# Patient Record
Sex: FEMALE | Race: WHITE | Employment: OTHER | URBAN - METROPOLITAN AREA
[De-identification: names, ages, dates, MRNs, and addresses within clinical notes are randomized per-mention and may not be internally consistent; named-entity substitution may affect disease eponyms.]

---

## 2020-06-24 ENCOUNTER — APPOINTMENT (OUTPATIENT)
Dept: ULTRASOUND IMAGING | Age: 85
DRG: 287 | End: 2020-06-24
Payer: MEDICARE

## 2020-06-24 ENCOUNTER — APPOINTMENT (OUTPATIENT)
Dept: CT IMAGING | Age: 85
DRG: 287 | End: 2020-06-24
Payer: MEDICARE

## 2020-06-24 ENCOUNTER — APPOINTMENT (OUTPATIENT)
Dept: GENERAL RADIOLOGY | Age: 85
DRG: 287 | End: 2020-06-24
Payer: MEDICARE

## 2020-06-24 ENCOUNTER — HOSPITAL ENCOUNTER (INPATIENT)
Age: 85
LOS: 3 days | Discharge: HOME | DRG: 287 | End: 2020-06-27
Attending: EMERGENCY MEDICINE | Admitting: INTERNAL MEDICINE
Payer: MEDICARE

## 2020-06-24 PROBLEM — I48.91 A-FIB (HCC): Status: ACTIVE | Noted: 2020-06-24

## 2020-06-24 LAB
ALBUMIN SERPL-MCNC: 3.6 G/DL (ref 3.5–4.6)
ALP BLD-CCNC: 50 U/L (ref 40–130)
ALT SERPL-CCNC: 41 U/L (ref 0–33)
ANION GAP SERPL CALCULATED.3IONS-SCNC: 13 MEQ/L (ref 9–15)
AST SERPL-CCNC: 23 U/L (ref 0–35)
BASOPHILS ABSOLUTE: 0.1 K/UL (ref 0–0.2)
BASOPHILS RELATIVE PERCENT: 1.2 %
BILIRUB SERPL-MCNC: 0.3 MG/DL (ref 0.2–0.7)
BUN BLDV-MCNC: 21 MG/DL (ref 8–23)
CALCIUM SERPL-MCNC: 8.8 MG/DL (ref 8.5–9.9)
CHLORIDE BLD-SCNC: 105 MEQ/L (ref 95–107)
CO2: 21 MEQ/L (ref 20–31)
CREAT SERPL-MCNC: 1.02 MG/DL (ref 0.5–0.9)
D DIMER: 1.84 MG/L FEU (ref 0–0.5)
EOSINOPHILS ABSOLUTE: 0.2 K/UL (ref 0–0.7)
EOSINOPHILS RELATIVE PERCENT: 2 %
GFR AFRICAN AMERICAN: >60
GFR NON-AFRICAN AMERICAN: 51.5
GLOBULIN: 3.3 G/DL (ref 2.3–3.5)
GLUCOSE BLD-MCNC: 170 MG/DL (ref 70–99)
HCT VFR BLD CALC: 43.8 % (ref 37–47)
HEMOGLOBIN: 14.6 G/DL (ref 12–16)
INR BLD: 1.1
LACTIC ACID: 1.2 MMOL/L (ref 0.5–2.2)
LYMPHOCYTES ABSOLUTE: 2.5 K/UL (ref 1–4.8)
LYMPHOCYTES RELATIVE PERCENT: 24.2 %
MAGNESIUM: 2.1 MG/DL (ref 1.7–2.4)
MCH RBC QN AUTO: 32.3 PG (ref 27–31.3)
MCHC RBC AUTO-ENTMCNC: 33.3 % (ref 33–37)
MCV RBC AUTO: 96.8 FL (ref 82–100)
MONOCYTES ABSOLUTE: 1 K/UL (ref 0.2–0.8)
MONOCYTES RELATIVE PERCENT: 9.6 %
NEUTROPHILS ABSOLUTE: 6.4 K/UL (ref 1.4–6.5)
NEUTROPHILS RELATIVE PERCENT: 63 %
PDW BLD-RTO: 13.8 % (ref 11.5–14.5)
PLATELET # BLD: 296 K/UL (ref 130–400)
POTASSIUM SERPL-SCNC: 4.1 MEQ/L (ref 3.4–4.9)
PRO-BNP: 4654 PG/ML
PROCALCITONIN: 0.03 NG/ML (ref 0–0.15)
PROTHROMBIN TIME: 14 SEC (ref 12.3–14.9)
RBC # BLD: 4.52 M/UL (ref 4.2–5.4)
SARS-COV-2, NAAT: NOT DETECTED
SODIUM BLD-SCNC: 139 MEQ/L (ref 135–144)
TOTAL PROTEIN: 6.9 G/DL (ref 6.3–8)
TROPONIN: 0.03 NG/ML (ref 0–0.01)
TROPONIN: 0.03 NG/ML (ref 0–0.01)
TROPONIN: <0.01 NG/ML (ref 0–0.01)
TSH SERPL DL<=0.05 MIU/L-ACNC: 1.4 UIU/ML (ref 0.44–3.86)
WBC # BLD: 10.2 K/UL (ref 4.8–10.8)

## 2020-06-24 PROCEDURE — 84145 PROCALCITONIN (PCT): CPT

## 2020-06-24 PROCEDURE — 99285 EMERGENCY DEPT VISIT HI MDM: CPT

## 2020-06-24 PROCEDURE — 96376 TX/PRO/DX INJ SAME DRUG ADON: CPT

## 2020-06-24 PROCEDURE — U0002 COVID-19 LAB TEST NON-CDC: HCPCS

## 2020-06-24 PROCEDURE — 84484 ASSAY OF TROPONIN QUANT: CPT

## 2020-06-24 PROCEDURE — 80053 COMPREHEN METABOLIC PANEL: CPT

## 2020-06-24 PROCEDURE — 83880 ASSAY OF NATRIURETIC PEPTIDE: CPT

## 2020-06-24 PROCEDURE — 6370000000 HC RX 637 (ALT 250 FOR IP): Performed by: INTERNAL MEDICINE

## 2020-06-24 PROCEDURE — 96372 THER/PROPH/DIAG INJ SC/IM: CPT

## 2020-06-24 PROCEDURE — 71045 X-RAY EXAM CHEST 1 VIEW: CPT

## 2020-06-24 PROCEDURE — 85379 FIBRIN DEGRADATION QUANT: CPT

## 2020-06-24 PROCEDURE — 6360000002 HC RX W HCPCS: Performed by: INTERNAL MEDICINE

## 2020-06-24 PROCEDURE — 71275 CT ANGIOGRAPHY CHEST: CPT

## 2020-06-24 PROCEDURE — 2060000000 HC ICU INTERMEDIATE R&B

## 2020-06-24 PROCEDURE — 87040 BLOOD CULTURE FOR BACTERIA: CPT

## 2020-06-24 PROCEDURE — 84443 ASSAY THYROID STIM HORMONE: CPT

## 2020-06-24 PROCEDURE — 6360000002 HC RX W HCPCS: Performed by: EMERGENCY MEDICINE

## 2020-06-24 PROCEDURE — 2580000003 HC RX 258: Performed by: EMERGENCY MEDICINE

## 2020-06-24 PROCEDURE — 93005 ELECTROCARDIOGRAM TRACING: CPT | Performed by: EMERGENCY MEDICINE

## 2020-06-24 PROCEDURE — 6360000004 HC RX CONTRAST MEDICATION: Performed by: EMERGENCY MEDICINE

## 2020-06-24 PROCEDURE — 85610 PROTHROMBIN TIME: CPT

## 2020-06-24 PROCEDURE — 93970 EXTREMITY STUDY: CPT

## 2020-06-24 PROCEDURE — 96365 THER/PROPH/DIAG IV INF INIT: CPT

## 2020-06-24 PROCEDURE — 85025 COMPLETE CBC W/AUTO DIFF WBC: CPT

## 2020-06-24 PROCEDURE — 2580000003 HC RX 258: Performed by: NURSE PRACTITIONER

## 2020-06-24 PROCEDURE — 83605 ASSAY OF LACTIC ACID: CPT

## 2020-06-24 PROCEDURE — 6370000000 HC RX 637 (ALT 250 FOR IP): Performed by: NURSE PRACTITIONER

## 2020-06-24 PROCEDURE — 2500000003 HC RX 250 WO HCPCS: Performed by: EMERGENCY MEDICINE

## 2020-06-24 PROCEDURE — 36415 COLL VENOUS BLD VENIPUNCTURE: CPT

## 2020-06-24 PROCEDURE — 83735 ASSAY OF MAGNESIUM: CPT

## 2020-06-24 PROCEDURE — 99223 1ST HOSP IP/OBS HIGH 75: CPT | Performed by: INTERNAL MEDICINE

## 2020-06-24 RX ORDER — ACETAMINOPHEN 650 MG/1
650 SUPPOSITORY RECTAL EVERY 6 HOURS PRN
Status: DISCONTINUED | OUTPATIENT
Start: 2020-06-24 | End: 2020-06-27 | Stop reason: HOSPADM

## 2020-06-24 RX ORDER — ACETAMINOPHEN 325 MG/1
650 TABLET ORAL EVERY 6 HOURS PRN
Status: DISCONTINUED | OUTPATIENT
Start: 2020-06-24 | End: 2020-06-27 | Stop reason: HOSPADM

## 2020-06-24 RX ORDER — ASPIRIN 81 MG/1
81 TABLET, CHEWABLE ORAL DAILY
Status: DISCONTINUED | OUTPATIENT
Start: 2020-06-24 | End: 2020-06-27 | Stop reason: HOSPADM

## 2020-06-24 RX ORDER — GUAIFENESIN 600 MG/1
600 TABLET, EXTENDED RELEASE ORAL 2 TIMES DAILY
Status: DISCONTINUED | OUTPATIENT
Start: 2020-06-24 | End: 2020-06-27 | Stop reason: HOSPADM

## 2020-06-24 RX ORDER — FUROSEMIDE 10 MG/ML
20 INJECTION INTRAMUSCULAR; INTRAVENOUS 2 TIMES DAILY
Status: DISCONTINUED | OUTPATIENT
Start: 2020-06-24 | End: 2020-06-27

## 2020-06-24 RX ORDER — SODIUM CHLORIDE 0.9 % (FLUSH) 0.9 %
10 SYRINGE (ML) INJECTION EVERY 12 HOURS SCHEDULED
Status: DISCONTINUED | OUTPATIENT
Start: 2020-06-24 | End: 2020-06-27 | Stop reason: HOSPADM

## 2020-06-24 RX ORDER — POLYETHYLENE GLYCOL 3350 17 G/17G
17 POWDER, FOR SOLUTION ORAL DAILY PRN
Status: DISCONTINUED | OUTPATIENT
Start: 2020-06-24 | End: 2020-06-27 | Stop reason: HOSPADM

## 2020-06-24 RX ORDER — DILTIAZEM HYDROCHLORIDE 5 MG/ML
20 INJECTION INTRAVENOUS ONCE
Status: COMPLETED | OUTPATIENT
Start: 2020-06-24 | End: 2020-06-24

## 2020-06-24 RX ORDER — ONDANSETRON 2 MG/ML
4 INJECTION INTRAMUSCULAR; INTRAVENOUS EVERY 6 HOURS PRN
Status: DISCONTINUED | OUTPATIENT
Start: 2020-06-24 | End: 2020-06-27 | Stop reason: HOSPADM

## 2020-06-24 RX ORDER — PROMETHAZINE HYDROCHLORIDE 12.5 MG/1
12.5 TABLET ORAL EVERY 6 HOURS PRN
Status: DISCONTINUED | OUTPATIENT
Start: 2020-06-24 | End: 2020-06-27 | Stop reason: HOSPADM

## 2020-06-24 RX ORDER — IPRATROPIUM BROMIDE AND ALBUTEROL SULFATE 2.5; .5 MG/3ML; MG/3ML
1 SOLUTION RESPIRATORY (INHALATION) EVERY 4 HOURS PRN
Status: DISCONTINUED | OUTPATIENT
Start: 2020-06-24 | End: 2020-06-27 | Stop reason: HOSPADM

## 2020-06-24 RX ORDER — SODIUM CHLORIDE 0.9 % (FLUSH) 0.9 %
10 SYRINGE (ML) INJECTION PRN
Status: DISCONTINUED | OUTPATIENT
Start: 2020-06-24 | End: 2020-06-27 | Stop reason: HOSPADM

## 2020-06-24 RX ORDER — ATORVASTATIN CALCIUM 20 MG/1
20 TABLET, FILM COATED ORAL NIGHTLY
Status: DISCONTINUED | OUTPATIENT
Start: 2020-06-24 | End: 2020-06-27 | Stop reason: HOSPADM

## 2020-06-24 RX ADMIN — IOPAMIDOL 100 ML: 612 INJECTION, SOLUTION INTRAVENOUS at 11:07

## 2020-06-24 RX ADMIN — Medication 10 ML: at 22:29

## 2020-06-24 RX ADMIN — FUROSEMIDE 20 MG: 10 INJECTION, SOLUTION INTRAMUSCULAR; INTRAVENOUS at 19:01

## 2020-06-24 RX ADMIN — GUAIFENESIN 600 MG: 600 TABLET, EXTENDED RELEASE ORAL at 22:29

## 2020-06-24 RX ADMIN — ASPIRIN 81 MG: 81 TABLET, CHEWABLE ORAL at 19:01

## 2020-06-24 RX ADMIN — ENOXAPARIN SODIUM 80 MG: 80 INJECTION SUBCUTANEOUS at 11:55

## 2020-06-24 RX ADMIN — DILTIAZEM HYDROCHLORIDE 7.5 MG/HR: 5 INJECTION INTRAVENOUS at 11:24

## 2020-06-24 RX ADMIN — DILTIAZEM HYDROCHLORIDE 20 MG: 5 INJECTION INTRAVENOUS at 10:23

## 2020-06-24 RX ADMIN — AZITHROMYCIN MONOHYDRATE 500 MG: 500 INJECTION, POWDER, LYOPHILIZED, FOR SOLUTION INTRAVENOUS at 12:33

## 2020-06-24 RX ADMIN — ATORVASTATIN CALCIUM 20 MG: 20 TABLET, FILM COATED ORAL at 22:29

## 2020-06-24 RX ADMIN — CEFTRIAXONE SODIUM 1 G: 1 INJECTION, POWDER, FOR SOLUTION INTRAMUSCULAR; INTRAVENOUS at 12:18

## 2020-06-24 RX ADMIN — METOPROLOL TARTRATE 25 MG: 25 TABLET, FILM COATED ORAL at 17:14

## 2020-06-24 ASSESSMENT — ENCOUNTER SYMPTOMS
COUGH: 1
SHORTNESS OF BREATH: 1
VOMITING: 0
EYE PAIN: 0
SORE THROAT: 0
ABDOMINAL PAIN: 0
ALLERGIC/IMMUNOLOGIC NEGATIVE: 1
WHEEZING: 0
CHEST TIGHTNESS: 0
GASTROINTESTINAL NEGATIVE: 1
EYES NEGATIVE: 1
NAUSEA: 0

## 2020-06-24 NOTE — CARE COORDINATION
[facility] EMERGENCY [facility] [place] Case Management Initial Discharge Assessment    Met with Patient to discuss discharge plan. PCP: Dr. Maria Dolores Medina                          Date of Last Visit: fall 2019    If no PCP, list provided? N/A    Discharge Planning    Living Arrangements: independently at home    Who do you live with? spouse    Who helps you with your care:  self    If lives at home:     Do you have any barriers navigating in your home? no    Patient can perform ADL? Yes    Current Services (outpatient and in home) :  None    Dialysis: No    Is transportation available to get to your appointments? Yes    DME Equipment:  no    Respiratory equipment: None    Respiratory provider:  no     Pharmacy:  yes - [address]    Consult with Medication Assistance Program?  No      Patient agreeable to [facility]? N/A    Patient agreeable to SNF/Rehab? N/A    Other discharge needs identified? N/A    Freedom of choice list provided with basic dialogue that supports the patient's individualized plan of care/goals and shares the quality data associated with the providers. N/A    Does Patient Have a High-Risk for Readmission Diagnosis (CHF, PN, MI, COPD)? No      The plan for Transition of Care is related to the following treatment goals:decrease shortness of breath    Initial Discharge Plan? (Note: please see concurrent daily documentation for any updates after initial note).         The Patient and/or patient representative:  was provided with choice of any post-acute providers for care and equipment and agrees with discharge plan  N/A    Electronically signed by Antoinette Patel RN on 6/24/2020 at 1:05 PM

## 2020-06-24 NOTE — H&P
Hospitalist History and Physical  Name: Vanna Loja  Age: 80 y.o. Gender: female  CodeStatus: No Order  Allergies: No Known Allergies    Chief Complaint:Shortness of Breath (x 2 weeks, getting worse. Flew home May 28th from [place]. Got SOB after)    Primary Care Provider: Abdullahi Ahuja MD  InpatientTreatment Team: Treatment Team: Attending Provider: Kristina Blanchard DO; Patient Care Tech: Jean Ding; Registered Nurse: Kam Scott RN; Consulting Physician: William Thurston DO  Admission Date: 6/24/2020      Subjective: Patient is 80-year-old female with past medical history of hypertension who presented to the emergency room with reports of shortness of breath. Patient reports living in [address] [place] [address] since January. She denies being exposed to COVID during that time but also states that she has had some fatigue, intermittent cough and increased shortness of breath since February. Her shortness of breath has worsened along with right lower extremity tightness since flying back to [place] on May 28. She denies palpitations, fever, dizziness, nausea or other flulike symptoms. Emergency room work-up revealed a elevated d-dimer. CTA negative for PE but does show left lower lobe pneumonia. Patient is hemodynamically stable and afebrile. Heart rate was 158 and irregular on arrival, currently 75 and regular. Physical Exam  Constitutional:       Appearance: Normal appearance. HENT:      Head: Normocephalic and atraumatic. Right Ear: External ear normal.      Left Ear: External ear normal.      Nose: Nose normal.      Mouth/Throat:      Mouth: Mucous membranes are moist.      Pharynx: Oropharynx is clear. Eyes:      Extraocular Movements: Extraocular movements intact. Conjunctiva/sclera: Conjunctivae normal.      Pupils: Pupils are equal, round, and reactive to light. Neck:      Musculoskeletal: Normal range of motion and neck supple.    Cardiovascular:      Rate and Rhythm: Normal rate and regular rhythm. Pulses: Normal pulses. Heart sounds: Normal heart sounds. Pulmonary:      Effort: Pulmonary effort is normal.      Breath sounds: Normal breath sounds. Abdominal:      General: Bowel sounds are normal.      Palpations: Abdomen is soft. Musculoskeletal: Normal range of motion. Skin:     General: Skin is warm and dry. Capillary Refill: Capillary refill takes less than 2 seconds. Neurological:      General: No focal deficit present. Mental Status: She is alert and oriented to person, place, and time. Psychiatric:         Mood and Affect: Mood normal.         Review of Systems   Constitutional: Positive for fatigue. HENT: Negative. Eyes: Negative. Respiratory: Positive for cough and shortness of breath. Cardiovascular: Negative. Gastrointestinal: Negative. Endocrine: Negative. Genitourinary: Negative. Musculoskeletal: Negative. Skin: Negative. Allergic/Immunologic: Negative. Neurological: Negative. Hematological: Negative. Psychiatric/Behavioral: Negative. Medications:  Reviewed    No current facility-administered medications on file prior to encounter. Current Outpatient Medications on File Prior to Encounter   Medication Sig Dispense Refill    amLODIPine (NORVASC) 5 MG tablet TAKE 1 TABLET BY MOUTH ONCE DAILY 90 tablet 3    acetaminophen (TYLENOL) 325 MG tablet Take 650 mg by mouth every 6 hours as needed.  oxybutynin (DITROPAN) 5 MG tablet Take 1 tablet by mouth 3 times daily. 270 tablet 1    diphenhydrAMINE (BENADRYL) 25 MG tablet Take 25 mg by mouth nightly as needed. For sleep       aspirin 81 MG EC tablet Take 81 mg by mouth daily.  hydrochlorothiazide (HYDRODIURIL) 25 MG tablet Take 25 mg by mouth daily.  multivitamin (ANTIOXIDANT;PROSIGHT) TABS per tablet Take 1 tablet by mouth daily.            Past Medical History:   Diagnosis Date    Elevated glucose     HTN (hypertension)     Hyperlipidemia     Osteopenia     Hips    Rheumatic heart disease     as a child    Rib fractures     Secondary to AA       Past Surgical History:   Procedure Laterality Date    APPENDECTOMY       SECTION      COLONOSCOPY      Internal hemorrhoids    COLONOSCOPY  2011    normal    HYSTERECTOMY          Family History   Problem Relation Age of Onset    Heart Disease Mother         Valvular heart disease from childhood rheumatic fever    Diabetes Maternal Aunt         Social History     Socioeconomic History    Marital status:      Spouse name: Not on file    Number of children: Not on file    Years of education: Not on file    Highest education level: Not on file   Occupational History    Not on file   Social Needs    Financial resource strain: Not on file    Food insecurity     Worry: Not on file     Inability: Not on file    Transportation needs     Medical: Not on file     Non-medical: Not on file   Tobacco Use    Smoking status: Never Smoker    Smokeless tobacco: Never Used   Substance and Sexual Activity    Alcohol use: No    Drug use: Never    Sexual activity: Not on file   Lifestyle    Physical activity     Days per week: Not on file     Minutes per session: Not on file    Stress: Not on file   Relationships    Social connections     Talks on phone: Not on file     Gets together: Not on file     Attends Jehovah's witness service: Not on file     Active member of club or organization: Not on file     Attends meetings of clubs or organizations: Not on file     Relationship status: Not on file    Intimate partner violence     Fear of current or ex partner: Not on file     Emotionally abused: Not on file     Physically abused: Not on file     Forced sexual activity: Not on file   Other Topics Concern    Not on file   Social History Narrative    Not on file        Infusion Medications:    dilTIAZem (CARDIZEM) 125 mg in dextrose 5% 125 mL infusion 10 mg/hr (20 1155)

## 2020-06-24 NOTE — CONSULTS
mEq/L    Chloride 105 95 - 107 mEq/L    CO2 21 20 - 31 mEq/L    Anion Gap 13 9 - 15 mEq/L    Glucose 170 (H) 70 - 99 mg/dL    BUN 21 8 - 23 mg/dL    CREATININE 1.02 (H) 0.50 - 0.90 mg/dL    GFR Non-African American 51.5 (L) >60    GFR  >60.0 >60    Calcium 8.8 8.5 - 9.9 mg/dL    Total Protein 6.9 6.3 - 8.0 g/dL    Alb 3.6 3.5 - 4.6 g/dL    Total Bilirubin 0.3 0.2 - 0.7 mg/dL    Alkaline Phosphatase 50 40 - 130 U/L    ALT 41 (H) 0 - 33 U/L    AST 23 0 - 35 U/L    Globulin 3.3 2.3 - 3.5 g/dL   Troponin    Collection Time: 06/24/20 10:00 AM   Result Value Ref Range    Troponin <0.010 0.000 - 0.010 ng/mL   D-Dimer, Quantitative    Collection Time: 06/24/20 10:00 AM   Result Value Ref Range    D-Dimer, Quant 1.84 (HH) 0.00 - 0.50 mg/L FEU   Protime-INR    Collection Time: 06/24/20 10:00 AM   Result Value Ref Range    Protime 14.0 12.3 - 14.9 sec    INR 1.1    Magnesium    Collection Time: 06/24/20 10:00 AM   Result Value Ref Range    Magnesium 2.1 1.7 - 2.4 mg/dL   COVID-19    Collection Time: 06/24/20 10:17 AM   Result Value Ref Range    SARS-CoV-2, NAAT Not Detected Not Detected   Lactic Acid, Plasma    Collection Time: 06/24/20 12:15 PM   Result Value Ref Range    Lactic Acid 1.2 0.5 - 2.2 mmol/L   Troponin    Collection Time: 06/24/20  3:25 PM   Result Value Ref Range    Troponin 0.030 (HH) 0.000 - 0.010 ng/mL   PROCALCITONIN    Collection Time: 06/24/20  3:25 PM   Result Value Ref Range    Procalcitonin 0.03 0.00 - 0.15 ng/mL   TSH without Reflex    Collection Time: 06/24/20  3:25 PM   Result Value Ref Range    TSH 1.400 0.440 - 3.860 uIU/mL     Troponin:   Lab Results   Component Value Date    TROPONINI 0.030 06/24/2020           ASSESSMENT:    New onset paroxysmal atrial fibrillation, currently normal sinus rhythm    Shortness of breath secondary to pneumonia.   Rule out cardiomyopathy rule out cardiac ischemia    Elevated cardiac enzymes questionable secondary to demand ischemia versus underlying

## 2020-06-24 NOTE — ED PROVIDER NOTES
(hypertension)     Hyperlipidemia     Osteopenia     Hips    Rheumatic heart disease     as a child    Rib fractures     Secondary to AA         SURGICALHISTORY       Past Surgical History:   Procedure Laterality Date    APPENDECTOMY       SECTION      COLONOSCOPY      Internal hemorrhoids    COLONOSCOPY      normal    HYSTERECTOMY           CURRENT MEDICATIONS       Previous Medications    ACETAMINOPHEN (TYLENOL) 325 MG TABLET    Take 650 mg by mouth every 6 hours as needed. AMLODIPINE (NORVASC) 5 MG TABLET    TAKE 1 TABLET BY MOUTH ONCE DAILY    ASPIRIN 81 MG EC TABLET    Take 81 mg by mouth daily. DIPHENHYDRAMINE (BENADRYL) 25 MG TABLET    Take 25 mg by mouth nightly as needed. For sleep     HYDROCHLOROTHIAZIDE (HYDRODIURIL) 25 MG TABLET    Take 25 mg by mouth daily. MULTIVITAMIN (ANTIOXIDANT;PROSIGHT) TABS PER TABLET    Take 1 tablet by mouth daily. OXYBUTYNIN (DITROPAN) 5 MG TABLET    Take 1 tablet by mouth 3 times daily. ALLERGIES     Patient has no known allergies.     FAMILY HISTORY       Family History   Problem Relation Age of Onset    Heart Disease Mother         Valvular heart disease from childhood rheumatic fever    Diabetes Maternal Aunt           SOCIAL HISTORY       Social History     Socioeconomic History    Marital status:      Spouse name: None    Number of children: None    Years of education: None    Highest education level: None   Occupational History    None   Social Needs    Financial resource strain: None    Food insecurity     Worry: None     Inability: None    Transportation needs     Medical: None     Non-medical: None   Tobacco Use    Smoking status: Never Smoker    Smokeless tobacco: Never Used   Substance and Sexual Activity    Alcohol use: No    Drug use: Never    Sexual activity: None   Lifestyle    Physical activity     Days per week: None     Minutes per session: None    Stress: None   Relationships    Social connections     Talks on phone: None     Gets together: None     Attends Yazidi service: None     Active member of club or organization: None     Attends meetings of clubs or organizations: None     Relationship status: None    Intimate partner violence     Fear of current or ex partner: None     Emotionally abused: None     Physically abused: None     Forced sexual activity: None   Other Topics Concern    None   Social History Narrative    None       SCREENINGS    [place] Coma Scale  Eye Opening: Spontaneous  Best Verbal Response: Oriented         PHYSICAL EXAM    (up to 7 for level 4, 8 or more for level 5)     ED Triage Vitals [06/24/20 0945]   BP Temp Temp Source Pulse Resp SpO2 Height Weight   (!) 159/102 97.8 °F (36.6 °C) Oral 158 26 94 % 5' 1\" (1.549 m) 180 lb (81.6 kg)       Physical Exam  Constitutional:       General: She is not in acute distress. Appearance: She is well-developed. She is not diaphoretic. HENT:      Head: Normocephalic and atraumatic. Right Ear: External ear normal.      Left Ear: External ear normal.      Mouth/Throat:      Pharynx: No oropharyngeal exudate. Eyes:      Conjunctiva/sclera: Conjunctivae normal.      Pupils: Pupils are equal, round, and reactive to light. Neck:      Musculoskeletal: Normal range of motion and neck supple. Thyroid: No thyromegaly. Vascular: No JVD. Trachea: No tracheal deviation. Cardiovascular:      Rate and Rhythm: Tachycardia present. Rhythm irregular. Heart sounds: Normal heart sounds. No murmur. Pulmonary:      Effort: Pulmonary effort is normal. No respiratory distress. Breath sounds: Normal breath sounds. No wheezing, rhonchi or rales. Abdominal:      General: Bowel sounds are normal.      Palpations: Abdomen is soft. Tenderness: There is no abdominal tenderness. There is no guarding. Musculoskeletal: Normal range of motion. Skin:     General: Skin is warm and dry.       Findings: No

## 2020-06-24 NOTE — ED TRIAGE NOTES
Patient with no history of any cardiac issues patient came back from [place] a few weeks ago and has felt short of breath

## 2020-06-24 NOTE — ACP (ADVANCE CARE PLANNING)
Advance Care Planning     Advance Care Planning Activator (Inpatient)  Conversation Note      Date of ACP Conversation: 6/24/2020    Conversation Conducted with: Patient with Decision Making Capacity    ACP Activator: Linus Chairez makes decisions on behalf of the incapacitated patient: Decision Maker is asked to consider and make decisions based on patient values, known preferences, or best interests. Health Care Decision Maker:     Current Designated Health Care Decision Maker:   (If there is a valid [place] named in the [facility] Makers\" box in the ACP activity, but it is not visible above, be sure to open that field and then select the health care decision maker relationship (ie \"primary\") in the blank space to the right of the name.) Validate  this information as still accurate & up-to-date; edit [place] field as needed.)    Note: Assess and validate information in current ACP documents, as indicated. If no Decision Maker listed above or available through scanned documents, then:    If no Authorized Decision Maker has previously been identified, then patient chooses [place]:  \"Who would you like to name as your primary health care decision-maker? \"               Name: Dawit Kline        Relationship: dtr          Phone number: [phone]  \"Can this person be reached easily? \" Yes  \"Who would you like to name as your back-up decision maker? \"   Name: Rola Bautista         Relationship: spouse          Phone number: [phone]  Lucinda Mondragon this person be reached easily? \" Yes    Note: If the relationship of these Decision-Makers to the patient does NOT follow your state's Next of Kin hierarchy, recommend that patient complete ACP document that meets state-specific requirements to allow them to act on the patient's behalf when appropriate. Care Preferences    Ventilation:   \"If you were in your present state of health and POLST/POST/MOLST/MOST prepared for Provider review and signature      Follow-up plan:    [x] Schedule follow-up conversation to continue planning  [] Referred individual to Provider for additional questions/concerns   [] Advised patient/agent/surrogate to review completed ACP document and update if needed with changes in condition, patient preferences or care setting    [] This note routed to one or more involved healthcare providers

## 2020-06-25 LAB
ANION GAP SERPL CALCULATED.3IONS-SCNC: 12 MEQ/L (ref 9–15)
BUN BLDV-MCNC: 21 MG/DL (ref 8–23)
CALCIUM SERPL-MCNC: 9.3 MG/DL (ref 8.5–9.9)
CHLORIDE BLD-SCNC: 106 MEQ/L (ref 95–107)
CO2: 23 MEQ/L (ref 20–31)
CREAT SERPL-MCNC: 1.12 MG/DL (ref 0.5–0.9)
EKG ATRIAL RATE: 113 BPM
EKG ATRIAL RATE: 141 BPM
EKG ATRIAL RATE: 78 BPM
EKG Q-T INTERVAL: 312 MS
EKG Q-T INTERVAL: 372 MS
EKG Q-T INTERVAL: 378 MS
EKG QRS DURATION: 122 MS
EKG QRS DURATION: 122 MS
EKG QRS DURATION: 126 MS
EKG QTC CALCULATION (BAZETT): 427 MS
EKG QTC CALCULATION (BAZETT): 477 MS
EKG QTC CALCULATION (BAZETT): 499 MS
EKG R AXIS: -34 DEGREES
EKG R AXIS: -38 DEGREES
EKG R AXIS: -50 DEGREES
EKG T AXIS: 125 DEGREES
EKG T AXIS: 129 DEGREES
EKG T AXIS: 134 DEGREES
EKG VENTRICULAR RATE: 154 BPM
EKG VENTRICULAR RATE: 77 BPM
EKG VENTRICULAR RATE: 99 BPM
GFR AFRICAN AMERICAN: 56
GFR NON-AFRICAN AMERICAN: 46.3
GLUCOSE BLD-MCNC: 138 MG/DL (ref 70–99)
INR BLD: 1.2
LV EF: 38 %
LVEF MODALITY: NORMAL
POTASSIUM SERPL-SCNC: 4.1 MEQ/L (ref 3.4–4.9)
PROTHROMBIN TIME: 15.3 SEC (ref 12.3–14.9)
SARS-COV-2, NAAT: NOT DETECTED
SODIUM BLD-SCNC: 141 MEQ/L (ref 135–144)

## 2020-06-25 PROCEDURE — 93005 ELECTROCARDIOGRAM TRACING: CPT | Performed by: NURSE PRACTITIONER

## 2020-06-25 PROCEDURE — 2580000003 HC RX 258: Performed by: NURSE PRACTITIONER

## 2020-06-25 PROCEDURE — 6370000000 HC RX 637 (ALT 250 FOR IP): Performed by: INTERNAL MEDICINE

## 2020-06-25 PROCEDURE — 85610 PROTHROMBIN TIME: CPT

## 2020-06-25 PROCEDURE — 2700000000 HC OXYGEN THERAPY PER DAY

## 2020-06-25 PROCEDURE — 2500000003 HC RX 250 WO HCPCS: Performed by: INTERNAL MEDICINE

## 2020-06-25 PROCEDURE — 6360000002 HC RX W HCPCS: Performed by: INTERNAL MEDICINE

## 2020-06-25 PROCEDURE — 99233 SBSQ HOSP IP/OBS HIGH 50: CPT | Performed by: INTERNAL MEDICINE

## 2020-06-25 PROCEDURE — U0002 COVID-19 LAB TEST NON-CDC: HCPCS

## 2020-06-25 PROCEDURE — 36415 COLL VENOUS BLD VENIPUNCTURE: CPT

## 2020-06-25 PROCEDURE — 6370000000 HC RX 637 (ALT 250 FOR IP): Performed by: NURSE PRACTITIONER

## 2020-06-25 PROCEDURE — 6360000002 HC RX W HCPCS: Performed by: NURSE PRACTITIONER

## 2020-06-25 PROCEDURE — 80048 BASIC METABOLIC PNL TOTAL CA: CPT

## 2020-06-25 PROCEDURE — 93306 TTE W/DOPPLER COMPLETE: CPT

## 2020-06-25 PROCEDURE — 2060000000 HC ICU INTERMEDIATE R&B

## 2020-06-25 PROCEDURE — 6370000000 HC RX 637 (ALT 250 FOR IP): Performed by: PHYSICIAN ASSISTANT

## 2020-06-25 PROCEDURE — APPNB30 APP NON BILLABLE TIME 0-30 MINS: Performed by: PHYSICIAN ASSISTANT

## 2020-06-25 PROCEDURE — 99223 1ST HOSP IP/OBS HIGH 75: CPT | Performed by: INTERNAL MEDICINE

## 2020-06-25 RX ORDER — METOPROLOL TARTRATE 5 MG/5ML
2.5 INJECTION INTRAVENOUS EVERY 6 HOURS PRN
Status: DISCONTINUED | OUTPATIENT
Start: 2020-06-25 | End: 2020-06-26

## 2020-06-25 RX ORDER — METOPROLOL TARTRATE 5 MG/5ML
5 INJECTION INTRAVENOUS ONCE
Status: COMPLETED | OUTPATIENT
Start: 2020-06-25 | End: 2020-06-25

## 2020-06-25 RX ADMIN — ASPIRIN 81 MG: 81 TABLET, CHEWABLE ORAL at 08:33

## 2020-06-25 RX ADMIN — CEFTRIAXONE SODIUM 1 G: 1 INJECTION, POWDER, FOR SOLUTION INTRAMUSCULAR; INTRAVENOUS at 11:59

## 2020-06-25 RX ADMIN — Medication 10 ML: at 08:34

## 2020-06-25 RX ADMIN — GUAIFENESIN 600 MG: 600 TABLET, EXTENDED RELEASE ORAL at 08:33

## 2020-06-25 RX ADMIN — ATORVASTATIN CALCIUM 20 MG: 20 TABLET, FILM COATED ORAL at 21:27

## 2020-06-25 RX ADMIN — Medication 10 ML: at 21:27

## 2020-06-25 RX ADMIN — ENOXAPARIN SODIUM 80 MG: 80 INJECTION SUBCUTANEOUS at 21:31

## 2020-06-25 RX ADMIN — FUROSEMIDE 20 MG: 10 INJECTION, SOLUTION INTRAMUSCULAR; INTRAVENOUS at 08:33

## 2020-06-25 RX ADMIN — GUAIFENESIN 600 MG: 600 TABLET, EXTENDED RELEASE ORAL at 21:26

## 2020-06-25 RX ADMIN — METOPROLOL TARTRATE 25 MG: 25 TABLET, FILM COATED ORAL at 08:34

## 2020-06-25 RX ADMIN — FUROSEMIDE 20 MG: 10 INJECTION, SOLUTION INTRAMUSCULAR; INTRAVENOUS at 17:59

## 2020-06-25 RX ADMIN — METOPROLOL TARTRATE 25 MG: 25 TABLET, FILM COATED ORAL at 21:27

## 2020-06-25 RX ADMIN — AZITHROMYCIN 500 MG: 500 INJECTION, POWDER, LYOPHILIZED, FOR SOLUTION INTRAVENOUS at 12:54

## 2020-06-25 RX ADMIN — ENOXAPARIN SODIUM 80 MG: 80 INJECTION SUBCUTANEOUS at 00:50

## 2020-06-25 RX ADMIN — METOPROLOL TARTRATE 5 MG: 5 INJECTION INTRAVENOUS at 00:51

## 2020-06-25 RX ADMIN — ENOXAPARIN SODIUM 80 MG: 80 INJECTION SUBCUTANEOUS at 08:34

## 2020-06-25 RX ADMIN — Medication 5 MG: at 21:26

## 2020-06-25 ASSESSMENT — PAIN SCALES - GENERAL
PAINLEVEL_OUTOF10: 0

## 2020-06-25 ASSESSMENT — ENCOUNTER SYMPTOMS
VOMITING: 0
NAUSEA: 0
COLOR CHANGE: 0
SHORTNESS OF BREATH: 1
CHEST TIGHTNESS: 0

## 2020-06-25 NOTE — FLOWSHEET NOTE
2229 Pt assessment and medications completed. Pt educated on need for negative airflow in the room. 0010 Pt came out to nursing station, without a mask on, demanding that we turn off the negative airflow in her room. Pt was informed that she needs to wear a mask any time she leaves her room and was again educated on the need for negative air flow. Pt stated that she will call her  and leave AMA. 0015 Notified by MR that pt converted to AF RVR with HR in 150s. 0016 Radha AM at pt bedside to discuss pt's concerns. Pt becoming increasingly verbally aggressive. Dr. Goran Muñoz notified of pt's change in condition. 0030 Dr. Goran Muñoz at pt bedside. She is still agitated. Received orders to retest COVID, administer 5mg IV lopressor, and 80mg SQ lovenox. 0050 Administered lopressor and lovenox per orders. Pt is more at ease at this time, but she states she still wants to leave AMA. Electronically signed by Geo Zuniga RN on 6/25/2020 at 1:10 AM    0106 Notified by MR that pt's HR is now in 90s and she converted to a-flutter. Dr. Goran Muñoz notified via perfect serve. Electronically signed by Geo Zuniga RN on 6/25/2020 at 1:27 AM    0215 Pt converts back and forth between a-fib and a-flutter with controlled rate. Second COVID test came back negative. Electronically signed by Geo Zuniga RN on 6/25/2020 at 2:24 AM    8157 Notified by  that pt is refusing am blood work. Pt stated she is going home when she wakes up. Electronically signed by Geo Zuniga RN on 6/25/2020 at 4:59 AM    0510 This RN spoke with pt regarding refusal of labs. She is agreeable to having her labs drawn. Electronically signed by Geo Zuniga RN on 6/25/2020 at 5:18 AM    1781 Spoke with pt's daughter, Bernardo. Per pt, okay to give her daughter HIPAA code and updates. Updated pt's daughter on pt condition. Her daughter expressed concerns of pt showing signs of what she believes to be dementia.  Her daughter

## 2020-06-25 NOTE — PROGRESS NOTES
Spiritual Care Services     Summary of Visit:  Very pleasant woman who recently returned from winter in [place]. Patient shared a bit of her life journey and experiences. Patient expressed that she began to experience shortness of breath in [place], but has never had any other issues till now. Her daughter brought her a beautiful bouquet of flowers that helped cheer the patient up. Patient is calm and hopeful for discharge soon to get home.  provided a Spiritual Communion card for the patient and a blessing. Spiritual Assessment/Intervention/Outcomes:    Encounter Summary  Services provided to[de-identified] Patient  Referral/Consult From[de-identified] Rounding(ACP 80+ report)  Support System: Spouse, Children, Family members, Anabaptism/lisa community  Place of Taoism: [facility]  Contact Anabaptism: No  Continue Visiting: No  Complexity of Encounter: Moderate  Length of Encounter: 30 minutes  Spiritual Assessment Completed:  Yes  Advance Care Planning: Yes  Routine  Type: Initial  Assessment: Calm, Approachable, Hopeful, Coping  Intervention: Active listening, Explored feelings, thoughts, concerns, Nurtured hope, [place], Discussed relationship with God, Discussed belief system/Jehovah's witness practices/lisa, Discussed illness/injury and it's impact  Outcome: Comfort, Expressed gratitude, Engaged in conversation, Shared reminiscences, Encouraged, Hopeful, Receptive              Advance Directives (For Healthcare)  Pre-existing DNR Comfort Care/DNR Arrest/DNI Order: No  Healthcare Directive: Yes, patient has an advance directive for healthcare treatment  Type of Healthcare Directive: Durable power of  for health care  Copy in Chart: Other (Comment)(No documents in [employer] or [employer])  Healthcare Agent Appointed: Adult Children(Per ACP note)  Healthcare Agent's Name: [facility] [employer] Agent's Phone Number: [phone]  If you are unable to speak for yourself, does your Healthcare Agent or Legal

## 2020-06-25 NOTE — PROGRESS NOTES
of pulmonary believes him, mild cardiomegaly, coronary calcifications as well is reflux of contrast into the hepatic vein suggesting a degree of heart failure. There is small to moderate bilateral pleural effusions. Also noted is a left lower lobe infiltrate/pneumonia. Her second troponin is mildly elevated at 0.03. Blood pressure significant elevated on presentation at 159/102. Review of Systems:   Review of Systems   Constitutional: Negative for activity change, fatigue and fever. HENT: Negative for congestion. Respiratory: Positive for shortness of breath (improving). Negative for chest tightness. Cardiovascular: Negative for chest pain, palpitations and leg swelling. Gastrointestinal: Negative for nausea and vomiting. Genitourinary: Negative for difficulty urinating. Musculoskeletal: Negative for arthralgias. Skin: Negative for color change and pallor. Neurological: Negative for dizziness, syncope and light-headedness. Psychiatric/Behavioral: Negative for agitation and behavioral problems. Physical Examination:    /68   Pulse 102   Temp 97.7 °F (36.5 °C) (Oral)   Resp 16   Ht 5' 1\" (1.549 m)   Wt 180 lb (81.6 kg)   SpO2 93%   BMI 34.01 kg/m²    Physical Exam  Constitutional:       General: She is not in acute distress. Appearance: Normal appearance. She is obese. HENT:      Head: Normocephalic and atraumatic. Neck:      Musculoskeletal: Normal range of motion and neck supple. Cardiovascular:      Rate and Rhythm: Tachycardia present. Rhythm irregularly irregular. Heart sounds: No murmur. Pulmonary:      Effort: Pulmonary effort is normal. No respiratory distress. Breath sounds: No wheezing, rhonchi or rales. Abdominal:      Palpations: Abdomen is soft. Tenderness: There is no abdominal tenderness. Musculoskeletal: Normal range of motion. Right lower leg: Edema (trace) present. Left lower leg: Edema (trace) present.    Skin: aspirin 81 mg p.o. daily, increase Lopressor to 25 mg p.o. twice daily from once daily, Lipitor 20 mg tablet p.o. daily at at bedtime, IV diuresis as tolerated currently with Lasix 20 mg IV twice daily, full dose anticoagulation with Lovenox 1 mg/kg subcu twice daily for now, consider addition of ACE inhibitor/angiotensin receptor blocker in future if renal function remains stable and blood pressure tolerates  2. Will transition to oral anticoagulation at discharge, likely Eliquis. [id number] score = 5.  3. Cardiac/less than 2 g sodium diet recommended  4. 1800 mL daily fluid restriction  5. Check daily weight and strict intake and output  6. Check echocardiogram  7. Monitor on telemetry for any tachycardia or bradycardia arrhythmias  8. Maintain potassium greater than 4, magnesium greater than 2  9. GI/DVT prophylaxis  10. Pulmonary recommendations--plan for PET scan as outpatient for further evaluation of left upper lobe lung nodule and if needed will consider CT-guided biopsy, consider sleep apnea study as outpatient  11. Internal medicine recommendations  12. Consider patient event monitor for further evaluation of paroxysmal A. fib and possible underlying sick sinus syndrome  13. Coronary evaluation in future per Dr. Dicie Sacks  14. Will follow      Electronically signed by NANY Celestin on 6/25/2020 at 2:09 PM      Attending Supervising [de-identified] Attestation Statement  The patient is a 80 y.o. female. I have performed a history and physical examination of the patient. I discussed the case with the physician assistant. I reviewed the patient's Past Medical History, Past Surgical History, Medications, and Allergies.      Physical Exam:  Vitals:    06/24/20 1420 06/25/20 0029 06/25/20 0718 06/25/20 1332   BP:  (!) 156/96 117/68 (!) 149/70   Pulse:  63 102 112   Resp:  18 16 20   Temp:  97.3 °F (36.3 °C) 97.7 °F (36.5 °C) 97.7 °F (36.5 °C)   TempSrc:  Oral Oral Oral   SpO2: 100% 93% 93% 95%   Weight:

## 2020-06-26 ENCOUNTER — APPOINTMENT (OUTPATIENT)
Dept: CARDIAC CATH/INVASIVE PROCEDURES | Age: 85
DRG: 287 | End: 2020-06-26
Payer: MEDICARE

## 2020-06-26 LAB
ANION GAP SERPL CALCULATED.3IONS-SCNC: 14 MEQ/L (ref 9–15)
BUN BLDV-MCNC: 24 MG/DL (ref 8–23)
CALCIUM SERPL-MCNC: 9.3 MG/DL (ref 8.5–9.9)
CHLORIDE BLD-SCNC: 102 MEQ/L (ref 95–107)
CO2: 21 MEQ/L (ref 20–31)
CREAT SERPL-MCNC: 1.2 MG/DL (ref 0.5–0.9)
GFR AFRICAN AMERICAN: 51.7
GFR NON-AFRICAN AMERICAN: 42.7
GLUCOSE BLD-MCNC: 148 MG/DL (ref 70–99)
HCT VFR BLD CALC: 44.8 % (ref 37–47)
HEMOGLOBIN: 14.9 G/DL (ref 12–16)
INR BLD: 1.2
LV EF: 40 %
LVEF MODALITY: NORMAL
MCH RBC QN AUTO: 32.3 PG (ref 27–31.3)
MCHC RBC AUTO-ENTMCNC: 33.2 % (ref 33–37)
MCV RBC AUTO: 97.6 FL (ref 82–100)
PDW BLD-RTO: 13.7 % (ref 11.5–14.5)
PLATELET # BLD: 285 K/UL (ref 130–400)
POTASSIUM SERPL-SCNC: 4.4 MEQ/L (ref 3.4–4.9)
PROTHROMBIN TIME: 15 SEC (ref 12.3–14.9)
RBC # BLD: 4.6 M/UL (ref 4.2–5.4)
SODIUM BLD-SCNC: 137 MEQ/L (ref 135–144)
WBC # BLD: 10.3 K/UL (ref 4.8–10.8)

## 2020-06-26 PROCEDURE — 99232 SBSQ HOSP IP/OBS MODERATE 35: CPT | Performed by: INTERNAL MEDICINE

## 2020-06-26 PROCEDURE — 80048 BASIC METABOLIC PNL TOTAL CA: CPT

## 2020-06-26 PROCEDURE — 6370000000 HC RX 637 (ALT 250 FOR IP): Performed by: INTERNAL MEDICINE

## 2020-06-26 PROCEDURE — 93312 ECHO TRANSESOPHAGEAL: CPT

## 2020-06-26 PROCEDURE — 6360000002 HC RX W HCPCS: Performed by: INTERNAL MEDICINE

## 2020-06-26 PROCEDURE — B24BZZ4 ULTRASONOGRAPHY OF HEART WITH AORTA, TRANSESOPHAGEAL: ICD-10-PCS | Performed by: INTERNAL MEDICINE

## 2020-06-26 PROCEDURE — 99233 SBSQ HOSP IP/OBS HIGH 50: CPT | Performed by: INTERNAL MEDICINE

## 2020-06-26 PROCEDURE — 36415 COLL VENOUS BLD VENIPUNCTURE: CPT

## 2020-06-26 PROCEDURE — 85610 PROTHROMBIN TIME: CPT

## 2020-06-26 PROCEDURE — 93460 R&L HRT ART/VENTRICLE ANGIO: CPT | Performed by: INTERNAL MEDICINE

## 2020-06-26 PROCEDURE — 6360000002 HC RX W HCPCS

## 2020-06-26 PROCEDURE — 93010 ELECTROCARDIOGRAM REPORT: CPT | Performed by: INTERNAL MEDICINE

## 2020-06-26 PROCEDURE — 2709999900 HC NON-CHARGEABLE SUPPLY

## 2020-06-26 PROCEDURE — 6360000004 HC RX CONTRAST MEDICATION: Performed by: INTERNAL MEDICINE

## 2020-06-26 PROCEDURE — 4A023N8 MEASUREMENT OF CARDIAC SAMPLING AND PRESSURE, BILATERAL, PERCUTANEOUS APPROACH: ICD-10-PCS | Performed by: INTERNAL MEDICINE

## 2020-06-26 PROCEDURE — 93325 DOPPLER ECHO COLOR FLOW MAPG: CPT

## 2020-06-26 PROCEDURE — 93321 DOPPLER ECHO F-UP/LMTD STD: CPT

## 2020-06-26 PROCEDURE — 85027 COMPLETE CBC AUTOMATED: CPT

## 2020-06-26 PROCEDURE — 2060000000 HC ICU INTERMEDIATE R&B

## 2020-06-26 PROCEDURE — B2151ZZ FLUOROSCOPY OF LEFT HEART USING LOW OSMOLAR CONTRAST: ICD-10-PCS | Performed by: INTERNAL MEDICINE

## 2020-06-26 PROCEDURE — C1894 INTRO/SHEATH, NON-LASER: HCPCS

## 2020-06-26 PROCEDURE — 2580000003 HC RX 258

## 2020-06-26 PROCEDURE — 2580000003 HC RX 258: Performed by: NURSE PRACTITIONER

## 2020-06-26 PROCEDURE — 2500000003 HC RX 250 WO HCPCS: Performed by: INTERNAL MEDICINE

## 2020-06-26 PROCEDURE — C1769 GUIDE WIRE: HCPCS

## 2020-06-26 PROCEDURE — B2111ZZ FLUOROSCOPY OF MULTIPLE CORONARY ARTERIES USING LOW OSMOLAR CONTRAST: ICD-10-PCS | Performed by: INTERNAL MEDICINE

## 2020-06-26 PROCEDURE — 6370000000 HC RX 637 (ALT 250 FOR IP): Performed by: NURSE PRACTITIONER

## 2020-06-26 RX ORDER — METOPROLOL TARTRATE 5 MG/5ML
5 INJECTION INTRAVENOUS EVERY 6 HOURS PRN
Status: DISCONTINUED | OUTPATIENT
Start: 2020-06-26 | End: 2020-06-27 | Stop reason: HOSPADM

## 2020-06-26 RX ORDER — SODIUM CHLORIDE 9 MG/ML
INJECTION, SOLUTION INTRAVENOUS CONTINUOUS
Status: ACTIVE | OUTPATIENT
Start: 2020-06-26 | End: 2020-06-26

## 2020-06-26 RX ORDER — METOPROLOL TARTRATE 50 MG/1
50 TABLET, FILM COATED ORAL 2 TIMES DAILY
Status: DISCONTINUED | OUTPATIENT
Start: 2020-06-26 | End: 2020-06-27 | Stop reason: HOSPADM

## 2020-06-26 RX ORDER — BACTERIOSTATIC SODIUM CHLORIDE 0.9 %
VIAL (ML) INJECTION
Status: DISPENSED
Start: 2020-06-26 | End: 2020-06-27

## 2020-06-26 RX ADMIN — IOPAMIDOL 30 ML: 612 INJECTION, SOLUTION INTRAVENOUS at 13:39

## 2020-06-26 RX ADMIN — ASPIRIN 81 MG: 81 TABLET, CHEWABLE ORAL at 07:43

## 2020-06-26 RX ADMIN — DILTIAZEM HYDROCHLORIDE 30 MG: 30 TABLET, FILM COATED ORAL at 07:43

## 2020-06-26 RX ADMIN — GUAIFENESIN 600 MG: 600 TABLET, EXTENDED RELEASE ORAL at 07:43

## 2020-06-26 RX ADMIN — Medication 10 ML: at 19:18

## 2020-06-26 RX ADMIN — FUROSEMIDE 20 MG: 10 INJECTION, SOLUTION INTRAMUSCULAR; INTRAVENOUS at 07:43

## 2020-06-26 RX ADMIN — DILTIAZEM HYDROCHLORIDE 30 MG: 30 TABLET, FILM COATED ORAL at 16:14

## 2020-06-26 RX ADMIN — DILTIAZEM HYDROCHLORIDE 30 MG: 30 TABLET, FILM COATED ORAL at 20:30

## 2020-06-26 RX ADMIN — FUROSEMIDE 20 MG: 10 INJECTION, SOLUTION INTRAMUSCULAR; INTRAVENOUS at 19:48

## 2020-06-26 RX ADMIN — ENOXAPARIN SODIUM 80 MG: 80 INJECTION SUBCUTANEOUS at 22:30

## 2020-06-26 RX ADMIN — Medication 10 ML: at 07:44

## 2020-06-26 RX ADMIN — METOPROLOL TARTRATE 50 MG: 50 TABLET, FILM COATED ORAL at 19:18

## 2020-06-26 RX ADMIN — Medication 5 MG: at 20:00

## 2020-06-26 RX ADMIN — METOPROLOL TARTRATE 5 MG: 5 INJECTION INTRAVENOUS at 07:03

## 2020-06-26 RX ADMIN — ATORVASTATIN CALCIUM 20 MG: 20 TABLET, FILM COATED ORAL at 20:00

## 2020-06-26 RX ADMIN — GUAIFENESIN 600 MG: 600 TABLET, EXTENDED RELEASE ORAL at 20:00

## 2020-06-26 RX ADMIN — METOPROLOL TARTRATE 50 MG: 50 TABLET, FILM COATED ORAL at 07:43

## 2020-06-26 ASSESSMENT — ENCOUNTER SYMPTOMS
CHEST TIGHTNESS: 0
NAUSEA: 0
VOMITING: 0
COLOR CHANGE: 0
SHORTNESS OF BREATH: 1

## 2020-06-26 ASSESSMENT — PAIN SCALES - GENERAL
PAINLEVEL_OUTOF10: 0

## 2020-06-26 NOTE — FLOWSHEET NOTE
Report called to Pre/Post cath lab. Patient NPO. Scheduled for both BEBE and LHC/RHC. No acute distress at this time. Complaints of SOB at times with exertion, but denies presently. Pain free. MP AF rate 77. Will note changes on return.

## 2020-06-26 NOTE — PROGRESS NOTES
Report called to [facility]. Patient VSS. Denies discomfort at this time. Right groin without change; no bleeding or hematoma.

## 2020-06-26 NOTE — PROGRESS NOTES
of pt care. Some medical issues are handled by other specialists. Additional work up and treatment should be done in out pt setting by pt PCP and other out pt providers. In addition to examining and evaluating pt, I spent additional time explaining care, normaland abnormal findings, and treatment plan. All of pt questions were answered. Counseling, diet and education were provided. Case will be discussed with nursing staff when appropriate. Family will be updated if and when appropriate.        Electronically signed by José Manuel Yuan DO on 6/26/2020 at 10:57 AM

## 2020-06-26 NOTE — PROGRESS NOTES
Musculoskeletal: Normal range of motion. Right lower leg: Edema (trace) present. Left lower leg: Edema (trace) present. Skin:     General: Skin is warm and dry. Neurological:      General: No focal deficit present. Mental Status: She is alert and oriented to person, place, and time. Cranial Nerves: No cranial nerve deficit.    Psychiatric:         Mood and Affect: Mood normal.         Behavior: Behavior normal.         LABS:  CBC:   Lab Results   Component Value Date    WBC 10.2 06/24/2020    RBC 4.52 06/24/2020    HGB 14.6 06/24/2020    HCT 43.8 06/24/2020    MCV 96.8 06/24/2020    MCH 32.3 06/24/2020    MCHC 33.3 06/24/2020    RDW 13.8 06/24/2020     06/24/2020    MPV 9.7 07/09/2014     CBC with Differential:   Lab Results   Component Value Date    WBC 10.2 06/24/2020    RBC 4.52 06/24/2020    HGB 14.6 06/24/2020    HCT 43.8 06/24/2020     06/24/2020    MCV 96.8 06/24/2020    MCH 32.3 06/24/2020    MCHC 33.3 06/24/2020    RDW 13.8 06/24/2020    LYMPHOPCT 24.2 06/24/2020    MONOPCT 9.6 06/24/2020    BASOPCT 1.2 06/24/2020    MONOSABS 1.0 06/24/2020    LYMPHSABS 2.5 06/24/2020    EOSABS 0.2 06/24/2020    BASOSABS 0.1 06/24/2020     CMP:    Lab Results   Component Value Date     06/25/2020    K 4.1 06/25/2020     06/25/2020    CO2 23 06/25/2020    BUN 21 06/25/2020    CREATININE 1.12 06/25/2020    GFRAA 56.0 06/25/2020    LABGLOM 46.3 06/25/2020    GLUCOSE 138 06/25/2020    GLUCOSE 123 05/25/2012    PROT 6.9 06/24/2020    LABALBU 3.6 06/24/2020    LABALBU 4.2 05/25/2012    CALCIUM 9.3 06/25/2020    BILITOT 0.3 06/24/2020    ALKPHOS 50 06/24/2020    AST 23 06/24/2020    ALT 41 06/24/2020     BMP:    Lab Results   Component Value Date     06/25/2020    K 4.1 06/25/2020     06/25/2020    CO2 23 06/25/2020    BUN 21 06/25/2020    LABALBU 3.6 06/24/2020    LABALBU 4.2 05/25/2012    CREATININE 1.12 06/25/2020    CALCIUM 9.3 06/25/2020    GFRAA 56.0 06/25/2020 JOSE ANTONIO lung nodule per CTA of the chest 6/24/2020  10. Abnormal EKG/LBBB           Plan       1. Had a long talk with the patient regarding their symptoms, test results and the different treatment options available which include cardiac cath, alternate imaging modality and medical therapy. Patient would like to proceed with cardiac catheterization and understands the risks and benefits of the procedure. 2.  Maximize medical therapy- aspirin 81 mg p.o. daily, increase Lopressor to 50 mg p.o. twice daily, Lipitor 20 mg tablet p.o. daily at at bedtime, IV diuresis as tolerated currently with Lasix 20 mg IV twice daily, full dose anticoagulation with Lovenox 1 mg/kg subcu twice daily for now, consider addition of ACE inhibitor/angiotensin receptor blocker or Entresto in future if renal function remains stable and blood pressure tolerates  3. Add Cardizem 30mg PO q 6 hours. 4. PRN IV lopressor. 5. Continue with diuresis as tolerated  6. Will transition to oral anticoagulation at discharge, likely Eliquis. [id number] score = 5.  7. GI DVT prophylaxis  8. Keep potassium greater than 4 magnesium greater than 2  9.  Further recommendations to follow    Electronically signed by Carolyn Calix DO on 6/25/20 at 6:25 PM EDT

## 2020-06-26 NOTE — PROGRESS NOTES
CALCIUM 8.8 9.3 9.3   PROT 6.9  --   --    LABALBU 3.6  --   --    BILITOT 0.3  --   --    ALKPHOS 50  --   --    AST 23  --   --    ALT 41*  --   --    LABGLOM 51.5* 46.3* 42.7*   GFRAA >60.0 56.0* 51.7*   GLOB 3.3  --   --        MV Settings:          No results for input(s): PHART, RLL4JUY, PO2ART, IYF8RTX, BEART, C8HDMSZZ in the last 72 hours. O2 Device: None (Room air)  O2 Flow Rate (L/min): 2 L/min    DIET CARDIAC;     MEDICATIONS during current hospitalization:    Continuous Infusions:    Scheduled Meds:   metoprolol tartrate  50 mg Oral BID    dilTIAZem  30 mg Oral 4 times per day    [Held by provider] enoxaparin  1 mg/kg Subcutaneous BID    sodium chloride flush  10 mL Intravenous 2 times per day    guaiFENesin  600 mg Oral BID    furosemide  20 mg Intravenous BID    aspirin  81 mg Oral Daily    atorvastatin  20 mg Oral Nightly       PRN Meds:metoprolol, melatonin, sodium chloride flush, acetaminophen **OR** acetaminophen, polyethylene glycol, promethazine **OR** ondansetron, ipratropium-albuterol    Radiology  Cta Chest W Wo  (pe Study)    Result Date: 6/24/2020  EXAM: CTA CHEST W WO CONTRAST History: Shortness of breath Technique: Multiple contiguous axial images of the chest were obtained from the thoracic inlet through the upper abdomen with contrast. Multiplanar reformats including axial and coronal maximum intensity projection images were obtained. Comparison: Portable chest radiograph from earlier the same date Findings: Visualized portion of the thyroid gland is within normal limits. No axillary, mediastinal, or hilar lymphadenopathy. The degree of contrast opacification of the thoracic aorta precludes evaluation for aortic dissection. No thoracic aortic aneurysm. No filling defect within the pulmonary arteries. Heart size is mildly enlarged. Coronary artery calcifications are identified. No significant pericardial effusion. There is contrast reflux into the hepatic veins.   Esophagus is within normal limits. There is a 2.1 cm cluster of nodular densities within the left upper lobe as seen on axial series 3 image 23. Small consolidation within the left lower lobe. Small groundglass opacities in the right lower lobe and left lower lobe. Small to moderate bilateral pleural effusions. No pneumothorax. Degenerative changes of the thoracic spine. Multilevel Schmorl's nodes. Visualized upper abdomen demonstrates no acute abnormality. No pulmonary embolism. 2.1 cm cluster of nodular densities within the left upper lobe is nonspecific and may be infectious/inflammatory in etiology in the appropriate clinical setting however malignancy is not excluded and continued follow-up is recommended to ensure resolution. Small consolidation left lower lobe and bilateral lower lobe groundglass opacities may also be infectious/inflammatory in etiology. Mild cardiomegaly and evidence of coronary artery disease. Reflux of contrast into the hepatic veins suggests a degree of heart failure. Small to moderate bilateral pleural effusions. All CT scans at this facility use dose modulation, iterative reconstruction, and/or weight based dosing when appropriate to reduce radiation dose to as low as reasonably achievable. Xr Chest Portable    Result Date: 6/24/2020  EXAMINATION: XR CHEST PORTABLE CLINICAL HISTORY: SHORTNESS OF BREATH COMPARISONS: None available. FINDINGS: Osseous structures intact. Cardiopericardial silhouette enlarged. Aorta calcified. Pulmonary vasculature normal. Blunting left costophrenic angle. Ill-defined area increased opacity left lung base. Right lung clear. SMALL LEFT EFFUSION WITH LEFT LOWER LUNG ATELECTASIS/PNEUMONIA. Us Dup Lower Extremities Bilateral Venous    Result Date: 6/25/2020  EXAMINATION: US DUP LOWER EXTREMITIES BILATERAL VENOUS CLINICAL HISTORY: RIGHT CALF PAIN YESTERDAY. ASYMPTOMATIC TODAY.  FINDINGS: Compression, augmentation, and color flow is demonstrated bilaterally, at

## 2020-06-27 VITALS
HEIGHT: 61 IN | BODY MASS INDEX: 34.36 KG/M2 | DIASTOLIC BLOOD PRESSURE: 86 MMHG | SYSTOLIC BLOOD PRESSURE: 137 MMHG | TEMPERATURE: 98.2 F | HEART RATE: 115 BPM | RESPIRATION RATE: 15 BRPM | WEIGHT: 182 LBS | OXYGEN SATURATION: 94 %

## 2020-06-27 LAB
ANION GAP SERPL CALCULATED.3IONS-SCNC: 12 MEQ/L (ref 9–15)
BUN BLDV-MCNC: 26 MG/DL (ref 8–23)
CALCIUM SERPL-MCNC: 9 MG/DL (ref 8.5–9.9)
CHLORIDE BLD-SCNC: 106 MEQ/L (ref 95–107)
CO2: 24 MEQ/L (ref 20–31)
CREAT SERPL-MCNC: 1.11 MG/DL (ref 0.5–0.9)
GFR AFRICAN AMERICAN: 56.5
GFR NON-AFRICAN AMERICAN: 46.7
GLUCOSE BLD-MCNC: 120 MG/DL (ref 70–99)
HCT VFR BLD CALC: 40.4 % (ref 37–47)
HEMOGLOBIN: 13.6 G/DL (ref 12–16)
MCH RBC QN AUTO: 32.6 PG (ref 27–31.3)
MCHC RBC AUTO-ENTMCNC: 33.6 % (ref 33–37)
MCV RBC AUTO: 97.2 FL (ref 82–100)
PDW BLD-RTO: 13.4 % (ref 11.5–14.5)
PLATELET # BLD: 255 K/UL (ref 130–400)
POTASSIUM SERPL-SCNC: 3.8 MEQ/L (ref 3.4–4.9)
RBC # BLD: 4.15 M/UL (ref 4.2–5.4)
SODIUM BLD-SCNC: 142 MEQ/L (ref 135–144)
WBC # BLD: 9.1 K/UL (ref 4.8–10.8)

## 2020-06-27 PROCEDURE — 85027 COMPLETE CBC AUTOMATED: CPT

## 2020-06-27 PROCEDURE — 36415 COLL VENOUS BLD VENIPUNCTURE: CPT

## 2020-06-27 PROCEDURE — 6370000000 HC RX 637 (ALT 250 FOR IP): Performed by: NURSE PRACTITIONER

## 2020-06-27 PROCEDURE — 6370000000 HC RX 637 (ALT 250 FOR IP): Performed by: INTERNAL MEDICINE

## 2020-06-27 PROCEDURE — 99232 SBSQ HOSP IP/OBS MODERATE 35: CPT | Performed by: INTERNAL MEDICINE

## 2020-06-27 PROCEDURE — 80048 BASIC METABOLIC PNL TOTAL CA: CPT

## 2020-06-27 PROCEDURE — 99233 SBSQ HOSP IP/OBS HIGH 50: CPT | Performed by: INTERNAL MEDICINE

## 2020-06-27 PROCEDURE — 6360000002 HC RX W HCPCS: Performed by: INTERNAL MEDICINE

## 2020-06-27 PROCEDURE — 2580000003 HC RX 258: Performed by: NURSE PRACTITIONER

## 2020-06-27 RX ORDER — DILTIAZEM HYDROCHLORIDE 120 MG/1
120 CAPSULE, COATED, EXTENDED RELEASE ORAL DAILY
Qty: 30 CAPSULE | Refills: 3 | Status: SHIPPED | OUTPATIENT
Start: 2020-06-27 | End: 2020-08-17 | Stop reason: SDUPTHER

## 2020-06-27 RX ORDER — METOPROLOL TARTRATE 50 MG/1
50 TABLET, FILM COATED ORAL 2 TIMES DAILY
Qty: 60 TABLET | Refills: 3 | Status: SHIPPED | OUTPATIENT
Start: 2020-06-27 | End: 2020-08-17 | Stop reason: SDUPTHER

## 2020-06-27 RX ORDER — FUROSEMIDE 20 MG/1
20 TABLET ORAL DAILY
Qty: 60 TABLET | Refills: 3 | Status: SHIPPED | OUTPATIENT
Start: 2020-06-27 | End: 2020-08-17 | Stop reason: SDUPTHER

## 2020-06-27 RX ORDER — FUROSEMIDE 20 MG/1
20 TABLET ORAL DAILY
Status: DISCONTINUED | OUTPATIENT
Start: 2020-06-27 | End: 2020-06-27 | Stop reason: HOSPADM

## 2020-06-27 RX ORDER — ATORVASTATIN CALCIUM 20 MG/1
20 TABLET, FILM COATED ORAL NIGHTLY
Qty: 30 TABLET | Refills: 3 | Status: SHIPPED | OUTPATIENT
Start: 2020-06-27 | End: 2020-08-17 | Stop reason: SDUPTHER

## 2020-06-27 RX ADMIN — ENOXAPARIN SODIUM 80 MG: 80 INJECTION SUBCUTANEOUS at 09:46

## 2020-06-27 RX ADMIN — Medication 10 ML: at 09:47

## 2020-06-27 RX ADMIN — ASPIRIN 81 MG: 81 TABLET, CHEWABLE ORAL at 09:46

## 2020-06-27 RX ADMIN — FUROSEMIDE 20 MG: 10 INJECTION, SOLUTION INTRAMUSCULAR; INTRAVENOUS at 09:46

## 2020-06-27 RX ADMIN — METOPROLOL TARTRATE 50 MG: 50 TABLET, FILM COATED ORAL at 09:46

## 2020-06-27 RX ADMIN — SACUBITRIL AND VALSARTAN 1 TABLET: 24; 26 TABLET, FILM COATED ORAL at 13:05

## 2020-06-27 RX ADMIN — DILTIAZEM HYDROCHLORIDE 30 MG: 30 TABLET, FILM COATED ORAL at 06:09

## 2020-06-27 RX ADMIN — DILTIAZEM HYDROCHLORIDE 30 MG: 30 TABLET, FILM COATED ORAL at 12:02

## 2020-06-27 RX ADMIN — GUAIFENESIN 600 MG: 600 TABLET, EXTENDED RELEASE ORAL at 09:46

## 2020-06-27 ASSESSMENT — ENCOUNTER SYMPTOMS
CHEST TIGHTNESS: 0
SHORTNESS OF BREATH: 1
VOMITING: 0
NAUSEA: 0
COLOR CHANGE: 0

## 2020-06-27 NOTE — PROGRESS NOTES
06/27/2020     06/27/2020    CO2 24 06/27/2020    BUN 26 06/27/2020    LABALBU 3.6 06/24/2020    LABALBU 4.2 05/25/2012    CREATININE 1.11 06/27/2020    CALCIUM 9.0 06/27/2020    GFRAA 56.5 06/27/2020    LABGLOM 46.7 06/27/2020    GLUCOSE 120 06/27/2020    GLUCOSE 123 05/25/2012     Magnesium:    Lab Results   Component Value Date    MG 2.1 06/24/2020     Troponin:    Lab Results   Component Value Date    TROPONINI 0.030 06/24/2020       Radiology:  Cta Chest W Wo  (pe Study)    Result Date: 6/24/2020  EXAM: CTA CHEST W WO CONTRAST History: Shortness of breath Technique: Multiple contiguous axial images of the chest were obtained from the thoracic inlet through the upper abdomen with contrast. Multiplanar reformats including axial and coronal maximum intensity projection images were obtained. Comparison: Portable chest radiograph from earlier the same date Findings: Visualized portion of the thyroid gland is within normal limits. No axillary, mediastinal, or hilar lymphadenopathy. The degree of contrast opacification of the thoracic aorta precludes evaluation for aortic dissection. No thoracic aortic aneurysm. No filling defect within the pulmonary arteries. Heart size is mildly enlarged. Coronary artery calcifications are identified. No significant pericardial effusion. There is contrast reflux into the hepatic veins. Esophagus is within normal limits. There is a 2.1 cm cluster of nodular densities within the left upper lobe as seen on axial series 3 image 23. Small consolidation within the left lower lobe. Small groundglass opacities in the right lower lobe and left lower lobe. Small to moderate bilateral pleural effusions. No pneumothorax. Degenerative changes of the thoracic spine. Multilevel Schmorl's nodes. Visualized upper abdomen demonstrates no acute abnormality. No pulmonary embolism.  2.1 cm cluster of nodular densities within the left upper lobe is nonspecific and may be infectious/inflammatory

## 2020-06-27 NOTE — PROGRESS NOTES
demonstrates no acute abnormality. No pulmonary embolism. 2.1 cm cluster of nodular densities within the left upper lobe is nonspecific and may be infectious/inflammatory in etiology in the appropriate clinical setting however malignancy is not excluded and continued follow-up is recommended to ensure resolution. Small consolidation left lower lobe and bilateral lower lobe groundglass opacities may also be infectious/inflammatory in etiology. Mild cardiomegaly and evidence of coronary artery disease. Reflux of contrast into the hepatic veins suggests a degree of heart failure. Small to moderate bilateral pleural effusions. All CT scans at this facility use dose modulation, iterative reconstruction, and/or weight based dosing when appropriate to reduce radiation dose to as low as reasonably achievable. Xr Chest Portable    Result Date: 6/24/2020  EXAMINATION: XR CHEST PORTABLE CLINICAL HISTORY: SHORTNESS OF BREATH COMPARISONS: None available. FINDINGS: Osseous structures intact. Cardiopericardial silhouette enlarged. Aorta calcified. Pulmonary vasculature normal. Blunting left costophrenic angle. Ill-defined area increased opacity left lung base. Right lung clear. SMALL LEFT EFFUSION WITH LEFT LOWER LUNG ATELECTASIS/PNEUMONIA. Us Dup Lower Extremities Bilateral Venous    Result Date: 6/25/2020  EXAMINATION: US DUP LOWER EXTREMITIES BILATERAL VENOUS CLINICAL HISTORY: RIGHT CALF PAIN YESTERDAY. ASYMPTOMATIC TODAY. FINDINGS: Compression, augmentation, and color flow is demonstrated bilaterally, at the level the common femoral veins, proximal, mid, and distal superficial femoral veins, and popliteal veins. Compression and color flow is demonstrated at the level of the infrapopliteal venous vasculature. NO SONOGRAPHIC EVIDENCE, DEEP VEIN THROMBOSIS, BILATERAL LOWER EXTREMITIES. IMPRESSION AND SUGGESTION:  1.  Dyspnea associated with valvular heart disease, congestive heart failure and pulmonary hypertension  2. Pulmonary hypertension associated with cardiac, diastolic, and valvular heart disease  3.  Left upper lobe lung nodule, will need follow-up evaluation and PET scan as an outpatient  Patient is stable from the pulmonary standpoint for discharge anytime          Electronically signed by Joana Antonio MD, FCCP on 6/27/2020 at 12:26 PM

## 2020-06-27 NOTE — PROGRESS NOTES
ethnic, cultural, sacramental, or spiritual [place] needs you would like us to be aware of while you are in the hospital?: No    Care Plan:        [id number] [address]   Electronically signed by Gary Buck on 6/27/20 at 1:34 PM EDT     To reach a  for emotional and spiritual support, place an [facility] consult request.   If a  is needed immediately, dial 0 and ask to page the on-call .

## 2020-06-29 ENCOUNTER — CARE COORDINATION (OUTPATIENT)
Dept: CASE MANAGEMENT | Age: 85
End: 2020-06-29

## 2020-06-29 LAB
BLOOD CULTURE, ROUTINE: NORMAL
CULTURE, BLOOD 2: NORMAL

## 2020-06-29 PROCEDURE — 1111F DSCHRG MED/CURRENT MED MERGE: CPT | Performed by: INTERNAL MEDICINE

## 2020-06-29 PROCEDURE — 93320 DOPPLER ECHO COMPLETE: CPT | Performed by: INTERNAL MEDICINE

## 2020-06-29 PROCEDURE — 93312 ECHO TRANSESOPHAGEAL: CPT | Performed by: INTERNAL MEDICINE

## 2020-06-29 PROCEDURE — 93325 DOPPLER ECHO COLOR FLOW MAPG: CPT | Performed by: INTERNAL MEDICINE

## 2020-06-29 NOTE — CARE COORDINATION
Time Provider Yuridia López   7/7/2020 11:45 AM DO TRENTON Otero CARDIO [facility] EMERGENCY [facility] CENTER AT [place]   7/9/2020  9:00 AM Aris Willams, [address] [facility] EMERGENCY [facility] C[facility] AT [place]   7/20/2020  2:30 PM Belgica Levin MD [address] [place] [address]

## 2020-07-06 ENCOUNTER — HOSPITAL ENCOUNTER (OUTPATIENT)
Dept: CT IMAGING | Age: 85
Discharge: HOME | End: 2020-07-08
Payer: MEDICARE

## 2020-07-06 PROCEDURE — 78815 PET IMAGE W/CT SKULL-THIGH: CPT

## 2020-07-06 PROCEDURE — 3430000000 HC RX DIAGNOSTIC RADIOPHARMACEUTICAL: Performed by: INTERNAL MEDICINE

## 2020-07-06 PROCEDURE — A9552 F18 FDG: HCPCS | Performed by: INTERNAL MEDICINE

## 2020-07-06 RX ORDER — FLUDEOXYGLUCOSE F 18 200 MCI/ML
15 INJECTION, SOLUTION INTRAVENOUS
Status: COMPLETED | OUTPATIENT
Start: 2020-07-06 | End: 2020-07-06

## 2020-07-06 RX ADMIN — FLUDEOXYGLUCOSE F 18 15 MILLICURIE: 200 INJECTION, SOLUTION INTRAVENOUS at 11:26

## 2020-07-07 ENCOUNTER — VIRTUAL VISIT (OUTPATIENT)
Dept: CARDIOLOGY CLINIC | Age: 85
End: 2020-07-07
Payer: MEDICARE

## 2020-07-07 ENCOUNTER — CARE COORDINATION (OUTPATIENT)
Dept: CASE MANAGEMENT | Age: 85
End: 2020-07-07

## 2020-07-07 PROCEDURE — 99443 PR PHYS/QHP TELEPHONE EVALUATION 21-30 MIN: CPT | Performed by: INTERNAL MEDICINE

## 2020-07-07 ASSESSMENT — ENCOUNTER SYMPTOMS
EYES NEGATIVE: 1
GASTROINTESTINAL NEGATIVE: 1
ALLERGIC/IMMUNOLOGIC NEGATIVE: 1
SHORTNESS OF BREATH: 1
NAUSEA: 0
ABDOMINAL PAIN: 0
WHEEZING: 0
VOMITING: 0

## 2020-07-07 NOTE — PROGRESS NOTES
7/7/2020    TELEHEALTH EVALUATION -- Audio/Visual (During MTYCY-12 public health emergency)    Due to COVID 19 outbreak, patient's office visit was converted to a virtual visit. Patient was contacted and agreed to proceed with a virtual visit via Telephone Visit  The risks and benefits of converting to a virtual visit were discussed in light of the current infectious disease epidemic. Patient also understood that insurance coverage and co-pays are up to their individual insurance plans. HPI:      6-24-20: : Patient is a 80 y.o. female who presents with a chief complaint of fatigue. Patient is followed on a regular basis by Dr. Wellington Freeman MD.  Patient with past medical history of hypertension and hyperlipidemia. Recently returned from [place] 2 weeks ago. She complains of not feeling well and fatiguing very easily. She also complains of shortness of breath with minimal exertion. She complains of right tibial leg pain. She denies any history of myocardial infarction, congestive heart failure or arrhythmia. She denies any history of stress test or cardiac catheterization. She denies history of diabetes or tobacco abuse. He was noted to be in new onset atrial fibrillation with rapid ventricle response and placed on IV Cardizem drip and has since spontaneously converted to normal sinus rhythm. CT of the chest shows no evidence of pulmonary believes him, mild cardiomegaly, coronary calcifications as well is reflux of contrast into the hepatic vein suggesting a degree of heart failure. There is small to moderate bilateral pleural effusions. Also noted is a left lower lobe infiltrate/pneumonia. Her second troponin is mildly elevated at 0.03. Blood pressure significant elevated on presentation at 159/102.    6-27-20: converted to NSR. S/p BEBE/LHC and RHC with normal coronaries, mod PHTN, mod MR and severe TR. denies any chest pain or shortness of breath at this time.   He is on Lovenox subcu full dose and IV Lasix.     20: Echo with EF of 35-40%, severe TR and mod to severe MR, RVSP of 60mmHg. Doing ok today. Remains in afib with RVR on tele. 20  Hany George (:  1935) has requested an audio/video evaluation for the following concern(s):    Status post hospitalization for acute decompensated heart failure and new onset atrial fibrillation. She states she is improving every day. She is feeling better. Was placed on oral anticoagulation, Lopressor, Lipitor, Lasix p.o. Is on Cardizem  mg daily she was placed on Entresto. Pt denies chest pain, dyspnea, dyspnea on exertion, change in exercise capacity, fatigue,  nausea, vomiting, diarrhea, constipation, motor weakness, insomnia, weight loss, syncope, dizziness, lightheadedness, palpitations, PND, orthopnea, or claudication. He is compliant with her medications. No bleeding issues. Review of Systems   Constitutional: Negative. Negative for chills and fever. HENT: Negative. Eyes: Negative. Respiratory: Positive for shortness of breath. Negative for wheezing. Cardiovascular: Negative for chest pain, palpitations and leg swelling. Gastrointestinal: Negative. Negative for abdominal pain, nausea and vomiting. Endocrine: Negative. Genitourinary: Negative. Musculoskeletal: Negative. Skin: Negative. Negative for rash. Allergic/Immunologic: Negative. Neurological: Negative for dizziness, weakness and headaches. Psychiatric/Behavioral: Negative. Prior to Visit Medications    Medication Sig Taking?  Authorizing Provider   atorvastatin (LIPITOR) 20 MG tablet Take 1 tablet by mouth nightly  Richar Alatorre,    rivaroxaban (XARELTO) 20 MG TABS tablet Take 1 tablet by mouth daily  David LINDQUIST Holiday, DO   metoprolol tartrate (LOPRESSOR) 50 MG tablet Take 1 tablet by mouth 2 times daily  [facility] Holiday, DO   sacubitril-valsartan (ENTRESTO) 24-26 MG per tablet Take 1 tablet by mouth 2 times daily  [facility] Holiday, DO   furosemide (LASIX) 20 MG tablet Take 1 tablet by mouth daily  David J Holiday, DO   dilTIAZem (CARDIZEM CD) 120 MG extended release capsule Take 1 capsule by mouth daily  David J Holiday, DO   acetaminophen (TYLENOL) 325 MG tablet Take 650 mg by mouth every 6 hours as needed. Historical Provider, MD   oxybutynin (DITROPAN) 5 MG tablet Take 1 tablet by mouth 3 times daily. Tania Kirkland MD   diphenhydrAMINE (BENADRYL) 25 MG tablet Take 25 mg by mouth nightly as needed. For sleep   Historical Provider, MD   aspirin 81 MG EC tablet Take 81 mg by mouth daily. Historical Provider, MD   multivitamin (ANTIOXIDANT;PROSIGHT) TABS per tablet Take 1 tablet by mouth daily.     Historical Provider, MD       Social History     Tobacco Use    Smoking status: Never Smoker    Smokeless tobacco: Never Used   Substance Use Topics    Alcohol use: No    Drug use: Never        No Known Allergies,   Past Medical History:   Diagnosis Date    Atrial fibrillation (HCC)     Depression     Elevated glucose     HTN (hypertension)     Hyperlipidemia     Osteopenia     Hips    Rheumatic heart disease     as a child    Rib fractures     Secondary to AA   ,   Past Surgical History:   Procedure Laterality Date    APPENDECTOMY       SECTION      COLONOSCOPY      Internal hemorrhoids    COLONOSCOPY      normal    EYE SURGERY      HYSTERECTOMY     ,   Family History   Problem Relation Age of Onset    Heart Disease Mother         Valvular heart disease from childhood rheumatic fever    Diabetes Maternal Aunt        PHYSICAL EXAMINATION:  [ INSTRUCTIONS:  \"[x]\" Indicates a positive item  \"[]\" Indicates a negative item  -- DELETE ALL ITEMS NOT EXAMINED]  [x] Alert  [x] Oriented to person/place/time    [x] No apparent distress  [] Toxic appearing    [] Face flushed appearing [x] Sclera clear  [] Lips are cyanotic      [x] Breathing appears normal  [] Appears tachypneic      [] Rash on visible liver enzymes, BUN, CR, and electrolytes. Cont with DOAC    Recheck LVF in 3 months    Follow up with CHF clinic. Check EKG next OV    Check labs with CHF clinic visit      The importance of daily weights, dietary sodium restriction, and contact with cardiology if weight is increased more than 3 lbs in any 48 hour period was stressed. The patient has been advised to contact us if they experience progressive SOB, orthopnea, PND or progressive edema. Details of medical condition explained and patient was warned about adverse consequences of uncontrolled medical conditions and possible side effects of prescribed medications. No follow-ups on file. An  electronic signature was used to authenticate this note. --Edison Bullard, DO on 7/7/2020 at 11:48 AM  9}    Pursuant to the emergency declaration under the [facility], [facility] waiver authority and the [facility] and [employer] Act, this Virtual  Visit was conducted, with patient's consent, to reduce the patient's risk of exposure to COVID-19 and provide continuity of care for an established patient. Services were provided through a video synchronous discussion virtually to substitute for in-person clinic visit. Total Virtual Visit time spent:21      Please note this report has been partially produced using speech recognition software and may cause contain errors related to that system including grammar, punctuation and spelling as well as words and phrases that may seem inappropriate. If there are questions or concerns please feel free to contact me to clarify.

## 2020-07-07 NOTE — CARE COORDINATION
[facility] Transitions Follow Up Call    2020    Patient: Adarsh Zimmerman  Patient : 1935   MRN: <[id number]>  Reason for Admission:   Discharge Date: 20 RARS: Readmission Risk Score: 13    Follow Up:  2020 Final  for admission 20-20. Patient no longer eligible for BPCI bundle due to excluded DRG.        Vicky Perez RN

## 2020-07-09 ENCOUNTER — OFFICE VISIT (OUTPATIENT)
Dept: CARDIOLOGY CLINIC | Age: 85
End: 2020-07-09
Payer: MEDICARE

## 2020-07-09 VITALS
SYSTOLIC BLOOD PRESSURE: 118 MMHG | OXYGEN SATURATION: 98 % | WEIGHT: 175 LBS | DIASTOLIC BLOOD PRESSURE: 82 MMHG | HEART RATE: 64 BPM | HEIGHT: 61 IN | BODY MASS INDEX: 33.04 KG/M2 | RESPIRATION RATE: 18 BRPM

## 2020-07-09 DIAGNOSIS — I50.21 ACUTE SYSTOLIC CHF (CONGESTIVE HEART FAILURE), NYHA CLASS 2 (HCC): ICD-10-CM

## 2020-07-09 LAB
ANION GAP SERPL CALCULATED.3IONS-SCNC: 10 MEQ/L (ref 9–15)
BUN BLDV-MCNC: 24 MG/DL (ref 8–23)
CALCIUM SERPL-MCNC: 9.8 MG/DL (ref 8.5–9.9)
CHLORIDE BLD-SCNC: 103 MEQ/L (ref 95–107)
CO2: 30 MEQ/L (ref 20–31)
CREAT SERPL-MCNC: 1.16 MG/DL (ref 0.5–0.9)
GFR AFRICAN AMERICAN: 53.7
GFR NON-AFRICAN AMERICAN: 44.4
GLUCOSE BLD-MCNC: 91 MG/DL (ref 70–99)
POTASSIUM SERPL-SCNC: 4.1 MEQ/L (ref 3.4–4.9)
PRO-BNP: 2004 PG/ML
SODIUM BLD-SCNC: 143 MEQ/L (ref 135–144)

## 2020-07-09 PROCEDURE — 93000 ELECTROCARDIOGRAM COMPLETE: CPT | Performed by: PHYSICIAN ASSISTANT

## 2020-07-09 PROCEDURE — 1111F DSCHRG MED/CURRENT MED MERGE: CPT | Performed by: PHYSICIAN ASSISTANT

## 2020-07-09 PROCEDURE — 1090F PRES/ABSN URINE INCON ASSESS: CPT | Performed by: PHYSICIAN ASSISTANT

## 2020-07-09 PROCEDURE — G8417 CALC BMI ABV UP PARAM F/U: HCPCS | Performed by: PHYSICIAN ASSISTANT

## 2020-07-09 PROCEDURE — 99215 OFFICE O/P EST HI 40 MIN: CPT | Performed by: PHYSICIAN ASSISTANT

## 2020-07-09 PROCEDURE — 1036F TOBACCO NON-USER: CPT | Performed by: PHYSICIAN ASSISTANT

## 2020-07-09 PROCEDURE — G8427 DOCREV CUR MEDS BY ELIG CLIN: HCPCS | Performed by: PHYSICIAN ASSISTANT

## 2020-07-09 PROCEDURE — G8399 PT W/DXA RESULTS DOCUMENT: HCPCS | Performed by: PHYSICIAN ASSISTANT

## 2020-07-09 PROCEDURE — 4040F PNEUMOC VAC/ADMIN/RCVD: CPT | Performed by: PHYSICIAN ASSISTANT

## 2020-07-09 PROCEDURE — 1123F ACP DISCUSS/DSCN MKR DOCD: CPT | Performed by: PHYSICIAN ASSISTANT

## 2020-07-09 ASSESSMENT — ENCOUNTER SYMPTOMS
ABDOMINAL PAIN: 0
SHORTNESS OF BREATH: 1
COLOR CHANGE: 0
VOMITING: 0
BLOOD IN STOOL: 0
NAUSEA: 0
COUGH: 0
CHEST TIGHTNESS: 0

## 2020-07-09 NOTE — PROGRESS NOTES
Patient: Lay Adkins  YOB: 1935  MRN: [id number]    Chief Complaint:  Chief Complaint   Patient presents with    Congestive Heart Failure     systolic    Shortness of Breath     OCCASIONAL WITH EXERTION    Fatigue         Subjective/HPI      7/9/20 CHF clinic visit #1: This is a very pleasant 59-year-old  female who presents for initial CHF clinic evaluation following recent hospital admission for new onset A. fib RVR and new onset congestive heart failure. Echocardiogram completed during hospital admission revealed reduced LV systolic function with ejection fraction of 35 to 40% and 3-4+ MR with RVSP of 60 mmHg. She was initiated on IV diuresis with Lasix as well anticoagulation with full dose Lovenox and optimized on rate control and CHF medications. Given new onset congestive heart failure, mild troponin elevation, new onset A. fib and cardiomyopathy patient underwent cardiac catheterization on 6/26/2020 to rule out underlying cardiac ischemia. Cardiac catheterization revealed normal coronary arteries with EF of 45%. Also, patient underwent BEBE on 6/26/2020 which revealed 2+ MR, RVSP 48 mmHg and EF of 40%. Patient had initially converted back to sinus rhythm after initiation of IV Cardizem in the emergency room however she converted back to A. fib RVR during admission. Rate control medications were titrated and on 6/27/2020 she had converted back to sinus rhythm. Asymptomatically improved during the course of her admission. She was transitioned to oral anticoagulation with Xarelto at discharge. She was discharged home in stable condition on 6/27/2020. Since discharge, patient states that she has been doing very well overall and has had continued improvement in her shortness of breath.   She will still experience shortness of breath with moderate exertion such as when walking up a flight of steps but otherwise is able to carry out her normal activities without any trouble breathing. She admits to being sedentary recently not feeling like doing many of the normal activities that she used to. Denies chest pain, palpitations, diaphoresis, paroxysmal nocturnal dyspnea, orthopnea, lower extremity edema, syncope, cough, fever or chills. Denies any difficulty urinating or any painful urination. She denies any bleeding issues on Xarelto including hematochezia or melena. He is adherent to a low-sodium diet as well as fluid restriction. She has been weighing herself on a daily basis and weight at home this morning was 175 pounds compared to 182 pounds on day of discharge from the hospital.    EKG in office today shows sinus rhythm with ventricular rate of 63 bpm, ST depression in leads I and aVL and poor R wave progression in V2 through V6,  ms. Recent labs reviewed and documented below. BMP 6/27/2020: Sodium 142, potassium 3.8, chloride 106, total CO2 24, BUN 12, creatinine 1.11, GFR 46.7, glucose 120  CBC 6/26/2020: WBC 10.3, hemoglobin 14.9, hematocrit 44.8, platelets 999  proBNP 6/24/2020: 4654  TSH 6/24/2020: 1.40      Vital signs stable in office today with blood pressure 118/82, heart rate 64, pulse ox 98% on room air. Weight is 175 pounds      Echocardiogram 6/24/20:  Conclusions   Summary   Left ventricular ejection fraction is visually estimated at 35-40%. global   hypokinesis. difficult to estimate due to afib. Left ventricular size is normal .   Pseudonormal filling pattern noted. Moderately severe (3-4+) mitral regurgitation is present. No evidence of mitral valve stenosis. No evidence of aortic valve regurgitation . No evidence of aortic valve stenosis. There is severe tricuspid regurgitation with estimated RVSP of 60 mm Hg. Right ventricular systolic pressure of 60 mm Hg consistent with moderate   pulmonary hypertension. The left atrium is Moderately dilated.    Signature ----------------------------------------------------------------   Electronically signed by Param Velazquez DO(Interpreting   physician) on 2020 05:56 PM    [facility] 20:  Conclusions  Procedure Summary  normal coronaries  normal LVF  PCWP 20mmHg  mod PHTN  normal C. I and C.O  no shunts. Recommendations  Aggressive risk factor management. Maximize medical therapy. Angiographic Findings   Cardiac Arteries and Lesion Findings  LMCA: Normal.  LAD: small caliber, tapers distally. LCx: Normal.  RCA: Normal.  Dominance: Right  LV function assessed as:Abnormal.  Ejection Fraction    - Method: LV gram. EF%: 45. BEBE 20:  Conclusions   Summary   Left ventricular ejection fraction is visually estimated at 40%. Left ventricular size is mildly increased . Moderate (2+) mitral regurgitation is present. No evidence of aortic valve regurgitation . There is mild tricuspid regurgitation with estimated RVSP of 48 mm Hg. No evidence of left to right shunt with color flow doppler or right to   left shunt with agitated saline contrast study. Moderately dilated LA. Recommendation   [facility] to follow.    Signature   ----------------------------------------------------------------   Electronically signed by Param Velazquez DO(Interpreting   physician) on 2020 09:26 AM      PMHx:  Newly diagnosed A-fib  Normal coronaries per [facility] 20  Moderate 2+ MR  Systolic CHF     PSHx:   x 3  Tonsillectomy  Hysterectomy    Allergies:  None    Social Hx:  No smoking  Social drinker       No Known Allergies    Current Outpatient Medications   Medication Sig Dispense Refill    atorvastatin (LIPITOR) 20 MG tablet Take 1 tablet by mouth nightly 30 tablet 3    rivaroxaban (XARELTO) 20 MG TABS tablet Take 1 tablet by mouth daily 30 tablet 3    metoprolol tartrate (LOPRESSOR) 50 MG tablet Take 1 tablet by mouth 2 times daily 60 tablet 3    sacubitril-valsartan (ENTRESTO) 24-26 MG per tablet Take 1 tablet by mouth 2 times daily 60 tablet 3    furosemide (LASIX) 20 MG tablet Take 1 tablet by mouth daily 60 tablet 3    dilTIAZem (CARDIZEM CD) 120 MG extended release capsule Take 1 capsule by mouth daily 30 capsule 3    acetaminophen (TYLENOL) 325 MG tablet Take 650 mg by mouth every 6 hours as needed.  oxybutynin (DITROPAN) 5 MG tablet Take 1 tablet by mouth 3 times daily. (Patient taking differently: Take 5 mg by mouth 2 times daily ) 270 tablet 1    diphenhydrAMINE (BENADRYL) 25 MG tablet Take 25 mg by mouth nightly as needed. For sleep       aspirin 81 MG EC tablet Take 81 mg by mouth daily.  multivitamin (ANTIOXIDANT;PROSIGHT) TABS per tablet Take 1 tablet by mouth daily. No current facility-administered medications for this visit.         Past Medical History:   Diagnosis Date    Atrial fibrillation (HCC)     Depression     Elevated glucose     HTN (hypertension)     Hyperlipidemia     Osteopenia     Hips    Rheumatic heart disease     as a child    Rib fractures     Secondary to AA       Past Surgical History:   Procedure Laterality Date    APPENDECTOMY       SECTION      COLONOSCOPY      Internal hemorrhoids    COLONOSCOPY      normal    EYE SURGERY      HYSTERECTOMY         Social History     Socioeconomic History    Marital status:      Spouse name: None    Number of children: None    Years of education: None    Highest education level: None   Occupational History    None   Social Needs    Financial resource strain: None    Food insecurity     Worry: None     Inability: None    Transportation needs     Medical: None     Non-medical: None   Tobacco Use    Smoking status: Never Smoker    Smokeless tobacco: Never Used   Substance and Sexual Activity    Alcohol use: No    Drug use: Never    Sexual activity: Not Currently     Partners: Male   Lifestyle    Physical activity     Days per week: None     Minutes per session: None    Stress: None Relationships    Social connections     Talks on phone: None     Gets together: None     Attends Mandaeism service: None     Active member of club or organization: None     Attends meetings of clubs or organizations: None     Relationship status: None    Intimate partner violence     Fear of current or ex partner: None     Emotionally abused: None     Physically abused: None     Forced sexual activity: None   Other Topics Concern    None   Social History Narrative    None       Family History   Problem Relation Age of Onset    Heart Disease Mother         Valvular heart disease from childhood rheumatic fever    Diabetes Maternal Aunt          Review of Systems:   Review of Systems   Constitutional: Positive for activity change (less active). Negative for fatigue and fever. HENT: Negative for congestion. Respiratory: Positive for shortness of breath (with moderate exertion, improved). Negative for cough and chest tightness. Cardiovascular: Negative for chest pain, palpitations and leg swelling. Gastrointestinal: Negative for abdominal pain, blood in stool, nausea and vomiting. Genitourinary: Negative for difficulty urinating, dysuria and hematuria. Musculoskeletal: Negative for arthralgias and myalgias. Skin: Negative for color change, pallor and rash. Neurological: Negative for dizziness, syncope and light-headedness. Psychiatric/Behavioral: Negative for agitation and behavioral problems. Physical Examination:    /82 (Site: Right Upper Arm, Position: Sitting, Cuff Size: Large Adult)   Pulse 64   Resp 18   Ht 5' 1\" (1.549 m)   Wt 175 lb (79.4 kg)   SpO2 98%   BMI 33.07 kg/m²    Physical Exam  Constitutional:       General: She is not in acute distress. Appearance: Normal appearance. HENT:      Head: Normocephalic and atraumatic. Neck:      Musculoskeletal: Normal range of motion and neck supple.    Cardiovascular:      Rate and Rhythm: Normal rate and regular Results   Component Value Date     06/27/2020    K 3.8 06/27/2020     06/27/2020    CO2 24 06/27/2020    BUN 26 06/27/2020    LABALBU 3.6 06/24/2020    LABALBU 4.2 05/25/2012    CREATININE 1.11 06/27/2020    CALCIUM 9.0 06/27/2020    GFRAA 56.5 06/27/2020    LABGLOM 46.7 06/27/2020    GLUCOSE 120 06/27/2020    GLUCOSE 123 05/25/2012     Magnesium:    Lab Results   Component Value Date    MG 2.1 06/24/2020     TSH:  Lab Results   Component Value Date    TSH 1.400 06/24/2020     . result  No results for input(s): PROBNP in the last 72 hours. No results for input(s): INR in the last 72 hours. Patient Active Problem List   Diagnosis    HTN (hypertension)    Elevated glucose    Hyperlipidemia    Osteopenia    A-fib ([facility])       Assessment/Plan:     Diagnosis Orders   1. Acute systolic CHF (congestive heart failure), NYHA class 2 ([facility])  Basic Metabolic Panel    Brain Natriuretic Peptide    compensated now   2. Paroxysmal atrial fibrillation ([facility])  EKG 12 lead    currently maintaining SR   3. Normal coronary arteries      per [facility] 6/26/20   4. Nonischemic cardiomyopathy ([facility]      EF 35-40% per echo 6/24/20   5. Moderate mitral regurgitation      per BEBE 6/24/20. 2D echo 6/24/20 with 3-4+ MR   6. Pulmonary HTN ([facility])      RVSP 48mmHg per BEBE 6/26/20   7. Renal insufficiency      creatinine 1.11 with GFR 46.7 per BMP 6/27/20       -Maximize medical therapy--aspirin 81 mg p.o. daily, Lopressor 50 mg p.o. twice daily, Cardizem  mg p.o. daily, Entresto 24/26 mg p.o. twice daily, Lasix 20 mg p.o. daily, Lipitor 20 mg tablet p.o. daily at at bedtime, Xarelto 20 mg p.o. daily  -Heart Failure appears compensated at this time.   No need for further medication adjustments  -Check BMP today to monitor renal function and electrolytes  -Check BNP today  -Cardiac/<2 gram sodium diet recommended  -1800-2000mL fluid restriction   -Instructed patient to weigh self daily every morning upon waking and keep log book of daily weights. Notify office if gaining more than 3 pounds in 48 hours. -Advised patient to notify office immediately if experiencing any progressive SOB, orthopnea, PND, LE edema or weight gain  -Educated patient on importance of fluid and salt restriction as well as lifestyle modification  -EKG in office today showing patient to be maintaining sinus rhythm  -Will plan to repeat limited echocardiogram in 3 months (around September 2020) to reevaluate LV function  -May need to consider Holter/event monitor in future for further evaluation of paroxysmal atrial fibrillation  -Discussed with patient regarding possible referral to cardiac rehab however she declines at this time  -Maintain routine outpatient follow-up with general cardiologist, Dr. Alyssa Roca routine outpatient follow-up with PCP  -F/U with CHF clinic in 1 month or sooner if needed        Counseling: The importance of daily weights, dietary sodium restriction, and contact with cardiology if weight is increased more than 3 lbs in any 48 hour period was stressed. The patient has been advised to contact us if theyexperience progressive SOB, orthopnea, PND or progressive edema.

## 2020-07-20 ENCOUNTER — OFFICE VISIT (OUTPATIENT)
Dept: PULMONOLOGY | Age: 85
End: 2020-07-20
Payer: MEDICARE

## 2020-07-20 VITALS
WEIGHT: 174 LBS | SYSTOLIC BLOOD PRESSURE: 138 MMHG | OXYGEN SATURATION: 97 % | HEART RATE: 97 BPM | DIASTOLIC BLOOD PRESSURE: 94 MMHG | TEMPERATURE: 96.4 F | HEIGHT: 62 IN | BODY MASS INDEX: 32.02 KG/M2

## 2020-07-20 PROCEDURE — 1111F DSCHRG MED/CURRENT MED MERGE: CPT | Performed by: INTERNAL MEDICINE

## 2020-07-20 PROCEDURE — 1036F TOBACCO NON-USER: CPT | Performed by: INTERNAL MEDICINE

## 2020-07-20 PROCEDURE — G8427 DOCREV CUR MEDS BY ELIG CLIN: HCPCS | Performed by: INTERNAL MEDICINE

## 2020-07-20 PROCEDURE — G8399 PT W/DXA RESULTS DOCUMENT: HCPCS | Performed by: INTERNAL MEDICINE

## 2020-07-20 PROCEDURE — 1090F PRES/ABSN URINE INCON ASSESS: CPT | Performed by: INTERNAL MEDICINE

## 2020-07-20 PROCEDURE — 99214 OFFICE O/P EST MOD 30 MIN: CPT | Performed by: INTERNAL MEDICINE

## 2020-07-20 PROCEDURE — 1123F ACP DISCUSS/DSCN MKR DOCD: CPT | Performed by: INTERNAL MEDICINE

## 2020-07-20 PROCEDURE — G8417 CALC BMI ABV UP PARAM F/U: HCPCS | Performed by: INTERNAL MEDICINE

## 2020-07-20 PROCEDURE — 4040F PNEUMOC VAC/ADMIN/RCVD: CPT | Performed by: INTERNAL MEDICINE

## 2020-07-20 NOTE — PROGRESS NOTES
Subjective:     Frank Padron is a 80 y.o. female who complains today of:     Chief Complaint   Patient presents with    Follow-Up from Hospital     dyspnea       HPI  Patient presents for lung nodule follow up    Patient was seen by me for shortness of breath was thought to be secondary to pulmonary hypertension with diastolic dysfunction, and volume overload. She feels much improved, no shortness of breath, no chest pain, no heartburn, weight is stable, no lower extremity edema, no fever no chills. No nausea no vomiting. She is complaining of right foot pain mainly the dorsal surface of her foot localized, with tenderness occurred suddenly when she woke up in the morning. She has wheezing. Allergies:  Patient has no known allergies.   Past Medical History:   Diagnosis Date    Atrial fibrillation ([address]     Depression     Elevated glucose     HTN (hypertension)     Hyperlipidemia     Osteopenia     Hips    Rheumatic heart disease     as a child    Rib fractures     Secondary to AA     Past Surgical History:   Procedure Laterality Date    APPENDECTOMY       SECTION      COLONOSCOPY      Internal hemorrhoids    COLONOSCOPY      normal    EYE SURGERY      HYSTERECTOMY       Family History   Problem Relation Age of Onset    Heart Disease Mother         Valvular heart disease from childhood rheumatic fever    Diabetes Maternal Aunt      Social History     Socioeconomic History    Marital status:      Spouse name: Not on file    Number of children: Not on file    Years of education: Not on file    Highest education level: Not on file   Occupational History    Not on file   Social Needs    Financial resource strain: Not on file    Food insecurity     Worry: Not on file     Inability: Not on file    Transportation needs     Medical: Not on file     Non-medical: Not on file   Tobacco Use    Smoking status: Never Smoker    Smokeless tobacco: Never Used   Substance and Sexual Activity    Alcohol use: No    Drug use: Never    Sexual activity: Not Currently     Partners: Male   Lifestyle    Physical activity     Days per week: Not on file     Minutes per session: Not on file    Stress: Not on file   Relationships    Social connections     Talks on phone: Not on file     Gets together: Not on file     Attends Mormonism service: Not on file     Active member of club or organization: Not on file     Attends meetings of clubs or organizations: Not on file     Relationship status: Not on file    Intimate partner violence     Fear of current or ex partner: Not on file     Emotionally abused: Not on file     Physically abused: Not on file     Forced sexual activity: Not on file   Other Topics Concern    Not on file   Social History Narrative    Not on file         Review of Systems      ROS: 10 organs review of system is done including general, psychological, ENT, hematological, endocrine, respiratory, cardiovascular, gastrointestinal,musculoskeletal, neurological,  allergy and Immunology is done and is otherwise negative. Current Outpatient Medications   Medication Sig Dispense Refill    atorvastatin (LIPITOR) 20 MG tablet Take 1 tablet by mouth nightly 30 tablet 3    rivaroxaban (XARELTO) 20 MG TABS tablet Take 1 tablet by mouth daily 30 tablet 3    metoprolol tartrate (LOPRESSOR) 50 MG tablet Take 1 tablet by mouth 2 times daily 60 tablet 3    sacubitril-valsartan (ENTRESTO) 24-26 MG per tablet Take 1 tablet by mouth 2 times daily 60 tablet 3    furosemide (LASIX) 20 MG tablet Take 1 tablet by mouth daily 60 tablet 3    dilTIAZem (CARDIZEM CD) 120 MG extended release capsule Take 1 capsule by mouth daily 30 capsule 3    acetaminophen (TYLENOL) 325 MG tablet Take 650 mg by mouth every 6 hours as needed.  oxybutynin (DITROPAN) 5 MG tablet Take 1 tablet by mouth 3 times daily.  (Patient taking differently: Take 5 mg by mouth 2 times daily ) 270 tablet 1    diphenhydrAMINE (BENADRYL) 25 MG tablet Take 25 mg by mouth nightly as needed. For sleep       aspirin 81 MG EC tablet Take 81 mg by mouth daily.  multivitamin (ANTIOXIDANT;PROSIGHT) TABS per tablet Take 1 tablet by mouth daily. No current facility-administered medications for this visit. Objective:     Vitals:    07/20/20 1431   BP: (!) 138/94   Site: Right Upper Arm   Position: Sitting   Cuff Size: Medium Adult   Pulse: 97   Temp: 96.4 °F (35.8 °C)   SpO2: 97%   Weight: 174 lb (78.9 kg)   Height: 5' 2\" (1.575 m)         Physical Exam  Constitutional:       General: She is not in acute distress. Appearance: She is well-developed. She is not diaphoretic. HENT:      Head: Normocephalic and atraumatic. Eyes:      Conjunctiva/sclera: Conjunctivae normal.      Pupils: Pupils are equal, round, and reactive to light. Neck:      Musculoskeletal: Normal range of motion and neck supple. Cardiovascular:      Rate and Rhythm: Normal rate and regular rhythm. Heart sounds: No murmur. No friction rub. No gallop. Pulmonary:      Effort: Pulmonary effort is normal. No respiratory distress. Breath sounds: Wheezing present. No rales. Chest:      Chest wall: No tenderness. Abdominal:      General: There is no distension. Palpations: Abdomen is soft. Tenderness: There is no abdominal tenderness. There is no rebound. Musculoskeletal:         General: No tenderness. Right lower leg: No edema. Left lower leg: No edema. Lymphadenopathy:      Cervical: No cervical adenopathy. Skin:     General: Skin is warm and dry. Findings: No erythema. Neurological:      Mental Status: She is alert and oriented to person, place, and time. Psychiatric:         Judgment: Judgment normal.         Imaging studies reviewed by me PET scan reviewed with the patient, left upper lobe cluster of nodules with minimal to no activity.   Lab results reviewed in chart  PFT none  ECHO: June 2020, shows EF 40%, +2 mitral regurgitation, RVSP 48    Assessment and Plan       Diagnosis Orders   1. Lung nodule  CT CHEST WO CONTRAST   2. Carloz Horn MD, Otolaryngology, [facility]    Full PFT Study With Bronchodilator   3. Right foot pain  Lore Winters DPM, Podiatry, [facility]   4. Pulmonary hypertension (HCC)       · Lung nodule is PET negative, discussed with the patient option for biopsy versus monitoring, I did recommend monitoring in light of no smoking history and low risk profile, patient is agreeable to monitoring plan, will repeat CT chest in 3 months, if remains stable will continue monitoring over 2 years. Of time. If increased in size will proceed with CT-guided biopsy. · Wheezing versus stridor, will refer to ENT and obtain PFT otherwise continue same asymptomatic  · Right foot pain, DVT is unlikely patient anticoagulated, will refer to podiatry  · Pulmonary hypertension likely group 2 owing to left heart disease mainly mitral regurgitation.       Orders Placed This Encounter   Procedures    CT CHEST WO CONTRAST     Standing Status:   Future     Standing Expiration Date:   7/20/2021     Order Specific Question:   Reason for exam:     Answer:   folow up    Lore Winters DPM, Podiatry, [facility]     Referral Priority:   Routine     Referral Type:   Eval and Treat     Referral Reason:   Specialty Services Required     Referred to Provider:   Bud Patel DPM     Requested Specialty:   Podiatry     Number of Visits Requested:   Griselda Adas - Roman Check, MD, Otolaryngology, [facility]     Referral Priority:   Routine     Referral Type:   Eval and Treat     Referral Reason:   Specialty Services Required     Referred to Provider:   Bakari Santana MD     Requested Specialty:   Otolaryngology     Number of Visits Requested:   1    Full PFT Study With Bronchodilator     Standing Status:   Future     Standing Expiration Date:   8/19/2020     No orders of the defined types were placed in this encounter. Discussed with patient the importance of exercise and weight control and  overall health and well-being. Reviewed with the patient: current clinical status, medications, activities and diet. Side effects, adverse effects of the medication prescribed today, as well as treatment plan and result expectations have been discussed with the patient who expresses understanding and desires to proceed. Return in about 3 months (around 10/20/2020).       Polina Villareal MD

## 2020-07-22 ENCOUNTER — HOSPITAL ENCOUNTER (OUTPATIENT)
Dept: GENERAL RADIOLOGY | Age: 85
Discharge: HOME | End: 2020-07-24
Payer: MEDICARE

## 2020-07-22 ENCOUNTER — HOSPITAL ENCOUNTER (OUTPATIENT)
Dept: ULTRASOUND IMAGING | Age: 85
Discharge: HOME | End: 2020-07-24
Payer: MEDICARE

## 2020-07-22 PROCEDURE — 73630 X-RAY EXAM OF FOOT: CPT

## 2020-07-22 PROCEDURE — 93971 EXTREMITY STUDY: CPT

## 2020-08-03 ENCOUNTER — HOSPITAL ENCOUNTER (OUTPATIENT)
Dept: PULMONOLOGY | Age: 85
Discharge: HOME | End: 2020-08-03
Payer: MEDICARE

## 2020-08-03 ENCOUNTER — HOSPITAL ENCOUNTER (OUTPATIENT)
Dept: CT IMAGING | Age: 85
Discharge: HOME | End: 2020-08-05
Payer: MEDICARE

## 2020-08-03 VITALS — HEIGHT: 61 IN | WEIGHT: 172 LBS | BODY MASS INDEX: 32.47 KG/M2

## 2020-08-03 PROCEDURE — 71250 CT THORAX DX C-: CPT

## 2020-08-03 PROCEDURE — 94729 DIFFUSING CAPACITY: CPT | Performed by: INTERNAL MEDICINE

## 2020-08-03 PROCEDURE — 94010 BREATHING CAPACITY TEST: CPT

## 2020-08-03 PROCEDURE — 94726 PLETHYSMOGRAPHY LUNG VOLUMES: CPT

## 2020-08-03 PROCEDURE — 94726 PLETHYSMOGRAPHY LUNG VOLUMES: CPT | Performed by: INTERNAL MEDICINE

## 2020-08-03 PROCEDURE — 94729 DIFFUSING CAPACITY: CPT

## 2020-08-03 PROCEDURE — 94060 EVALUATION OF WHEEZING: CPT | Performed by: INTERNAL MEDICINE

## 2020-08-07 ENCOUNTER — TELEPHONE (OUTPATIENT)
Dept: SURGERY | Age: 85
End: 2020-08-07

## 2020-08-07 NOTE — TELEPHONE ENCOUNTER
Follow-up CT is done, nodule persists, discussed with the patient again option for biopsy versus monitoring however the nodule has not showed any decrease in size she is agreeable to meet with Dr. Ana Laura Hoover and explore CT-guided biopsy option. I will see patient after that. Scott Restrepo could you please call patient and schedule her for follow-up in 4 weeks, she currently has appointment in October I would like to see her earlier mainly after her visit with Dr. Ana Laura Hoover to discuss.

## 2020-08-09 NOTE — PROCEDURES
[address]                      [address], [address] [facility]                               PULMONARY FUNCTION    PATIENT NAME: Marina Lowe                     :        1935  MED REC NO:   [id number]                            ROOM:  ACCOUNT NO:   [de-identified]                           ADMIT DATE: 2020  PROVIDER:     Camelia Carvalho MD    DATE OF PROCEDURE:  2020    REASON FOR STUDY:  Shortness of breath. INTERPRETATION:  FEC is 1.80, 83% of predicted. FEV1 is 1.52, 96% of  predicted. FEV1/FEC is 85%. FEF 25-75% is 1.50, 144% of predicted. Lung volume study shows residual volume is 2.15, 90% of predicted. TLC  is 3.90, 82% of predicted. Diffusion capacity is 12.16, 67% of  predicted. Airway resistance is 1.30, 69% of predicted. SUMMARY:  Normal spirometry. Static lung volume within normal range. Diffusion capacity is moderately impaired. Airway resistance is low. Flow volume loop is not diagnostic of obstructive or restricted  pulmonary disease. Clinical correlation is requested.         Merlinda Current, MD    D: 2020 15:05:38       T: 2020 16:47:49     [id number]  Job#: [id number]     Doc#: [id number]    CC:

## 2020-08-17 ENCOUNTER — OFFICE VISIT (OUTPATIENT)
Dept: CARDIOLOGY CLINIC | Age: 85
End: 2020-08-17
Payer: MEDICARE

## 2020-08-17 VITALS
HEART RATE: 74 BPM | OXYGEN SATURATION: 98 % | BODY MASS INDEX: 33.61 KG/M2 | WEIGHT: 178 LBS | RESPIRATION RATE: 18 BRPM | HEIGHT: 61 IN | DIASTOLIC BLOOD PRESSURE: 77 MMHG | SYSTOLIC BLOOD PRESSURE: 149 MMHG

## 2020-08-17 DIAGNOSIS — I50.22 CHRONIC SYSTOLIC CHF (CONGESTIVE HEART FAILURE), NYHA CLASS 1 (HCC): ICD-10-CM

## 2020-08-17 LAB
ANION GAP SERPL CALCULATED.3IONS-SCNC: 11 MEQ/L (ref 9–15)
BUN BLDV-MCNC: 26 MG/DL (ref 8–23)
CALCIUM SERPL-MCNC: 9.3 MG/DL (ref 8.5–9.9)
CHLORIDE BLD-SCNC: 107 MEQ/L (ref 95–107)
CO2: 23 MEQ/L (ref 20–31)
CREAT SERPL-MCNC: 0.98 MG/DL (ref 0.5–0.9)
GFR AFRICAN AMERICAN: >60
GFR NON-AFRICAN AMERICAN: 53.9
GLUCOSE BLD-MCNC: 145 MG/DL (ref 70–99)
POTASSIUM SERPL-SCNC: 4.1 MEQ/L (ref 3.4–4.9)
PRO-BNP: 902 PG/ML
SODIUM BLD-SCNC: 141 MEQ/L (ref 135–144)

## 2020-08-17 PROCEDURE — G8427 DOCREV CUR MEDS BY ELIG CLIN: HCPCS | Performed by: PHYSICIAN ASSISTANT

## 2020-08-17 PROCEDURE — 99214 OFFICE O/P EST MOD 30 MIN: CPT | Performed by: PHYSICIAN ASSISTANT

## 2020-08-17 PROCEDURE — 1090F PRES/ABSN URINE INCON ASSESS: CPT | Performed by: PHYSICIAN ASSISTANT

## 2020-08-17 PROCEDURE — 4040F PNEUMOC VAC/ADMIN/RCVD: CPT | Performed by: PHYSICIAN ASSISTANT

## 2020-08-17 PROCEDURE — 1123F ACP DISCUSS/DSCN MKR DOCD: CPT | Performed by: PHYSICIAN ASSISTANT

## 2020-08-17 PROCEDURE — 1036F TOBACCO NON-USER: CPT | Performed by: PHYSICIAN ASSISTANT

## 2020-08-17 PROCEDURE — G8417 CALC BMI ABV UP PARAM F/U: HCPCS | Performed by: PHYSICIAN ASSISTANT

## 2020-08-17 PROCEDURE — G8399 PT W/DXA RESULTS DOCUMENT: HCPCS | Performed by: PHYSICIAN ASSISTANT

## 2020-08-17 ASSESSMENT — ENCOUNTER SYMPTOMS
SHORTNESS OF BREATH: 1
COUGH: 0
BLOOD IN STOOL: 0
ABDOMINAL PAIN: 0
COLOR CHANGE: 0
NAUSEA: 0
VOMITING: 0
CHEST TIGHTNESS: 0

## 2020-08-17 NOTE — PROGRESS NOTES
Patient: Placido Ragland  YOB: 1935  MRN: [id number]    Chief Complaint:  Chief Complaint   Patient presents with    Congestive Heart Failure     systolic         Subjective/HPI        8/17/20: Here for follow-up of systolic congestive heart failure and nonischemic cardiomyopathy. Was last seen in office on 7/9/2020 and since that time is been doing very well overall. She denies any new or worsening CHF symptoms. She is compliant with all of her medications. She does report taking Lopressor only 50 mg once a day instead of twice daily and has been advised to go home and clarify exact medication in frequency is listed on medication bottle and call us back. Daughter accompanies her in office today. She denies any complaint of chest pain, shortness of breath or significant dyspnea on exertion, nausea, vomiting, dizziness, lightheadedness, diaphoresis, palpitations, paroxysmal nocturnal dyspnea, orthopnea, worsening lower extremity edema, syncope, fever or chills. She is on oral anticoagulation with Xarelto and denies any bleeding issues. She is adherent to low-sodium diet and fluid restriction but states that she does eat a significant amount of cheese which she has been cautioned to limit. Most recent labs reviewed and documented below. BMP 7/9/2020: Sodium 143, potassium 4.1, chloride 103, total CO2 30, BUN elevated 24, creatinine elevated 1.16, GFR low at 44.4, glucose 91  proBNP 7/9/2020: 2004 compared to as high as 4654 on 6/24/2020    Vital signs stable in office today. Blood pressure 149/77, heart rate 74, pulse ox 98% on room air. Weight is 178 pounds compared to 175 pounds at last visit on 7/9/2020. Past with patient and daughter regarding repeat echocardiogram to reevaluate LV function and mitral regurgitation. This will be scheduled no sooner than September 26, 2020 which is approx 3 months from her prior echo.       7/9/20 CHF clinic visit #1: This is a very pleasant 80-year-old  female who presents for initial CHF clinic evaluation following recent hospital admission for new onset A. fib RVR and new onset congestive heart failure. Echocardiogram completed during hospital admission revealed reduced LV systolic function with ejection fraction of 35 to 40% and 3-4+ MR with RVSP of 60 mmHg. She was initiated on IV diuresis with Lasix as well anticoagulation with full dose Lovenox and optimized on rate control and CHF medications. Given new onset congestive heart failure, mild troponin elevation, new onset A. fib and cardiomyopathy patient underwent cardiac catheterization on 6/26/2020 to rule out underlying cardiac ischemia. Cardiac catheterization revealed normal coronary arteries with EF of 45%. Also, patient underwent BEBE on 6/26/2020 which revealed 2+ MR, RVSP 48 mmHg and EF of 40%. Patient had initially converted back to sinus rhythm after initiation of IV Cardizem in the emergency room however she converted back to A. fib RVR during admission. Rate control medications were titrated and on 6/27/2020 she had converted back to sinus rhythm. Asymptomatically improved during the course of her admission. She was transitioned to oral anticoagulation with Xarelto at discharge. She was discharged home in stable condition on 6/27/2020. Since discharge, patient states that she has been doing very well overall and has had continued improvement in her shortness of breath. She will still experience shortness of breath with moderate exertion such as when walking up a flight of steps but otherwise is able to carry out her normal activities without any trouble breathing. She admits to being sedentary recently not feeling like doing many of the normal activities that she used to. Denies chest pain, palpitations, diaphoresis, paroxysmal nocturnal dyspnea, orthopnea, lower extremity edema, syncope, cough, fever or chills.   Denies any difficulty urinating or any painful urination. She denies any bleeding issues on Xarelto including hematochezia or melena. He is adherent to a low-sodium diet as well as fluid restriction. She has been weighing herself on a daily basis and weight at home this morning was 175 pounds compared to 182 pounds on day of discharge from the hospital.    EKG in office today shows sinus rhythm with ventricular rate of 63 bpm, ST depression in leads I and aVL and poor R wave progression in V2 through V6,  ms. Recent labs reviewed and documented below. BMP 6/27/2020: Sodium 142, potassium 3.8, chloride 106, total CO2 24, BUN 12, creatinine 1.11, GFR 46.7, glucose 120  CBC 6/26/2020: WBC 10.3, hemoglobin 14.9, hematocrit 44.8, platelets 656  proBNP 6/24/2020: 4654  TSH 6/24/2020: 1.40      Vital signs stable in office today with blood pressure 118/82, heart rate 64, pulse ox 98% on room air. Weight is 175 pounds      Echocardiogram 6/24/20:  Conclusions   Summary   Left ventricular ejection fraction is visually estimated at 35-40%. global   hypokinesis. difficult to estimate due to afib. Left ventricular size is normal .   Pseudonormal filling pattern noted. Moderately severe (3-4+) mitral regurgitation is present. No evidence of mitral valve stenosis. No evidence of aortic valve regurgitation . No evidence of aortic valve stenosis. There is severe tricuspid regurgitation with estimated RVSP of 60 mm Hg. Right ventricular systolic pressure of 60 mm Hg consistent with moderate   pulmonary hypertension. The left atrium is Moderately dilated. Signature   ----------------------------------------------------------------   Electronically signed by Tony Izquierdo DO(Interpreting   physician) on 06/25/2020 05:56 PM    [facility] 6/26/20:  Conclusions  Procedure Summary  normal coronaries  normal LVF  PCWP 20mmHg  mod PHTN  normal C. I and C.O  no shunts. Recommendations  Aggressive risk factor management. Maximize medical therapy. Angiographic Findings   Cardiac Arteries and Lesion Findings  LMCA: Normal.  LAD: small caliber, tapers distally. LCx: Normal.  RCA: Normal.  Dominance: Right  LV function assessed as:Abnormal.  Ejection Fraction    - Method: LV gram. EF%: 45. BEBE 20:  Conclusions   Summary   Left ventricular ejection fraction is visually estimated at 40%. Left ventricular size is mildly increased . Moderate (2+) mitral regurgitation is present. No evidence of aortic valve regurgitation . There is mild tricuspid regurgitation with estimated RVSP of 48 mm Hg. No evidence of left to right shunt with color flow doppler or right to   left shunt with agitated saline contrast study. Moderately dilated LA. Recommendation   [facility] to follow. Signature   ----------------------------------------------------------------   Electronically signed by Tariq Gray DO(Interpreting   physician) on 2020 09:26 AM      PMHx:  Newly diagnosed A-fib  Normal coronaries per [facility] 20  Moderate 2+ MR  Systolic CHF     PSHx:   x 3  Tonsillectomy  Hysterectomy    Allergies:  None    Social Hx:  No smoking  Social drinker       No Known Allergies    Current Outpatient Medications   Medication Sig Dispense Refill    atorvastatin (LIPITOR) 20 MG tablet Take 1 tablet by mouth nightly 30 tablet 3    rivaroxaban (XARELTO) 20 MG TABS tablet Take 1 tablet by mouth daily 30 tablet 3    metoprolol tartrate (LOPRESSOR) 50 MG tablet Take 1 tablet by mouth 2 times daily (Patient taking differently: Take 50 mg by mouth daily ) 60 tablet 3    sacubitril-valsartan (ENTRESTO) 24-26 MG per tablet Take 1 tablet by mouth 2 times daily 60 tablet 3    furosemide (LASIX) 20 MG tablet Take 1 tablet by mouth daily 60 tablet 3    dilTIAZem (CARDIZEM CD) 120 MG extended release capsule Take 1 capsule by mouth daily 30 capsule 3    acetaminophen (TYLENOL) 325 MG tablet Take 650 mg by mouth every 6 hours as needed.         oxybutynin (DITROPAN) 5 MG tablet Take 1 tablet by mouth 3 times daily. (Patient taking differently: Take 5 mg by mouth 2 times daily ) 270 tablet 1    diphenhydrAMINE (BENADRYL) 25 MG tablet Take 25 mg by mouth nightly as needed. For sleep       aspirin 81 MG EC tablet Take 81 mg by mouth daily.  multivitamin (ANTIOXIDANT;PROSIGHT) TABS per tablet Take 1 tablet by mouth daily. No current facility-administered medications for this visit.         Past Medical History:   Diagnosis Date    Atrial fibrillation (HCC)     Depression     Elevated glucose     HTN (hypertension)     Hyperlipidemia     Osteopenia     Hips    Rheumatic heart disease     as a child    Rib fractures     Secondary to AA       Past Surgical History:   Procedure Laterality Date    APPENDECTOMY       SECTION      COLONOSCOPY      Internal hemorrhoids    COLONOSCOPY      normal    EYE SURGERY      HYSTERECTOMY         Social History     Socioeconomic History    Marital status:      Spouse name: None    Number of children: None    Years of education: None    Highest education level: None   Occupational History    None   Social Needs    Financial resource strain: None    Food insecurity     Worry: None     Inability: None    Transportation needs     Medical: None     Non-medical: None   Tobacco Use    Smoking status: Never Smoker    Smokeless tobacco: Never Used   Substance and Sexual Activity    Alcohol use: No    Drug use: Never    Sexual activity: Not Currently     Partners: Male   Lifestyle    Physical activity     Days per week: None     Minutes per session: None    Stress: None   Relationships    Social connections     Talks on phone: None     Gets together: None     Attends Holiness service: None     Active member of club or organization: None     Attends meetings of clubs or organizations: None     Relationship status: None    Intimate partner violence     Fear of current or ex partner: None     Emotionally abused: None     Physically abused: None     Forced sexual activity: None   Other Topics Concern    None   Social History Narrative    None       Family History   Problem Relation Age of Onset    Heart Disease Mother         Valvular heart disease from childhood rheumatic fever    Diabetes Maternal Aunt          Review of Systems:   Review of Systems   Constitutional: Negative for activity change, fatigue and fever. HENT: Negative for congestion. Respiratory: Positive for shortness of breath (occasional with moderate exertion). Negative for cough and chest tightness. Cardiovascular: Negative for chest pain, palpitations and leg swelling. Gastrointestinal: Negative for abdominal pain, blood in stool, nausea and vomiting. Genitourinary: Negative for difficulty urinating, dysuria and hematuria. Musculoskeletal: Negative for arthralgias and myalgias. Skin: Negative for color change, pallor and rash. Neurological: Negative for dizziness, syncope and light-headedness. Psychiatric/Behavioral: Negative for agitation and behavioral problems. Physical Examination:    BP (!) 149/77 (Site: Left Upper Arm, Position: Sitting, Cuff Size: Large Adult)   Pulse 74   Resp 18   Ht 5' 1\" (1.549 m)   Wt 178 lb (80.7 kg)   SpO2 98%   BMI 33.63 kg/m²    Physical Exam  Constitutional:       General: She is not in acute distress. Appearance: Normal appearance. HENT:      Head: Normocephalic and atraumatic. Neck:      Musculoskeletal: Normal range of motion and neck supple. Vascular: No carotid bruit. Cardiovascular:      Rate and Rhythm: Normal rate and regular rhythm. Heart sounds: No murmur. Pulmonary:      Effort: Pulmonary effort is normal. No respiratory distress. Breath sounds: Normal breath sounds. No wheezing, rhonchi or rales. Abdominal:      Palpations: Abdomen is soft. Tenderness: There is no abdominal tenderness. Musculoskeletal: Normal range of motion. Right lower leg: No edema. Left lower leg: No edema. Skin:     General: Skin is warm and dry. Neurological:      General: No focal deficit present. Mental Status: She is alert and oriented to person, place, and time. Cranial Nerves: No cranial nerve deficit.    Psychiatric:         Mood and Affect: Mood normal.         Behavior: Behavior normal.         LABS:  CBC:   Lab Results   Component Value Date    WBC 9.1 06/27/2020    RBC 4.15 06/27/2020    HGB 13.6 06/27/2020    HCT 40.4 06/27/2020    MCV 97.2 06/27/2020    MCH 32.6 06/27/2020    MCHC 33.6 06/27/2020    RDW 13.4 06/27/2020     06/27/2020    MPV 9.7 07/09/2014     Lipids:  Lab Results   Component Value Date    CHOL 207 (H) 07/09/2014    CHOL 192 05/25/2012     Lab Results   Component Value Date    TRIG 101 07/09/2014    TRIG 117 05/25/2012     Lab Results   Component Value Date    HDL 69 (H) 07/09/2014    HDL 51 05/25/2012     Lab Results   Component Value Date    LDLCALC 118 07/09/2014    LDLCALC 122 05/25/2012     No results found for: LABVLDL, VLDL  Lab Results   Component Value Date    CHOLHDLRATIO 3.8 05/25/2012     CMP:    Lab Results   Component Value Date     07/09/2020    K 4.1 07/09/2020     07/09/2020    CO2 30 07/09/2020    BUN 24 07/09/2020    CREATININE 1.16 07/09/2020    GFRAA 53.7 07/09/2020    LABGLOM 44.4 07/09/2020    GLUCOSE 91 07/09/2020    GLUCOSE 123 05/25/2012    PROT 6.9 06/24/2020    LABALBU 3.6 06/24/2020    LABALBU 4.2 05/25/2012    CALCIUM 9.8 07/09/2020    BILITOT 0.3 06/24/2020    ALKPHOS 50 06/24/2020    AST 23 06/24/2020    ALT 41 06/24/2020     BMP:    Lab Results   Component Value Date     07/09/2020    K 4.1 07/09/2020     07/09/2020    CO2 30 07/09/2020    BUN 24 07/09/2020    LABALBU 3.6 06/24/2020    LABALBU 4.2 05/25/2012    CREATININE 1.16 07/09/2020    CALCIUM 9.8 07/09/2020    GFRAA 53.7 07/09/2020    LABGLOM 44.4 07/09/2020    GLUCOSE 91 07/09/2020    GLUCOSE 123 05/25/2012     Magnesium:    Lab Results   Component Value Date    MG 2.1 06/24/2020     TSH:  Lab Results   Component Value Date    TSH 1.400 06/24/2020     . result  No results for input(s): PROBNP in the last 72 hours. No results for input(s): INR in the last 72 hours. Patient Active Problem List   Diagnosis    HTN (hypertension)    Elevated glucose    Hyperlipidemia    Osteopenia    A-fib ([facility]    Lung nodule       Assessment/Plan:     Diagnosis Orders   1. Chronic systolic CHF (congestive heart failure), NYHA class 1 ([facility])  Basic Metabolic Panel    Brain Natriuretic Peptide    compensated   2. Paroxysmal atrial fibrillation ([facility]      on [address] with xarelto   3. Normal coronary arteries      per [facility] 6/26/20   4. Nonischemic cardiomyopathy ([facility]  Echo 2D w doppler w color complete   5. Moderate mitral regurgitation      3-4+ MR per echo 6/24/20 and BEBE 6/26/20 with 2+ MR   6. Pulmonary HTN ([facility])      RVSP 48mmHg per BEBE 6/26/20   7. Renal insufficiency      stable when last check 7/9/20       -Maximize medical therapy--aspirin 81 mg p.o. daily, Lopressor 50 mg p.o. twice daily, Cardizem  mg p.o. daily, Entresto 24/26 mg p.o. twice daily, Lasix 20 mg p.o. daily, Lipitor 20 mg tablet p.o. daily at bedtime, Xarelto 20 mg p.o. daily  -Heart Failure appears compensated at this time. No need for further medication adjustments  -Will consider decreasing Lasix to every other day if worsening renal function noted on repeat labs given patient's complaint of frequent urination when taking Lasix  -Check BMP today to compare to BMP from 7/9/2020  -Repeat BNP today  -Cardiac/<2 gram sodium diet recommended  -1800-2000mL fluid restriction   -Instructed patient to weigh self daily every morning upon waking and keep log book of daily weights. Notify office if gaining more than 3 pounds in 48 hours.   -Advised patient to notify office immediately if experiencing any progressive SOB, orthopnea, PND, LE edema or weight gain  -Educated patient on importance of fluid and salt restriction as well as lifestyle modification  -EKG in office 7/9/20 showing patient to be maintaining sinus rhythm  -Will order repeat limited echocardiogram to reevaluate LV function and mitral regurgitation to be completed no sooner than 9/26/2020 which is approximately 3 months from initial echocardiogram.  -May need to consider Holter/event monitor in future for further evaluation of paroxysmal atrial fibrillation  -Discussed again with patient regarding possible referral to cardiac rehab however she continues to decline as she prefers to avoid contact with too many individuals in light of COVID-19 pandemic. She has been educated on the importance of routine exercise at home such as walking outside for 30 minutes/day if possible  -Maintain routine outpatient follow-up with general cardiologist, Dr. Meena Bethea appointment October 2020  -Maintain routine outpatient follow-up with PCP  -F/U with CHF clinic in 3 months or sooner if needed        Counseling: The importance of daily weights, dietary sodium restriction, and contact with cardiology if weight is increased more than 3 lbs in any 48 hour period was stressed. The patient has been advised to contact us if theyexperience progressive SOB, orthopnea, PND or progressive edema.

## 2020-08-19 ENCOUNTER — TELEPHONE (OUTPATIENT)
Dept: INTERVENTIONAL RADIOLOGY/VASCULAR | Age: 85
End: 2020-08-19

## 2020-08-19 RX ORDER — DILTIAZEM HYDROCHLORIDE 120 MG/1
120 CAPSULE, COATED, EXTENDED RELEASE ORAL DAILY
Qty: 90 CAPSULE | Refills: 3 | Status: SHIPPED | OUTPATIENT
Start: 2020-08-19 | End: 2020-10-26

## 2020-08-19 RX ORDER — FUROSEMIDE 20 MG/1
20 TABLET ORAL DAILY
Qty: 180 TABLET | Refills: 3 | Status: SHIPPED | OUTPATIENT
Start: 2020-08-19

## 2020-08-19 RX ORDER — ATORVASTATIN CALCIUM 20 MG/1
20 TABLET, FILM COATED ORAL NIGHTLY
Qty: 90 TABLET | Refills: 3 | Status: SHIPPED | OUTPATIENT
Start: 2020-08-19 | End: 2021-09-22

## 2020-08-19 RX ORDER — METOPROLOL TARTRATE 50 MG/1
50 TABLET, FILM COATED ORAL 2 TIMES DAILY
Qty: 180 TABLET | Refills: 3 | Status: SHIPPED | OUTPATIENT
Start: 2020-08-19 | End: 2020-10-28 | Stop reason: SDUPTHER

## 2020-08-19 NOTE — TELEPHONE ENCOUNTER
PATIENT LEFT APPOINTMENT BEFORE BEING SEEN YESTERDAY - I TRIED TO MAKE HER ANOTHER APPOINTMENT BUT SHE DID NOT FEEL IT WAS NECESSARY BECAUSE SHE THOUGHT APPOINTMENT WAS TO DO WITH HER FOOT.     I CALLED HER TODAY TO GET HER CONSULT APPOINTMENT R/S --- SHE STATES IT IS NOT NECESSARY TO HAVE CONSULT FOR LUNG BIOPSY

## 2020-09-15 ENCOUNTER — OFFICE VISIT (OUTPATIENT)
Dept: PULMONOLOGY | Age: 85
End: 2020-09-15
Payer: MEDICARE

## 2020-09-15 VITALS
DIASTOLIC BLOOD PRESSURE: 78 MMHG | HEART RATE: 73 BPM | WEIGHT: 178.2 LBS | OXYGEN SATURATION: 98 % | BODY MASS INDEX: 33.64 KG/M2 | HEIGHT: 61 IN | TEMPERATURE: 97.2 F | RESPIRATION RATE: 15 BRPM | SYSTOLIC BLOOD PRESSURE: 114 MMHG

## 2020-09-15 PROCEDURE — G8417 CALC BMI ABV UP PARAM F/U: HCPCS | Performed by: INTERNAL MEDICINE

## 2020-09-15 PROCEDURE — 4040F PNEUMOC VAC/ADMIN/RCVD: CPT | Performed by: INTERNAL MEDICINE

## 2020-09-15 PROCEDURE — 99214 OFFICE O/P EST MOD 30 MIN: CPT | Performed by: INTERNAL MEDICINE

## 2020-09-15 PROCEDURE — 1090F PRES/ABSN URINE INCON ASSESS: CPT | Performed by: INTERNAL MEDICINE

## 2020-09-15 PROCEDURE — 1123F ACP DISCUSS/DSCN MKR DOCD: CPT | Performed by: INTERNAL MEDICINE

## 2020-09-15 PROCEDURE — 1036F TOBACCO NON-USER: CPT | Performed by: INTERNAL MEDICINE

## 2020-09-15 PROCEDURE — G8399 PT W/DXA RESULTS DOCUMENT: HCPCS | Performed by: INTERNAL MEDICINE

## 2020-09-15 PROCEDURE — G8427 DOCREV CUR MEDS BY ELIG CLIN: HCPCS | Performed by: INTERNAL MEDICINE

## 2020-09-21 ENCOUNTER — CLINICAL DOCUMENTATION (OUTPATIENT)
Dept: CARDIOLOGY CLINIC | Age: 85
End: 2020-09-21

## 2020-09-21 NOTE — PROGRESS NOTES
Information regarding your request   This medication or product is on your plan's list of covered drugs. Prior authorization is not required at this time. If your pharmacy has questions regarding the processing of your prescription, please have them call the [employer] pharmacy help desk at [phone]. **Please note: Formulary lowering, tiering exception, cost reduction and/or pre-benefit determination review (including prospective Medicare hospice reviews) requests cannot be requested using this method of submission. Please contact us at [phone] instead.

## 2020-10-01 ENCOUNTER — INITIAL CONSULT (OUTPATIENT)
Dept: INTERVENTIONAL RADIOLOGY/VASCULAR | Age: 85
End: 2020-10-01
Payer: MEDICARE

## 2020-10-01 VITALS
WEIGHT: 178 LBS | BODY MASS INDEX: 33.63 KG/M2 | HEART RATE: 73 BPM | DIASTOLIC BLOOD PRESSURE: 78 MMHG | SYSTOLIC BLOOD PRESSURE: 120 MMHG

## 2020-10-01 PROCEDURE — G8484 FLU IMMUNIZE NO ADMIN: HCPCS | Performed by: NURSE PRACTITIONER

## 2020-10-01 PROCEDURE — G8417 CALC BMI ABV UP PARAM F/U: HCPCS | Performed by: NURSE PRACTITIONER

## 2020-10-01 PROCEDURE — G8428 CUR MEDS NOT DOCUMENT: HCPCS | Performed by: NURSE PRACTITIONER

## 2020-10-01 PROCEDURE — 1090F PRES/ABSN URINE INCON ASSESS: CPT | Performed by: NURSE PRACTITIONER

## 2020-10-01 PROCEDURE — 99203 OFFICE O/P NEW LOW 30 MIN: CPT | Performed by: NURSE PRACTITIONER

## 2020-10-01 ASSESSMENT — ENCOUNTER SYMPTOMS
DIARRHEA: 0
VOMITING: 0
SHORTNESS OF BREATH: 0
COUGH: 0
EYE PAIN: 0
SINUS PRESSURE: 0
EYE ITCHING: 0
SINUS PAIN: 0
EYE DISCHARGE: 0
EYE REDNESS: 0
NAUSEA: 0
WHEEZING: 0
EYES NEGATIVE: 1
BACK PAIN: 0
TROUBLE SWALLOWING: 0
ABDOMINAL PAIN: 0
CONSTIPATION: 0
SORE THROAT: 0
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
## 2020-10-02 ENCOUNTER — HOSPITAL ENCOUNTER (OUTPATIENT)
Dept: NON INVASIVE DIAGNOSTICS | Age: 85
Discharge: HOME | End: 2020-10-02
Payer: MEDICARE

## 2020-10-02 LAB
LV EF: 50 %
LVEF MODALITY: NORMAL

## 2020-10-02 PROCEDURE — C8929 TTE W OR WO FOL WCON,DOPPLER: HCPCS

## 2020-10-02 PROCEDURE — 93320 DOPPLER ECHO COMPLETE: CPT

## 2020-10-02 PROCEDURE — 93325 DOPPLER ECHO COLOR FLOW MAPG: CPT

## 2020-10-02 PROCEDURE — 93308 TTE F-UP OR LMTD: CPT

## 2020-10-13 ENCOUNTER — OFFICE VISIT (OUTPATIENT)
Dept: CARDIOLOGY CLINIC | Age: 85
End: 2020-10-13
Payer: MEDICARE

## 2020-10-13 VITALS
HEART RATE: 62 BPM | TEMPERATURE: 97.2 F | WEIGHT: 183 LBS | SYSTOLIC BLOOD PRESSURE: 130 MMHG | DIASTOLIC BLOOD PRESSURE: 80 MMHG | BODY MASS INDEX: 34.58 KG/M2

## 2020-10-13 PROBLEM — I42.8 CARDIOMYOPATHY, NONISCHEMIC (HCC): Status: ACTIVE | Noted: 2020-10-13

## 2020-10-13 PROBLEM — I27.20 PULMONARY HYPERTENSION (HCC): Status: ACTIVE | Noted: 2020-10-13

## 2020-10-13 PROCEDURE — G8484 FLU IMMUNIZE NO ADMIN: HCPCS | Performed by: INTERNAL MEDICINE

## 2020-10-13 PROCEDURE — 1090F PRES/ABSN URINE INCON ASSESS: CPT | Performed by: INTERNAL MEDICINE

## 2020-10-13 PROCEDURE — G8427 DOCREV CUR MEDS BY ELIG CLIN: HCPCS | Performed by: INTERNAL MEDICINE

## 2020-10-13 PROCEDURE — G8417 CALC BMI ABV UP PARAM F/U: HCPCS | Performed by: INTERNAL MEDICINE

## 2020-10-13 PROCEDURE — 4040F PNEUMOC VAC/ADMIN/RCVD: CPT | Performed by: INTERNAL MEDICINE

## 2020-10-13 PROCEDURE — 93000 ELECTROCARDIOGRAM COMPLETE: CPT | Performed by: INTERNAL MEDICINE

## 2020-10-13 PROCEDURE — 1123F ACP DISCUSS/DSCN MKR DOCD: CPT | Performed by: INTERNAL MEDICINE

## 2020-10-13 PROCEDURE — 99214 OFFICE O/P EST MOD 30 MIN: CPT | Performed by: INTERNAL MEDICINE

## 2020-10-13 PROCEDURE — G8399 PT W/DXA RESULTS DOCUMENT: HCPCS | Performed by: INTERNAL MEDICINE

## 2020-10-13 PROCEDURE — 1036F TOBACCO NON-USER: CPT | Performed by: INTERNAL MEDICINE

## 2020-10-13 ASSESSMENT — ENCOUNTER SYMPTOMS
EYES NEGATIVE: 1
GASTROINTESTINAL NEGATIVE: 1
ALLERGIC/IMMUNOLOGIC NEGATIVE: 1
WHEEZING: 0
VOMITING: 0
SHORTNESS OF BREATH: 1
ABDOMINAL PAIN: 0
NAUSEA: 0
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
## 2020-10-14 ENCOUNTER — PREP FOR PROCEDURE (OUTPATIENT)
Dept: INTERVENTIONAL RADIOLOGY/VASCULAR | Age: 85
End: 2020-10-14

## 2020-10-14 RX ORDER — FENTANYL CITRATE 50 UG/ML
50 INJECTION, SOLUTION INTRAMUSCULAR; INTRAVENOUS
Status: CANCELLED | OUTPATIENT
Start: 2020-10-14

## 2020-10-14 RX ORDER — IBUPROFEN 200 MG
TABLET ORAL ONCE
Status: CANCELLED | OUTPATIENT
Start: 2020-10-14 | End: 2020-10-14

## 2020-10-14 RX ORDER — 0.9 % SODIUM CHLORIDE 0.9 %
250 INTRAVENOUS SOLUTION INTRAVENOUS
Status: CANCELLED | OUTPATIENT
Start: 2020-10-14

## 2020-10-14 RX ORDER — MIDAZOLAM HYDROCHLORIDE 1 MG/ML
0.5 INJECTION INTRAMUSCULAR; INTRAVENOUS
Status: CANCELLED | OUTPATIENT
Start: 2020-10-14

## 2020-10-14 RX ORDER — LIDOCAINE HYDROCHLORIDE 20 MG/ML
10 INJECTION, SOLUTION INFILTRATION; PERINEURAL
Status: CANCELLED | OUTPATIENT
Start: 2020-10-14

## 2020-10-19 ENCOUNTER — NURSE ONLY (OUTPATIENT)
Dept: PRIMARY CARE CLINIC | Age: 85
End: 2020-10-19

## 2020-10-19 ENCOUNTER — HOSPITAL ENCOUNTER (OUTPATIENT)
Age: 85
Setting detail: SPECIMEN
Discharge: HOME | End: 2020-10-19
Payer: MEDICARE

## 2020-10-19 PROCEDURE — U0003 INFECTIOUS AGENT DETECTION BY NUCLEIC ACID (DNA OR RNA); SEVERE ACUTE RESPIRATORY SYNDROME CORONAVIRUS 2 (SARS-COV-2) (CORONAVIRUS DISEASE [COVID-19]), AMPLIFIED PROBE TECHNIQUE, MAKING USE OF HIGH THROUGHPUT TECHNOLOGIES AS DESCRIBED BY CMS-2020-01-R: HCPCS

## 2020-10-20 ENCOUNTER — TELEPHONE (OUTPATIENT)
Dept: INTERVENTIONAL RADIOLOGY/VASCULAR | Age: 85
End: 2020-10-20

## 2020-10-20 LAB
SARS-COV-2: NOT DETECTED
SOURCE: NORMAL

## 2020-10-20 NOTE — TELEPHONE ENCOUNTER
PATIENT WAS GIVEN THIS INFORMATION VIA PHONE CONVERSATION - VOICED UNDERSTANDING  >  YOUR PROCEDURE IS SCHEDULED ON: 10/26/20 @ 9:00 AM  >  You will need to arrive at 8:00 AM from home and check in at the Welcome Desk located through the Outpatient Entrance before your scheduled time of the procedure.   >  Do not eat or drink after midnight. Sips of water is acceptable ONLY to take medications but do not take blood thinners or other medications you were advised not to take.  >  Make arrangements for transportation, as you should not drive immediately after.  > You need to call [phone], option 2, to pre-register for the procedure the day before it is scheduled.   > Patient to hold Xarelto for 2 days prior to procedure (starting 10/24/20)

## 2020-10-23 ENCOUNTER — PREP FOR PROCEDURE (OUTPATIENT)
Dept: INTERVENTIONAL RADIOLOGY/VASCULAR | Age: 85
End: 2020-10-23

## 2020-10-23 RX ORDER — MIDAZOLAM HYDROCHLORIDE 1 MG/ML
0.5 INJECTION INTRAMUSCULAR; INTRAVENOUS
Status: CANCELLED | OUTPATIENT
Start: 2020-10-23

## 2020-10-23 RX ORDER — 0.9 % SODIUM CHLORIDE 0.9 %
250 INTRAVENOUS SOLUTION INTRAVENOUS
Status: CANCELLED | OUTPATIENT
Start: 2020-10-23

## 2020-10-23 RX ORDER — IBUPROFEN 200 MG
TABLET ORAL ONCE
Status: CANCELLED | OUTPATIENT
Start: 2020-10-23 | End: 2020-10-23

## 2020-10-23 RX ORDER — FENTANYL CITRATE 50 UG/ML
50 INJECTION, SOLUTION INTRAMUSCULAR; INTRAVENOUS
Status: CANCELLED | OUTPATIENT
Start: 2020-10-23

## 2020-10-23 RX ORDER — LIDOCAINE HYDROCHLORIDE 20 MG/ML
10 INJECTION, SOLUTION INFILTRATION; PERINEURAL
Status: CANCELLED | OUTPATIENT
Start: 2020-10-23

## 2020-10-25 ENCOUNTER — PRE-PROCEDURE TELEPHONE (OUTPATIENT)
Dept: CT IMAGING | Age: 85
End: 2020-10-25

## 2020-10-26 ENCOUNTER — TELEPHONE (OUTPATIENT)
Dept: INTERVENTIONAL RADIOLOGY/VASCULAR | Age: 85
End: 2020-10-26

## 2020-10-28 RX ORDER — METOPROLOL TARTRATE 50 MG/1
50 TABLET, FILM COATED ORAL 2 TIMES DAILY
Qty: 180 TABLET | Refills: 3 | Status: SHIPPED | OUTPATIENT
Start: 2020-10-28 | End: 2021-08-02 | Stop reason: SDUPTHER

## 2020-10-28 NOTE — TELEPHONE ENCOUNTER
requesting medication refill.  Please approve or deny this request.    Rx requested:  Requested Prescriptions     Pending Prescriptions Disp Refills    metoprolol tartrate (LOPRESSOR) 50 MG tablet 180 tablet 3     Sig: Take 1 tablet by mouth 2 times daily         Last Office Visit:   10/13/2020      Next Visit Date:  Future Appointments   Date Time Provider Yuridia López   11/2/2020  9:00 AM RENZO CT ROOM 1 [facility]   11/16/2020  9:30 AM NANY Garcia AP[facility]   11/17/2020  1:45 PM [facility], MD  [address]   2/16/2021  9:15 AM David Sanchez, DO [address] [place]

## 2020-10-30 RX ORDER — DILTIAZEM HYDROCHLORIDE 120 MG/1
120 CAPSULE, COATED, EXTENDED RELEASE ORAL DAILY
Qty: 30 CAPSULE | Refills: 5 | Status: SHIPPED | OUTPATIENT
Start: 2020-10-30 | End: 2020-11-11 | Stop reason: SDUPTHER

## 2020-10-30 RX ORDER — METOPROLOL TARTRATE 50 MG/1
TABLET, FILM COATED ORAL
Qty: 60 TABLET | Refills: 5 | Status: SHIPPED | OUTPATIENT
Start: 2020-10-30 | End: 2020-11-11 | Stop reason: SDUPTHER

## 2020-10-30 RX ORDER — RIVAROXABAN 20 MG/1
TABLET, FILM COATED ORAL
Qty: 30 TABLET | Refills: 5 | Status: SHIPPED | OUTPATIENT
Start: 2020-10-30 | End: 2020-11-11 | Stop reason: SDUPTHER

## 2020-11-02 ENCOUNTER — HOSPITAL ENCOUNTER (OUTPATIENT)
Dept: GENERAL RADIOLOGY | Age: 85
Discharge: HOME | End: 2020-11-04
Payer: MEDICARE

## 2020-11-02 ENCOUNTER — HOSPITAL ENCOUNTER (OUTPATIENT)
Dept: CT IMAGING | Age: 85
Discharge: HOME | End: 2020-11-04
Payer: MEDICARE

## 2020-11-02 VITALS
OXYGEN SATURATION: 92 % | RESPIRATION RATE: 16 BRPM | SYSTOLIC BLOOD PRESSURE: 186 MMHG | DIASTOLIC BLOOD PRESSURE: 76 MMHG | HEART RATE: 54 BPM

## 2020-11-02 PROCEDURE — 32405 CT NEEDLE BIOPSY LUNG PERCUTANEOUS: CPT | Performed by: RADIOLOGY

## 2020-11-02 PROCEDURE — 88305 TISSUE EXAM BY PATHOLOGIST: CPT

## 2020-11-02 PROCEDURE — 71046 X-RAY EXAM CHEST 2 VIEWS: CPT

## 2020-11-02 PROCEDURE — 32405 CT NEEDLE BIOPSY LUNG PERCUTANEOUS: CPT

## 2020-11-02 PROCEDURE — 2580000003 HC RX 258: Performed by: NURSE PRACTITIONER

## 2020-11-02 PROCEDURE — 77012 CT SCAN FOR NEEDLE BIOPSY: CPT

## 2020-11-02 PROCEDURE — 77012 CT SCAN FOR NEEDLE BIOPSY: CPT | Performed by: RADIOLOGY

## 2020-11-02 PROCEDURE — 6370000000 HC RX 637 (ALT 250 FOR IP): Performed by: RADIOLOGY

## 2020-11-02 PROCEDURE — 2500000003 HC RX 250 WO HCPCS: Performed by: RADIOLOGY

## 2020-11-02 PROCEDURE — 6360000002 HC RX W HCPCS: Performed by: RADIOLOGY

## 2020-11-02 PROCEDURE — 71046 X-RAY EXAM CHEST 2 VIEWS: CPT | Performed by: RADIOLOGY

## 2020-11-02 RX ORDER — FENTANYL CITRATE 50 UG/ML
INJECTION, SOLUTION INTRAMUSCULAR; INTRAVENOUS
Status: COMPLETED | OUTPATIENT
Start: 2020-11-02 | End: 2020-11-02

## 2020-11-02 RX ORDER — 0.9 % SODIUM CHLORIDE 0.9 %
250 INTRAVENOUS SOLUTION INTRAVENOUS
Status: DISCONTINUED | OUTPATIENT
Start: 2020-11-02 | End: 2020-11-05 | Stop reason: HOSPADM

## 2020-11-02 RX ORDER — MIDAZOLAM HYDROCHLORIDE 1 MG/ML
0.5 INJECTION INTRAMUSCULAR; INTRAVENOUS
Status: DISCONTINUED | OUTPATIENT
Start: 2020-11-02 | End: 2020-11-05 | Stop reason: HOSPADM

## 2020-11-02 RX ORDER — LIDOCAINE HYDROCHLORIDE 20 MG/ML
10 INJECTION, SOLUTION INFILTRATION; PERINEURAL
Status: DISCONTINUED | OUTPATIENT
Start: 2020-11-02 | End: 2020-11-05 | Stop reason: HOSPADM

## 2020-11-02 RX ORDER — BACITRACIN, NEOMYCIN, POLYMYXIN B 400; 3.5; 5 [USP'U]/G; MG/G; [USP'U]/G
OINTMENT TOPICAL ONCE
Status: DISCONTINUED | OUTPATIENT
Start: 2020-11-02 | End: 2020-11-05 | Stop reason: HOSPADM

## 2020-11-02 RX ORDER — MIDAZOLAM HYDROCHLORIDE 1 MG/ML
INJECTION INTRAMUSCULAR; INTRAVENOUS
Status: COMPLETED | OUTPATIENT
Start: 2020-11-02 | End: 2020-11-02

## 2020-11-02 RX ORDER — BACITRACIN, NEOMYCIN, POLYMYXIN B 400; 3.5; 5 [USP'U]/G; MG/G; [USP'U]/G
OINTMENT TOPICAL
Status: COMPLETED | OUTPATIENT
Start: 2020-11-02 | End: 2020-11-02

## 2020-11-02 RX ORDER — LIDOCAINE HYDROCHLORIDE 20 MG/ML
INJECTION, SOLUTION INFILTRATION; PERINEURAL
Status: COMPLETED | OUTPATIENT
Start: 2020-11-02 | End: 2020-11-02

## 2020-11-02 RX ORDER — FENTANYL CITRATE 50 UG/ML
50 INJECTION, SOLUTION INTRAMUSCULAR; INTRAVENOUS
Status: DISCONTINUED | OUTPATIENT
Start: 2020-11-02 | End: 2020-11-05 | Stop reason: HOSPADM

## 2020-11-02 RX ADMIN — MIDAZOLAM HYDROCHLORIDE 0.5 MG: 2 INJECTION, SOLUTION INTRAMUSCULAR; INTRAVENOUS at 09:47

## 2020-11-02 RX ADMIN — FENTANYL CITRATE 50 MCG: 50 INJECTION, SOLUTION INTRAMUSCULAR; INTRAVENOUS at 10:02

## 2020-11-02 RX ADMIN — FENTANYL CITRATE 50 MCG: 50 INJECTION, SOLUTION INTRAMUSCULAR; INTRAVENOUS at 09:47

## 2020-11-02 RX ADMIN — BACITRACIN ZINC, NEOMYCIN SULFATE, POLYMYXIN B SULFATE 1 EACH: 3.5; 5000; 4 OINTMENT TOPICAL at 10:05

## 2020-11-02 RX ADMIN — SODIUM CHLORIDE 250 ML: 9 INJECTION, SOLUTION INTRAVENOUS at 09:33

## 2020-11-02 RX ADMIN — LIDOCAINE HYDROCHLORIDE 10 ML: 20 INJECTION, SOLUTION INFILTRATION; PERINEURAL at 09:52

## 2020-11-02 ASSESSMENT — ENCOUNTER SYMPTOMS
EYE PAIN: 0
SORE THROAT: 0
SHORTNESS OF BREATH: 0
WHEEZING: 0
TROUBLE SWALLOWING: 0
VOMITING: 0
EYE ITCHING: 0
CONSTIPATION: 0
DIARRHEA: 0
SINUS PAIN: 0
NAUSEA: 0
EYE REDNESS: 0
COUGH: 0
ABDOMINAL PAIN: 0
EYE DISCHARGE: 0
EYES NEGATIVE: 1
SINUS PRESSURE: 0
BACK PAIN: 0
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
## 2020-11-02 NOTE — PROGRESS NOTES
0755-Patient arrived and walked back to CT holding room. Patient is alert and oriented, able to follow commands. History, allergies, and home medications reviewed with patient. Patient able to change independently into gown. 0830-#20G IV started in patient's left hand. 10cc blood withdrawn for blood patch. 0835-Dr. Renzo Floyd and Raman Box notified that patient is be ready to be seen in CT holding room. 0900-Patient's daughter, Halie Carver called to see if she could come in to be with her. Halie Carver will be coming. 0915-Margoth Redmondap in to see patient. 0920-Consent for Cat Scan Guided Left Lung Biopsy obtained. Patient taken to CT for lung biopsy.

## 2020-11-02 NOTE — PRE SEDATION
Sedation Pre-Procedure Note    Patient Name: Irvin Plascencia   YOB: 1935  Room/Bed: Room/bed info not found  Medical Record Number: [id number]  Date: 2020   Time: 11:01 AM       Indication:  Left lung mass biopsy    Consent: I have discussed with the patient and/or the patient representative the indication, alternatives, and the possible risks and/or complications of the planned procedure and the anesthesia methods. The patient and/or patient representative appear to understand and agree to proceed. Vital Signs:   Vitals:    20 1006   BP: (!) 179/79   Pulse: 65   Resp: 13   SpO2: 96%       Past Medical History:   has a past medical history of Atrial fibrillation ([address] Cardiomyopathy, nonischemic ([address] Depression, Elevated glucose, HTN (hypertension), Hyperlipidemia, Osteopenia, Pulmonary hypertension ([address] Rheumatic heart disease, and Rib fractures. Past Surgical History:   has a past surgical history that includes Appendectomy; Hysterectomy;  section; Colonoscopy (); Colonoscopy (); and eye surgery. Medications:   Scheduled Meds:    neomycin-bacitracin-polymyxin   Topical Once    neomycin-bacitracin-polymyxin   Topical Once     Continuous Infusions:   PRN Meds: sodium chloride, sodium chloride, fentanNYL, fentanNYL, lidocaine, lidocaine, midazolam, midazolam  Home Meds:   Prior to Admission medications    Medication Sig Start Date End Date Taking?  Authorizing Provider   XARELTO 20 MG TABS tablet TAKE 1 TABLET BY MOUTH DAILY 10/30/20  Yes David Wall, DO   dilTIAZem (CARDIZEM CD) 120 MG extended release capsule TAKE 1 CAPSULE BY MOUTH DAILY 10/30/20  Yes David Wall, DO   metoprolol tartrate (LOPRESSOR) 50 MG tablet TAKE 1 TABLET BY MOUTH TWICE DAILY 10/30/20  Yes David Wall, DO   metoprolol tartrate (LOPRESSOR) 50 MG tablet Take 1 tablet by mouth 2 times daily 10/28/20  Yes David Wall, DO   atorvastatin (LIPITOR) 20 MG tablet Take 1 tablet by mouth nightly 8/19/20  Yes David Wall, DO   furosemide (LASIX) 20 MG tablet Take 1 tablet by mouth daily 8/19/20  Yes David Wall, DO   sacubitril-valsartan (ENTRESTO) 24-26 MG per tablet Take 1 tablet by mouth 2 times daily 8/19/20  Yes David Wall, DO   acetaminophen (TYLENOL) 325 MG tablet Take 650 mg by mouth every 6 hours as needed. Yes Historical Provider, MD   oxybutynin (DITROPAN) 5 MG tablet Take 1 tablet by mouth 3 times daily. Patient taking differently: Take 5 mg by mouth 2 times daily  2/10/12  Yes Beth Dolan MD   diphenhydrAMINE (BENADRYL) 25 MG tablet Take 25 mg by mouth nightly as needed. For sleep    Yes Historical Provider, MD   aspirin 81 MG EC tablet Take 81 mg by mouth daily. Yes Historical Provider, MD   multivitamin (ANTIOXIDANT;PROSIGHT) TABS per tablet Take 1 tablet by mouth daily. Yes Historical Provider, MD     Coumadin Use Last 7 Days:  no  Antiplatelet drug therapy use last 7 days: no  Other anticoagulant use last 7 days: no  Additional Medication Information:  none      Pre-Sedation Documentation and Exam:   I have personally completed a history, physical exam & review of systems for this patient (see notes).     Mallampati Airway Assessment:  Mallampati Class II - (soft palate, fauces & uvula are visible)    Prior History of Anesthesia Complications:   none    ASA Classification:  Class 3 - A patient with severe systemic disease that limits activity but is not incapacitating    Sedation/ Anesthesia Plan:   intravenous sedation    Medications Planned:   midazolam (Versed) intravenously and fentanyl intravenously    Patient is an appropriate candidate for plan of sedation: yes    Electronically signed by Rodrigo Rivers MD on 11/2/2020 at 11:01 AM

## 2020-11-02 NOTE — FLOWSHEET NOTE
1015-Patient arrived back from CT back to CT holding room. Patient is alert and oriented, able to follow commands. Patient's daughter, Bernardo, at cartside. Dressing to left chest area is clean, dry, intact. Patient has no complaints of pain. 1115-Patient taken to xray for ordered chest xray.

## 2020-11-11 RX ORDER — METOPROLOL TARTRATE 50 MG/1
TABLET, FILM COATED ORAL
Qty: 180 TABLET | Refills: 2 | Status: SHIPPED | OUTPATIENT
Start: 2020-11-11 | End: 2020-11-16 | Stop reason: SDUPTHER

## 2020-11-11 RX ORDER — DILTIAZEM HYDROCHLORIDE 120 MG/1
120 CAPSULE, COATED, EXTENDED RELEASE ORAL DAILY
Qty: 90 CAPSULE | Refills: 2 | Status: SHIPPED | OUTPATIENT
Start: 2020-11-11 | End: 2021-06-29 | Stop reason: SDUPTHER

## 2020-11-11 NOTE — TELEPHONE ENCOUNTER
requesting medication refill.  Please approve or deny this request.    Rx requested:  Requested Prescriptions     Pending Prescriptions Disp Refills    rivaroxaban (XARELTO) 20 MG TABS tablet 90 tablet 2     Sig: TAKE 1 TABLET BY MOUTH DAILY    dilTIAZem (CARDIZEM CD) 120 MG extended release capsule 90 capsule 2     Sig: Take 1 capsule by mouth daily    metoprolol tartrate (LOPRESSOR) 50 MG tablet 180 tablet 2     Sig: TAKE 1 TABLET BY MOUTH TWICE DAILY         Last Office Visit:   10/13/2020      Next Visit Date:  Future Appointments   Date Time Provider Yuridia López   11/16/2020  9:30 AM Varun Zuñiga, [address] [facility] EMERGENCY [facility] [place]   11/17/2020  1:45 PM Lorna Glover MD [facility]   2/16/2021  9:15 AM David Vazquez, DO [address]

## 2020-11-16 ENCOUNTER — OFFICE VISIT (OUTPATIENT)
Dept: CARDIOLOGY CLINIC | Age: 85
End: 2020-11-16
Payer: MEDICARE

## 2020-11-16 VITALS
RESPIRATION RATE: 18 BRPM | OXYGEN SATURATION: 97 % | HEIGHT: 61 IN | BODY MASS INDEX: 33.61 KG/M2 | SYSTOLIC BLOOD PRESSURE: 112 MMHG | HEART RATE: 82 BPM | WEIGHT: 178 LBS | DIASTOLIC BLOOD PRESSURE: 76 MMHG

## 2020-11-16 DIAGNOSIS — I50.32 CHRONIC DIASTOLIC CHF (CONGESTIVE HEART FAILURE), NYHA CLASS 1 (HCC): ICD-10-CM

## 2020-11-16 DIAGNOSIS — N28.9 RENAL INSUFFICIENCY: ICD-10-CM

## 2020-11-16 LAB
ANION GAP SERPL CALCULATED.3IONS-SCNC: 15 MEQ/L (ref 9–15)
BUN BLDV-MCNC: 25 MG/DL (ref 8–23)
CALCIUM SERPL-MCNC: 10 MG/DL (ref 8.5–9.9)
CHLORIDE BLD-SCNC: 103 MEQ/L (ref 95–107)
CO2: 23 MEQ/L (ref 20–31)
CREAT SERPL-MCNC: 1.33 MG/DL (ref 0.5–0.9)
GFR AFRICAN AMERICAN: 45.9
GFR NON-AFRICAN AMERICAN: 37.9
GLUCOSE BLD-MCNC: 171 MG/DL (ref 70–99)
POTASSIUM SERPL-SCNC: 4.4 MEQ/L (ref 3.4–4.9)
SODIUM BLD-SCNC: 141 MEQ/L (ref 135–144)

## 2020-11-16 PROCEDURE — G8399 PT W/DXA RESULTS DOCUMENT: HCPCS | Performed by: PHYSICIAN ASSISTANT

## 2020-11-16 PROCEDURE — 1036F TOBACCO NON-USER: CPT | Performed by: PHYSICIAN ASSISTANT

## 2020-11-16 PROCEDURE — 1123F ACP DISCUSS/DSCN MKR DOCD: CPT | Performed by: PHYSICIAN ASSISTANT

## 2020-11-16 PROCEDURE — 99214 OFFICE O/P EST MOD 30 MIN: CPT | Performed by: PHYSICIAN ASSISTANT

## 2020-11-16 PROCEDURE — G8417 CALC BMI ABV UP PARAM F/U: HCPCS | Performed by: PHYSICIAN ASSISTANT

## 2020-11-16 PROCEDURE — 4040F PNEUMOC VAC/ADMIN/RCVD: CPT | Performed by: PHYSICIAN ASSISTANT

## 2020-11-16 PROCEDURE — G8484 FLU IMMUNIZE NO ADMIN: HCPCS | Performed by: PHYSICIAN ASSISTANT

## 2020-11-16 PROCEDURE — 1090F PRES/ABSN URINE INCON ASSESS: CPT | Performed by: PHYSICIAN ASSISTANT

## 2020-11-16 PROCEDURE — G8427 DOCREV CUR MEDS BY ELIG CLIN: HCPCS | Performed by: PHYSICIAN ASSISTANT

## 2020-11-16 ASSESSMENT — ENCOUNTER SYMPTOMS
ABDOMINAL PAIN: 0
NAUSEA: 0
BLOOD IN STOOL: 0
SHORTNESS OF BREATH: 0
CHEST TIGHTNESS: 0
COLOR CHANGE: 0
VOMITING: 0
COUGH: 0

## 2020-11-16 NOTE — PROGRESS NOTES
Patient: Julia Allen  YOB: 1935  MRN: [id number]    Chief Complaint:  Chief Complaint   Patient presents with    Congestive Heart Failure     SYSTOLIC         Subjective/HPI        11/16/20: Here for follow-up of systolic congestive heart failure. Was last seen in the office in August 2020. Since that time, she states she has been doing very well overall. Her daughter is with her during office visit today. She has no new or worsening heart failure symptoms. Patient was reportedly out of her medications for about a week around a week ago and was not feeling that well but since resuming all of her meds she is back to her baseline. She has a history of a nonischemic cardiomyopathy with EF of 35 to 40% per echo in June 2020. She recently had repeat echocardiogram completed on 10/2/2020 which revealed improvement in LV systolic function with EF now 50%, mild 1+ mitral valve regurgitation, moderately elevated RVSP at 59 mmHg. She is compliant with low-sodium diet and fluid restriction. Per patient's daughter, patient does not weigh herself every morning but no weight gain reported. She is on oral anticoagulation with Xarelto for A. fib and denies any bleeding issues. She denies chest pain, palpitations, diaphoresis, shortness of breath or dyspnea on exertion, nausea, vomiting, dizziness, lightheadedness, paroxysmal nocturnal dyspnea, orthopnea, lower extremity edema, syncope, fever or chills. Patient follows with pulmonology and completed repeat CT of the chest on 8/3/2020 for evaluation of left upper lobe lung nodules. This CT revealed persistent cluster of noncalcified nodules within the anterior superior aspect of the left upper lobe. She had follow-up office visit with pulmonology on 9/15/2020 and was referred to interventional radiology for CT-guided lung biopsy.   She underwent CT-guided lung biopsy on 11/2/2020 and has follow-up appointment tomorrow 11/17/2020 with Dr. Mayra Ortiz to discuss pathology results. EKG 10/13/20:  SR 62, PACs, ST depression leads I, aVL, QTc 464ms    Echocardiogram 10/2/20:   Conclusions   Summary   Normal left ventricular systolic function, no regional wall motion   abnormalities, estimated ejection fraction of 50%. improved as compared to   previous echo on 6/2020   Normal left ventricular size and function. Normal left ventricular wall thickness. Mild (1+) mitral regurgitation is present. No evidence of mitral valve stenosis. No evidence of aortic valve regurgitation . No evidence of aortic valve stenosis. The left atrium is Mild to moderate dilated. There is mild tricuspid regurgitation with estimated RVSP of 59 mm Hg. Right ventricular systolic pressure of 59 mm Hg consistent with moderate   pulmonary hypertension. Signature   ----------------------------------------------------------------   Electronically signed by Halie Packer DO(Interpreting   physician) on 10/02/2020 05:39 PM    Most recent labs reviewed and documented below. BMP 8/17/2020: Sodium 141, potassium 4.1, chloride 107, total CO2 23, BUN elevated 26, creatinine elevated 0.98, GFR low at 53.9, glucose 145  proBNP 8/17/2020: 902 compared to 2004 on 7/9/2020    Vital signs stable in office today. Blood pressure 112/76, heart rate 82, pulse ox 97% on room air. Weight is 178 pounds which is the exact same weight she was on 8/17/2020.        8/17/20: Here for follow-up of systolic congestive heart failure and nonischemic cardiomyopathy. Was last seen in office on 7/9/2020 and since that time is been doing very well overall. She denies any new or worsening CHF symptoms. She is compliant with all of her medications. She does report taking Lopressor only 50 mg once a day instead of twice daily and has been advised to go home and clarify exact medication in frequency is listed on medication bottle and call us back. Daughter accompanies her in office today.   She denies any complaint of chest pain, shortness of breath or significant dyspnea on exertion, nausea, vomiting, dizziness, lightheadedness, diaphoresis, palpitations, paroxysmal nocturnal dyspnea, orthopnea, worsening lower extremity edema, syncope, fever or chills. She is on oral anticoagulation with Xarelto and denies any bleeding issues. She is adherent to low-sodium diet and fluid restriction but states that she does eat a significant amount of cheese which she has been cautioned to limit. Most recent labs reviewed and documented below. BMP 7/9/2020: Sodium 143, potassium 4.1, chloride 103, total CO2 30, BUN elevated 24, creatinine elevated 1.16, GFR low at 44.4, glucose 91  proBNP 7/9/2020: 2004 compared to as high as 4654 on 6/24/2020    Vital signs stable in office today. Blood pressure 149/77, heart rate 74, pulse ox 98% on room air. Weight is 178 pounds compared to 175 pounds at last visit on 7/9/2020. Past with patient and daughter regarding repeat echocardiogram to reevaluate LV function and mitral regurgitation. This will be scheduled no sooner than September 26, 2020 which is approx 3 months from her prior echo. 7/9/20 CHF clinic visit #1: This is a very pleasant 71-year-old  female who presents for initial CHF clinic evaluation following recent hospital admission for new onset A. fib RVR and new onset congestive heart failure. Echocardiogram completed during hospital admission revealed reduced LV systolic function with ejection fraction of 35 to 40% and 3-4+ MR with RVSP of 60 mmHg. She was initiated on IV diuresis with Lasix as well anticoagulation with full dose Lovenox and optimized on rate control and CHF medications. Given new onset congestive heart failure, mild troponin elevation, new onset A. fib and cardiomyopathy patient underwent cardiac catheterization on 6/26/2020 to rule out underlying cardiac ischemia.   Cardiac catheterization revealed normal coronary arteries with EF of 45%.  Also, patient underwent BEBE on 6/26/2020 which revealed 2+ MR, RVSP 48 mmHg and EF of 40%. Patient had initially converted back to sinus rhythm after initiation of IV Cardizem in the emergency room however she converted back to A. fib RVR during admission. Rate control medications were titrated and on 6/27/2020 she had converted back to sinus rhythm. Asymptomatically improved during the course of her admission. She was transitioned to oral anticoagulation with Xarelto at discharge. She was discharged home in stable condition on 6/27/2020. Since discharge, patient states that she has been doing very well overall and has had continued improvement in her shortness of breath. She will still experience shortness of breath with moderate exertion such as when walking up a flight of steps but otherwise is able to carry out her normal activities without any trouble breathing. She admits to being sedentary recently not feeling like doing many of the normal activities that she used to. Denies chest pain, palpitations, diaphoresis, paroxysmal nocturnal dyspnea, orthopnea, lower extremity edema, syncope, cough, fever or chills. Denies any difficulty urinating or any painful urination. She denies any bleeding issues on Xarelto including hematochezia or melena. He is adherent to a low-sodium diet as well as fluid restriction. She has been weighing herself on a daily basis and weight at home this morning was 175 pounds compared to 182 pounds on day of discharge from the hospital.    EKG in office today shows sinus rhythm with ventricular rate of 63 bpm, ST depression in leads I and aVL and poor R wave progression in V2 through V6,  ms. Recent labs reviewed and documented below.   BMP 6/27/2020: Sodium 142, potassium 3.8, chloride 106, total CO2 24, BUN 12, creatinine 1.11, GFR 46.7, glucose 120  CBC 6/26/2020: WBC 10.3, hemoglobin 14.9, hematocrit 44.8, platelets 649  proBNP 6/24/2020: 4654  TSH 6/24/2020: 1.40      Vital signs stable in office today with blood pressure 118/82, heart rate 64, pulse ox 98% on room air. Weight is 175 pounds      Echocardiogram 6/24/20:  Conclusions   Summary   Left ventricular ejection fraction is visually estimated at 35-40%. global   hypokinesis. difficult to estimate due to afib. Left ventricular size is normal .   Pseudonormal filling pattern noted. Moderately severe (3-4+) mitral regurgitation is present. No evidence of mitral valve stenosis. No evidence of aortic valve regurgitation . No evidence of aortic valve stenosis. There is severe tricuspid regurgitation with estimated RVSP of 60 mm Hg. Right ventricular systolic pressure of 60 mm Hg consistent with moderate   pulmonary hypertension. The left atrium is Moderately dilated. Signature   ----------------------------------------------------------------   Electronically signed by Waleska Milligan DO(Interpreting   physician) on 06/25/2020 05:56 PM    [facility] 6/26/20:  Conclusions  Procedure Summary  normal coronaries  normal LVF  PCWP 20mmHg  mod PHTN  normal C. I and C.O  no shunts. Recommendations  Aggressive risk factor management. Maximize medical therapy. Angiographic Findings   Cardiac Arteries and Lesion Findings  LMCA: Normal.  LAD: small caliber, tapers distally. LCx: Normal.  RCA: Normal.  Dominance: Right  LV function assessed as:Abnormal.  Ejection Fraction    - Method: LV gram. EF%: 45. BEBE 6/26/20:  Conclusions   Summary   Left ventricular ejection fraction is visually estimated at 40%. Left ventricular size is mildly increased . Moderate (2+) mitral regurgitation is present. No evidence of aortic valve regurgitation . There is mild tricuspid regurgitation with estimated RVSP of 48 mm Hg. No evidence of left to right shunt with color flow doppler or right to   left shunt with agitated saline contrast study. Moderately dilated LA. Recommendation   [facility] to follow. Signature   ----------------------------------------------------------------   Electronically signed by Amador Choi DO(Interpreting   physician) on 2020 09:26 AM      PMHx:  Newly diagnosed A-fib  Normal coronaries per [facility] 20  Moderate 2+ MR  Systolic CHF     PSHx:   x 3  Tonsillectomy  Hysterectomy    Allergies:  None    Social Hx:  No smoking  Social drinker       No Known Allergies    Current Outpatient Medications   Medication Sig Dispense Refill    rivaroxaban (XARELTO) 20 MG TABS tablet TAKE 1 TABLET BY MOUTH DAILY 90 tablet 2    dilTIAZem (CARDIZEM CD) 120 MG extended release capsule Take 1 capsule by mouth daily 90 capsule 2    sacubitril-valsartan (ENTRESTO) 24-26 MG per tablet Take 1 tablet by mouth 2 times daily 60 tablet 0    metoprolol tartrate (LOPRESSOR) 50 MG tablet Take 1 tablet by mouth 2 times daily 180 tablet 3    atorvastatin (LIPITOR) 20 MG tablet Take 1 tablet by mouth nightly 90 tablet 3    furosemide (LASIX) 20 MG tablet Take 1 tablet by mouth daily 180 tablet 3    acetaminophen (TYLENOL) 325 MG tablet Take 650 mg by mouth every 6 hours as needed.  oxybutynin (DITROPAN) 5 MG tablet Take 1 tablet by mouth 3 times daily. (Patient taking differently: Take 5 mg by mouth 2 times daily ) 270 tablet 1    diphenhydrAMINE (BENADRYL) 25 MG tablet Take 25 mg by mouth nightly as needed. For sleep       aspirin 81 MG EC tablet Take 81 mg by mouth daily.  multivitamin (ANTIOXIDANT;PROSIGHT) TABS per tablet Take 1 tablet by mouth daily. No current facility-administered medications for this visit.         Past Medical History:   Diagnosis Date    [facility]     [facility] 10/13/2020    Depression     Elevated glucose     HTN (hypertension)     Hyperlipidemia     Osteopenia     Hips    Pulmonary hypertension ([facility] 10/13/2020    Rheumatic heart disease     as a child    Rib fractures     Secondary to AA Past Surgical History:   Procedure Laterality Date    APPENDECTOMY       SECTION      COLONOSCOPY  2003    Internal hemorrhoids    COLONOSCOPY  2011    normal    CT NEEDLE BIOPSY LUNG PERCUTANEOUS  2020    CT NEEDLE BIOPSY LUNG PERCUTANEOUS 2020 [facility] CT SCAN    EYE SURGERY      HYSTERECTOMY         Social History     Socioeconomic History    Marital status:      Spouse name: None    Number of children: None    Years of education: None    Highest education level: None   Occupational History    None   Social Needs    Financial resource strain: None    Food insecurity     Worry: None     Inability: None    Transportation needs     Medical: None     Non-medical: None   Tobacco Use    Smoking status: Never Smoker    Smokeless tobacco: Never Used   Substance and Sexual Activity    Alcohol use: No    Drug use: Never    Sexual activity: Not Currently     Partners: Male   Lifestyle    Physical activity     Days per week: None     Minutes per session: None    Stress: None   Relationships    Social connections     Talks on phone: None     Gets together: None     Attends [employer] service: None     Active member of club or organization: None     Attends meetings of clubs or organizations: None     Relationship status: None    Intimate partner violence     Fear of current or ex partner: None     Emotionally abused: None     Physically abused: None     Forced sexual activity: None   Other Topics Concern    None   Social History Narrative    None       Family History   Problem Relation Age of Onset    Heart Disease Mother         Valvular heart disease from childhood rheumatic fever    Diabetes Maternal Aunt          Review of Systems:   Review of Systems   Constitutional: Negative for activity change, fatigue and fever. HENT: Negative for congestion. Respiratory: Negative for cough, chest tightness and shortness of breath.     Cardiovascular: Negative for chest pain, MPV 9.7 07/09/2014     Lipids:  Lab Results   Component Value Date    CHOL 207 (H) 07/09/2014    CHOL 192 05/25/2012     Lab Results   Component Value Date    TRIG 101 07/09/2014    TRIG 117 05/25/2012     Lab Results   Component Value Date    HDL 69 (H) 07/09/2014    HDL 51 05/25/2012     Lab Results   Component Value Date    LDLCALC 118 07/09/2014    LDLCALC 122 05/25/2012     No results found for: LABVLDL, VLDL  Lab Results   Component Value Date    CHOLHDLRATIO 3.8 05/25/2012     CMP:    Lab Results   Component Value Date     08/17/2020    K 4.1 08/17/2020     08/17/2020    CO2 23 08/17/2020    BUN 26 08/17/2020    CREATININE 0.98 08/17/2020    GFRAA >60.0 08/17/2020    LABGLOM 53.9 08/17/2020    GLUCOSE 145 08/17/2020    GLUCOSE 123 05/25/2012    PROT 6.9 06/24/2020    LABALBU 3.6 06/24/2020    LABALBU 4.2 05/25/2012    CALCIUM 9.3 08/17/2020    BILITOT 0.3 06/24/2020    ALKPHOS 50 06/24/2020    AST 23 06/24/2020    ALT 41 06/24/2020     BMP:    Lab Results   Component Value Date     08/17/2020    K 4.1 08/17/2020     08/17/2020    CO2 23 08/17/2020    BUN 26 08/17/2020    LABALBU 3.6 06/24/2020    LABALBU 4.2 05/25/2012    CREATININE 0.98 08/17/2020    CALCIUM 9.3 08/17/2020    GFRAA >60.0 08/17/2020    LABGLOM 53.9 08/17/2020    GLUCOSE 145 08/17/2020    GLUCOSE 123 05/25/2012     Magnesium:    Lab Results   Component Value Date    MG 2.1 06/24/2020     TSH:  Lab Results   Component Value Date    TSH 1.400 06/24/2020     . result  No results for input(s): PROBNP in the last 72 hours. No results for input(s): INR in the last 72 hours. Patient Active Problem List   Diagnosis    HTN (hypertension)    Elevated glucose    Hyperlipidemia    Osteopenia    A-fib (HCC)    Lung nodule    Cardiomy[facility], [facility]ischemic [facility]    Pulmonary hypertension ([facility]       Assessment/Plan:     Diagnosis Orders   1.  Chronic diastolic CHF (congestive heart failure), NYHA class 1 ([facility] compensated currently   2. History of cardiomyopathy      Hx NICMP per echo 6/2020. Restored LV function with EF of 50% per echo 10/2/2020   3. PAF (paroxysmal atrial fibrillation) ([address]      on [facility] with xarelto   4. Moderate mitral regurgitation      improved with only mild (1+) MR per echo 10/2/20   5. Moderate pulmonary arterial systolic hypertension (HCC)      RVSP 59mmHg per echo 10/2/20   6. Renal insufficiency  Basic Metabolic Panel    repeat BMP today to monitor renal function       -Maximize medical therapy--aspirin 81 mg p.o. daily, Lopressor 50 mg p.o. twice daily, Cardizem  mg p.o. daily, Entresto 24/26 mg p.o. twice daily, Lasix 20 mg p.o. daily, Lipitor 20 mg tablet p.o. daily at bedtime, OAC with Xarelto 20 mg p.o. daily (CrCl 62.9 as of 11/16/20 based on BMP from 8/17/20)  -Heart Failure appears compensated at this time. No need for further medication adjustments  **May consider switching Entresto to ARB in future as LV systolic function has normalized and Entresto is too costly for patient and she relies on samples through the office**  -Check BMP today to monitor stability and renal function given mild renal insufficiency when last checked in 8/20/2020  -Cardiac/<2 gram sodium diet recommended  -1800-2000mL daily fluid restriction   -Instructed patient to weigh self daily every morning upon waking and keep log book of daily weights. Notify office if gaining more than 3 pounds in 48 hours.   -Advised patient to notify office immediately if experiencing any progressive SOB, orthopnea, PND, LE edema or weight gain  -Educated patient on importance of fluid and salt restriction as well as lifestyle modification  -EKG in office 10/3/20 showing patient to be maintaining sinus rhythm  -Repeat echocardiogram completed 10/2/2020 revealed improvement in LV systolic function with EF now 50%, mild mitral valve regurgitation and moderate pulmonary hypertension with RVSP of 59 mmHg  -May need to consider

## 2020-11-17 ENCOUNTER — OFFICE VISIT (OUTPATIENT)
Dept: PULMONOLOGY | Age: 85
End: 2020-11-17
Payer: MEDICARE

## 2020-11-17 VITALS
WEIGHT: 178.4 LBS | SYSTOLIC BLOOD PRESSURE: 112 MMHG | DIASTOLIC BLOOD PRESSURE: 60 MMHG | RESPIRATION RATE: 16 BRPM | HEIGHT: 61 IN | HEART RATE: 48 BPM | TEMPERATURE: 97.3 F | OXYGEN SATURATION: 98 % | BODY MASS INDEX: 33.68 KG/M2

## 2020-11-17 PROCEDURE — 1090F PRES/ABSN URINE INCON ASSESS: CPT | Performed by: INTERNAL MEDICINE

## 2020-11-17 PROCEDURE — 1036F TOBACCO NON-USER: CPT | Performed by: INTERNAL MEDICINE

## 2020-11-17 PROCEDURE — G8417 CALC BMI ABV UP PARAM F/U: HCPCS | Performed by: INTERNAL MEDICINE

## 2020-11-17 PROCEDURE — G8427 DOCREV CUR MEDS BY ELIG CLIN: HCPCS | Performed by: INTERNAL MEDICINE

## 2020-11-17 PROCEDURE — G8484 FLU IMMUNIZE NO ADMIN: HCPCS | Performed by: INTERNAL MEDICINE

## 2020-11-17 PROCEDURE — 4040F PNEUMOC VAC/ADMIN/RCVD: CPT | Performed by: INTERNAL MEDICINE

## 2020-11-17 PROCEDURE — 1123F ACP DISCUSS/DSCN MKR DOCD: CPT | Performed by: INTERNAL MEDICINE

## 2020-11-17 PROCEDURE — 99214 OFFICE O/P EST MOD 30 MIN: CPT | Performed by: INTERNAL MEDICINE

## 2020-11-17 PROCEDURE — G8399 PT W/DXA RESULTS DOCUMENT: HCPCS | Performed by: INTERNAL MEDICINE

## 2020-11-17 NOTE — PROGRESS NOTES
Subjective:     Judithann Gosselin is a 80 y.o. female who complains today of:     Chief Complaint   Patient presents with    Follow-up     3 Month F/U for Lung Nodule    Results     CT guided biopsy       HPI  Patient presents for lung nodule    Patient presents for evaluation of lung nodule, she is doing good, she denies any chest pain, no shortness of breath, no coughing, weight is stable, no skin rash, no nasal congestion or postnasal drip, no lower extremity edema, active feels healthy, no fever or chills and sleeps well. Allergies:  Patient has no known allergies.   Past Medical History:   Diagnosis Date    Atrial fibrillation ([facility] [address]     Cardiomyopathy, nonischemic ([facility] [address] 10/13/2020    Depression     Elevated glucose     HTN (hypertension)     Hyperlipidemia     Osteopenia     Hips    Pulmonary hypertension ([facility] [address] 10/13/2020    Rheumatic heart disease     as a child    Rib fractures     Secondary to AA     Past Surgical History:   Procedure Laterality Date    APPENDECTOMY       SECTION      COLONOSCOPY      Internal hemorrhoids    COLONOSCOPY      normal    CT NEEDLE BIOPSY LUNG PERCUTANEOUS  2020    CT NEEDLE BIOPSY LUNG PERCUTANEOUS 2020 [facility] CT SCAN    EYE SURGERY      HYSTERECTOMY       Family History   Problem Relation Age of Onset    Heart Disease Mother         Valvular heart disease from childhood rheumatic fever    Diabetes Maternal Aunt      Social History     Socioeconomic History    Marital status:      Spouse name: Not on file    Number of children: Not on file    Years of education: Not on file    Highest education level: Not on file   Occupational History    Not on file   Social Needs    Financial resource strain: Not on file    Food insecurity     Worry: Not on file     Inability: Not on file    Transportation needs     Medical: Not on file     Non-medical: Not on file   Tobacco Use    Smoking status: Never Smoker    Smokeless August 2020 normal spirometry and lung volumes, mildly reduced DLCO  ECHO: October 2020 RVSP 59, 1+ mitral regurgitation, EF 50%    Assessment and Plan       Diagnosis Orders   1. Lung nodule  CT CHEST WO CONTRAST   2. Pulmonary hypertension ([address]     3. Diffusion capacity of lung (dl), decreased       · Lung nodule, CT-guided biopsy nondiagnostic, PET is negative, non-smoker, and culture so far negative. Etiology is not clear while benign etiology is most likely cannot rule out underlying slow-growing FEROZ  · Discussed with patient and her daughter potential diagnosis and concern of possibility of slow-growing cancer, options are surgical resection versus monitoring. Patient elected monitoring for now. She will call me if she changes her mind. · I will request second opinion regarding pathology read from [facility] and I will update the patient daughter with the results when this comes back. · Will order CT chest in 3 months and I will see her after that CT. · Decreased DLCO is mild and is likely due to pulmonary hypertension owing to left heart disease      Orders Placed This Encounter   Procedures    CT CHEST WO CONTRAST     Standing Status:   Future     Standing Expiration Date:   11/17/2021     No orders of the defined types were placed in this encounter. Discussed with patient the importance of exercise and weight control and  overall health and well-being. Reviewed with the patient: current clinical status, medications, activities and diet. Side effects, adverse effects of the medication prescribed today, as well as treatment plan and result expectations have been discussed with the patient who expresses understanding and desires to proceed. Return in about 3 months (around 2/17/2021). I spent 30 min with this patient, greater the 50% of this time was spent in counseling and/or coordinating of care.         Parviz Fairbanks MD

## 2020-12-01 ENCOUNTER — TELEPHONE (OUTPATIENT)
Dept: PULMONOLOGY | Age: 85
End: 2020-12-01

## 2020-12-01 NOTE — TELEPHONE ENCOUNTER
Spoke with patient daughter over the phone, reviewed the result of [facility] read again atypical cells no malignant cells and biopsy essentially nondiagnostic. Again reviewed option to proceed with surgical biopsy however she still would like to do monitoring and they will call me and let me know if patient changes her mind.

## 2020-12-05 LAB
FINAL REPORT: NORMAL
PRELIMINARY: NORMAL

## 2020-12-14 ENCOUNTER — TELEPHONE (OUTPATIENT)
Dept: CARDIOLOGY CLINIC | Age: 85
End: 2020-12-14

## 2020-12-14 NOTE — TELEPHONE ENCOUNTER
The patient is asking for samples of xarelto and entresto. Please call when they are ready. Thanks [phone]. Thank you.

## 2020-12-29 LAB
AFB STAIN: NORMAL
FINAL REPORT: NORMAL
PRELIMINARY: NORMAL

## 2021-01-21 ENCOUNTER — HOSPITAL ENCOUNTER (OUTPATIENT)
Dept: ULTRASOUND IMAGING | Age: 86
Discharge: HOME | End: 2021-01-23
Payer: MEDICARE

## 2021-01-21 DIAGNOSIS — M79.605 LEFT LEG PAIN: ICD-10-CM

## 2021-01-21 PROCEDURE — 93971 EXTREMITY STUDY: CPT

## 2021-01-25 ENCOUNTER — TELEPHONE (OUTPATIENT)
Dept: CARDIOLOGY CLINIC | Age: 86
End: 2021-01-25

## 2021-01-25 DIAGNOSIS — I48.0 PAROXYSMAL ATRIAL FIBRILLATION (HCC): ICD-10-CM

## 2021-01-25 DIAGNOSIS — I50.22 CHRONIC SYSTOLIC CHF (CONGESTIVE HEART FAILURE), NYHA CLASS 1 (HCC): ICD-10-CM

## 2021-01-25 DIAGNOSIS — I42.8 CARDIOMYOPATHY, NONISCHEMIC (HCC): ICD-10-CM

## 2021-02-15 DIAGNOSIS — I42.8 CARDIOMYOPATHY, NONISCHEMIC (HCC): ICD-10-CM

## 2021-02-15 DIAGNOSIS — I50.22 CHRONIC SYSTOLIC CHF (CONGESTIVE HEART FAILURE), NYHA CLASS 1 (HCC): ICD-10-CM

## 2021-02-15 DIAGNOSIS — I48.0 PAROXYSMAL ATRIAL FIBRILLATION (HCC): ICD-10-CM

## 2021-02-15 NOTE — TELEPHONE ENCOUNTER
Given:  Entresto 24/26 mg # 1   Lot # [id number]  Exp. 10/22    Given:  Xarelto 20 mg # 2   Lot # [id number]  Exp. 09/23

## 2021-02-22 ENCOUNTER — HOSPITAL ENCOUNTER (OUTPATIENT)
Dept: GENERAL RADIOLOGY | Age: 86
Discharge: HOME | End: 2021-02-24
Payer: MEDICARE

## 2021-02-22 DIAGNOSIS — M79.605 LEFT LEG PAIN: ICD-10-CM

## 2021-02-22 PROCEDURE — 72114 X-RAY EXAM L-S SPINE BENDING: CPT

## 2021-02-22 PROCEDURE — 73564 X-RAY EXAM KNEE 4 OR MORE: CPT

## 2021-03-26 ENCOUNTER — TELEPHONE (OUTPATIENT)
Dept: CARDIOLOGY CLINIC | Age: 86
End: 2021-03-26

## 2021-03-26 DIAGNOSIS — I48.0 PAROXYSMAL ATRIAL FIBRILLATION (HCC): ICD-10-CM

## 2021-03-26 NOTE — TELEPHONE ENCOUNTER
Patient came to this [facility] Rescheduled appointment. Patient also requestin samples - XERALTO 20mg and ENTRESTO 24-26  Patient receive 6 XERALTO and advised to stop by [facility] office for [facility] Ismael Rothman after confirming with Portia Baldwin.

## 2021-04-12 ENCOUNTER — HOSPITAL ENCOUNTER (OUTPATIENT)
Dept: CT IMAGING | Age: 86
Discharge: HOME | End: 2021-04-14
Payer: MEDICARE

## 2021-04-12 DIAGNOSIS — R91.1 LUNG NODULE: ICD-10-CM

## 2021-04-12 PROCEDURE — 71250 CT THORAX DX C-: CPT

## 2021-04-20 ENCOUNTER — OFFICE VISIT (OUTPATIENT)
Dept: PULMONOLOGY | Age: 86
End: 2021-04-20
Payer: MEDICARE

## 2021-04-20 VITALS
WEIGHT: 173.4 LBS | OXYGEN SATURATION: 95 % | RESPIRATION RATE: 16 BRPM | HEART RATE: 89 BPM | TEMPERATURE: 97.9 F | SYSTOLIC BLOOD PRESSURE: 140 MMHG | DIASTOLIC BLOOD PRESSURE: 64 MMHG | HEIGHT: 61 IN | BODY MASS INDEX: 32.74 KG/M2

## 2021-04-20 DIAGNOSIS — I27.20 PULMONARY HYPERTENSION (HCC): ICD-10-CM

## 2021-04-20 DIAGNOSIS — R91.1 LUNG NODULE: Primary | ICD-10-CM

## 2021-04-20 DIAGNOSIS — E66.09 CLASS 1 OBESITY DUE TO EXCESS CALORIES WITHOUT SERIOUS COMORBIDITY WITH BODY MASS INDEX (BMI) OF 32.0 TO 32.9 IN ADULT: ICD-10-CM

## 2021-04-20 PROCEDURE — G8399 PT W/DXA RESULTS DOCUMENT: HCPCS | Performed by: INTERNAL MEDICINE

## 2021-04-20 PROCEDURE — 99213 OFFICE O/P EST LOW 20 MIN: CPT | Performed by: INTERNAL MEDICINE

## 2021-04-20 PROCEDURE — 1123F ACP DISCUSS/DSCN MKR DOCD: CPT | Performed by: INTERNAL MEDICINE

## 2021-04-20 PROCEDURE — G8417 CALC BMI ABV UP PARAM F/U: HCPCS | Performed by: INTERNAL MEDICINE

## 2021-04-20 PROCEDURE — G8427 DOCREV CUR MEDS BY ELIG CLIN: HCPCS | Performed by: INTERNAL MEDICINE

## 2021-04-20 PROCEDURE — 4040F PNEUMOC VAC/ADMIN/RCVD: CPT | Performed by: INTERNAL MEDICINE

## 2021-04-20 PROCEDURE — 1036F TOBACCO NON-USER: CPT | Performed by: INTERNAL MEDICINE

## 2021-04-20 PROCEDURE — 1090F PRES/ABSN URINE INCON ASSESS: CPT | Performed by: INTERNAL MEDICINE

## 2021-04-20 NOTE — PROGRESS NOTES
Activity    Alcohol use: No    Drug use: Never    Sexual activity: Not Currently     Partners: Male   Lifestyle    Physical activity     Days per week: Not on file     Minutes per session: Not on file    Stress: Not on file   Relationships    Social connections     Talks on phone: Not on file     Gets together: Not on file     Attends Orthodoxy service: Not on file     Active member of club or organization: Not on file     Attends meetings of clubs or organizations: Not on file     Relationship status: Not on file    Intimate partner violence     Fear of current or ex partner: Not on file     Emotionally abused: Not on file     Physically abused: Not on file     Forced sexual activity: Not on file   Other Topics Concern    Not on file   Social History Narrative    Not on file         Review of Systems      ROS: 10 organs review of system is done including general, psychological, ENT, hematological, endocrine, respiratory, cardiovascular, gastrointestinal,musculoskeletal, neurological,  allergy and Immunology is done and is otherwise negative. Current Outpatient Medications   Medication Sig Dispense Refill    rivaroxaban (XARELTO) 20 MG TABS tablet TAKE 1 TABLET BY MOUTH DAILY 32 tablet 0    sacubitril-valsartan (ENTRESTO) 24-26 MG per tablet Take 1 tablet by mouth 2 times daily 56 tablet 0    dilTIAZem (CARDIZEM CD) 120 MG extended release capsule Take 1 capsule by mouth daily 90 capsule 2    metoprolol tartrate (LOPRESSOR) 50 MG tablet Take 1 tablet by mouth 2 times daily 180 tablet 3    atorvastatin (LIPITOR) 20 MG tablet Take 1 tablet by mouth nightly 90 tablet 3    furosemide (LASIX) 20 MG tablet Take 1 tablet by mouth daily 180 tablet 3    acetaminophen (TYLENOL) 325 MG tablet Take 650 mg by mouth every 6 hours as needed.  oxybutynin (DITROPAN) 5 MG tablet Take 1 tablet by mouth 3 times daily.  (Patient taking differently: Take 5 mg by mouth 2 times daily ) 270 tablet 1    diphenhydrAMINE (BENADRYL) 25 MG tablet Take 25 mg by mouth nightly as needed. For sleep       aspirin 81 MG EC tablet Take 81 mg by mouth daily.  multivitamin (ANTIOXIDANT;PROSIGHT) TABS per tablet Take 1 tablet by mouth daily. No current facility-administered medications for this visit. Objective:     Vitals:    04/20/21 1533 04/20/21 1540   BP: (!) 141/63 (!) 140/64   Site: Right Upper Arm Left Upper Arm   Position: Sitting Sitting   Cuff Size: Large Adult Large Adult   Pulse: 89    Resp: 16    Temp: 97.9 °F (36.6 °C)    TempSrc: Tympanic    SpO2: 95%    Weight: 173 lb 6.4 oz (78.7 kg)    Height: 5' 1\" (1.549 m)          Physical Exam  Constitutional:       General: She is not in acute distress. Appearance: She is well-developed. She is not diaphoretic. HENT:      Head: Normocephalic and atraumatic. Eyes:      Conjunctiva/sclera: Conjunctivae normal.      Pupils: Pupils are equal, round, and reactive to light. Neck:      Musculoskeletal: Normal range of motion and neck supple. Cardiovascular:      Rate and Rhythm: Normal rate and regular rhythm. Heart sounds: No murmur. No friction rub. No gallop. Pulmonary:      Effort: Pulmonary effort is normal. No respiratory distress. Breath sounds: Normal breath sounds. No wheezing or rales. Chest:      Chest wall: No tenderness. Abdominal:      General: There is no distension. Palpations: Abdomen is soft. Tenderness: There is no abdominal tenderness. There is no rebound. Musculoskeletal:         General: No tenderness. Right lower leg: No edema. Left lower leg: No edema. Lymphadenopathy:      Cervical: No cervical adenopathy. Skin:     General: Skin is warm and dry. Findings: No erythema. Neurological:      Mental Status: She is alert and oriented to person, place, and time.    Psychiatric:         Judgment: Judgment normal.         Imaging studies reviewed by me CT chest shows left upper

## 2021-05-04 DIAGNOSIS — I48.0 PAROXYSMAL ATRIAL FIBRILLATION (HCC): ICD-10-CM

## 2021-05-19 ENCOUNTER — OFFICE VISIT (OUTPATIENT)
Dept: CARDIOLOGY CLINIC | Age: 86
End: 2021-05-19
Payer: MEDICARE

## 2021-05-19 VITALS
DIASTOLIC BLOOD PRESSURE: 72 MMHG | RESPIRATION RATE: 18 BRPM | HEIGHT: 61 IN | WEIGHT: 174 LBS | SYSTOLIC BLOOD PRESSURE: 142 MMHG | OXYGEN SATURATION: 97 % | HEART RATE: 69 BPM | BODY MASS INDEX: 32.85 KG/M2

## 2021-05-19 DIAGNOSIS — Z86.79 HISTORY OF CARDIOMYOPATHY: ICD-10-CM

## 2021-05-19 DIAGNOSIS — I27.21 MODERATE PULMONARY ARTERIAL SYSTOLIC HYPERTENSION (HCC): ICD-10-CM

## 2021-05-19 DIAGNOSIS — I34.0 MILD MITRAL REGURGITATION: ICD-10-CM

## 2021-05-19 DIAGNOSIS — I50.32 CHRONIC DIASTOLIC CHF (CONGESTIVE HEART FAILURE), NYHA CLASS 1 (HCC): ICD-10-CM

## 2021-05-19 DIAGNOSIS — I48.0 PAF (PAROXYSMAL ATRIAL FIBRILLATION) (HCC): ICD-10-CM

## 2021-05-19 DIAGNOSIS — N28.9 RENAL INSUFFICIENCY: ICD-10-CM

## 2021-05-19 PROCEDURE — 4040F PNEUMOC VAC/ADMIN/RCVD: CPT | Performed by: PHYSICIAN ASSISTANT

## 2021-05-19 PROCEDURE — 1036F TOBACCO NON-USER: CPT | Performed by: PHYSICIAN ASSISTANT

## 2021-05-19 PROCEDURE — 1123F ACP DISCUSS/DSCN MKR DOCD: CPT | Performed by: PHYSICIAN ASSISTANT

## 2021-05-19 PROCEDURE — 1090F PRES/ABSN URINE INCON ASSESS: CPT | Performed by: PHYSICIAN ASSISTANT

## 2021-05-19 PROCEDURE — 99213 OFFICE O/P EST LOW 20 MIN: CPT | Performed by: PHYSICIAN ASSISTANT

## 2021-05-19 PROCEDURE — G8417 CALC BMI ABV UP PARAM F/U: HCPCS | Performed by: PHYSICIAN ASSISTANT

## 2021-05-19 PROCEDURE — G8427 DOCREV CUR MEDS BY ELIG CLIN: HCPCS | Performed by: PHYSICIAN ASSISTANT

## 2021-05-19 PROCEDURE — G8399 PT W/DXA RESULTS DOCUMENT: HCPCS | Performed by: PHYSICIAN ASSISTANT

## 2021-05-19 ASSESSMENT — ENCOUNTER SYMPTOMS
COUGH: 0
CHEST TIGHTNESS: 0
ABDOMINAL PAIN: 0
COLOR CHANGE: 0
VOMITING: 0
BLOOD IN STOOL: 0
SHORTNESS OF BREATH: 0
NAUSEA: 0

## 2021-05-19 NOTE — PROGRESS NOTES
Patient: Mahendra Gaston  YOB: 1935  MRN: [id number]    Chief Complaint:  Chief Complaint   Patient presents with    Congestive Heart Failure     diastolic    Fatigue         Subjective/HPI        5/19/21: Patient here for follow-up of chronic congestive heart failure and history of nonischemic cardiomyopathy with recovered LV function per echo 10/20/2020. Patient was last seen in the office on 11/16/2020 and since that time has been doing well with no new or worsening heart failure symptoms. She is accompanied by her daughter today in office. She is compliant with all of her medications and with low-sodium diet and fluid restriction. She does not check her weight daily but when she does she has not noticed any significant weight gain. She is actually lost 4 pounds since her last office visit with me in November. She denies shortness of breath or dyspnea on exertion, chest pain, palpitations, diaphoresis, paroxysmal nocturnal dyspnea, orthopnea, lower extremity edema, syncope, fever or chills. She is on oral anticoagulation with Xarelto and denies any bleeding issues. Last blood work on record was completed in November 2020. Per daughter, patient had blood work completed today as ordered by her PCP Dr. Ariel Sanchez however this has not yet been resulted. Vital signs stable in office today. Blood pressure 142/72, heart rate 69, pulse ox 97% on room air. Weight is 174 pounds compared to 178 pounds at last office visit on 11/16/2020.      11/16/20: Here for follow-up of systolic congestive heart failure. Was last seen in the office in August 2020. Since that time, she states she has been doing very well overall. Her daughter is with her during office visit today. She has no new or worsening heart failure symptoms.   Patient was reportedly out of her medications for about a week around a week ago and was not feeling that well but since resuming all of her meds she is back to her GFR low at 44.4, glucose 91  proBNP 7/9/2020: 2004 compared to as high as [id number] on 6/24/2020    Vital signs stable in office today. Blood pressure 149/77, heart rate 74, pulse ox 98% on room air. Weight is 178 pounds compared to 175 pounds at last visit on 7/9/2020. Past with patient and daughter regarding repeat echocardiogram to reevaluate LV function and mitral regurgitation. This will be scheduled no sooner than September 26, 2020 which is approx 3 months from her prior echo. 7/9/20 CHF clinic visit #1: This is a very pleasant 17-year-old  female who presents for initial CHF clinic evaluation following recent hospital admission for new onset A. fib RVR and new onset congestive heart failure. Echocardiogram completed during hospital admission revealed reduced LV systolic function with ejection fraction of 35 to 40% and 3-4+ MR with RVSP of 60 mmHg. She was initiated on IV diuresis with Lasix as well anticoagulation with full dose Lovenox and optimized on rate control and CHF medications. Given new onset congestive heart failure, mild troponin elevation, new onset A. fib and cardiomyopathy patient underwent cardiac catheterization on 6/26/2020 to rule out underlying cardiac ischemia. Cardiac catheterization revealed normal coronary arteries with EF of 45%. Also, patient underwent BEBE on 6/26/2020 which revealed 2+ MR, RVSP 48 mmHg and EF of 40%. Patient had initially converted back to sinus rhythm after initiation of IV Cardizem in the emergency room however she converted back to A. fib RVR during admission. Rate control medications were titrated and on 6/27/2020 she had converted back to sinus rhythm. Asymptomatically improved during the course of her admission. She was transitioned to oral anticoagulation with Xarelto at discharge. She was discharged home in stable condition on 6/27/2020.     Since discharge, patient states that she has been doing very well overall and has had 60 mm Hg. Right ventricular systolic pressure of 60 mm Hg consistent with moderate   pulmonary hypertension. The left atrium is Moderately dilated. Signature   ----------------------------------------------------------------   Electronically signed by Rhoda Bonds DO(Interpreting   physician) on 2020 05:56 PM    [facility] 20:  Conclusions  Procedure Summary  normal coronaries  normal LVF  PCWP 20mmHg  mod PHTN  normal C. I and C.O  no shunts. Recommendations  Aggressive risk factor management. Maximize medical therapy. Angiographic Findings   Cardiac Arteries and Lesion Findings  LMCA: Normal.  LAD: small caliber, tapers distally. LCx: Normal.  RCA: Normal.  Dominance: Right  LV function assessed as:Abnormal.  Ejection Fraction    - Method: LV gram. EF%: 45. BEBE 20:  Conclusions   Summary   Left ventricular ejection fraction is visually estimated at 40%. Left ventricular size is mildly increased . Moderate (2+) mitral regurgitation is present. No evidence of aortic valve regurgitation . There is mild tricuspid regurgitation with estimated RVSP of 48 mm Hg. No evidence of left to right shunt with color flow doppler or right to   left shunt with agitated saline contrast study. Moderately dilated LA. Recommendation   [facility] to follow.    Signature   ----------------------------------------------------------------   Electronically signed by Rhoda Bonds DO(Interpreting   physician) on 2020 09:26 AM      PMHx:  Newly diagnosed A-fib  Normal coronaries per [facility] 20  Moderate 2+ MR  Systolic CHF     PSHx:   x 3  Tonsillectomy  Hysterectomy    Allergies:  None    Social Hx:  No smoking  Social drinker       No Known Allergies    Current Outpatient Medications   Medication Sig Dispense Refill    rivaroxaban (XARELTO) 20 MG TABS tablet TAKE 1 TABLET BY MOUTH DAILY 14 tablet 0    sacubitril-valsartan (ENTRESTO) 24-26 MG per tablet Take 1 tablet by mouth 2 times daily 56 tablet 0    dilTIAZem (CARDIZEM CD) 120 MG extended release capsule Take 1 capsule by mouth daily 90 capsule 2    metoprolol tartrate (LOPRESSOR) 50 MG tablet Take 1 tablet by mouth 2 times daily 180 tablet 3    atorvastatin (LIPITOR) 20 MG tablet Take 1 tablet by mouth nightly 90 tablet 3    furosemide (LASIX) 20 MG tablet Take 1 tablet by mouth daily 180 tablet 3    acetaminophen (TYLENOL) 325 MG tablet Take 650 mg by mouth every 6 hours as needed.  oxybutynin (DITROPAN) 5 MG tablet Take 1 tablet by mouth 3 times daily. 270 tablet 1    diphenhydrAMINE (BENADRYL) 25 MG tablet Take 25 mg by mouth nightly as needed. For sleep       aspirin 81 MG EC tablet Take 81 mg by mouth daily.  multivitamin (ANTIOXIDANT;PROSIGHT) TABS per tablet Take 1 tablet by mouth daily. No current facility-administered medications for this visit.        Past Medical History:   Diagnosis Date    Atrial fibrillation ([facility]     Cardiomyopathy, nonischemic ([facility] 10/13/2020    Depression     Elevated glucose     HTN (hypertension)     Hyperlipidemia     Osteopenia     Hips    Pulmonary hypertension ([facility] 10/13/2020    Rheumatic heart disease     as a child    Rib fractures     Secondary to AA       Past Surgical History:   Procedure Laterality Date    APPENDECTOMY       SECTION      COLONOSCOPY      Internal hemorrhoids    COLONOSCOPY      normal    CT NEEDLE BIOPSY LUNG PERCUTANEOUS  2020    CT NEEDLE BIOPSY LUNG PERCUTANEOUS 2020 [facility] CT SCAN    EYE SURGERY      HYSTERECTOMY         Social History     Socioeconomic History    Marital status:      Spouse name: None    Number of children: None    Years of education: None    Highest education level: None   Occupational History    None   Tobacco Use    Smoking status: Never Smoker    Smokeless tobacco: Never Used   Vaping Use    Vaping Use: Never used   Substance and Sexual Activity    Alcohol use: No    Drug use: Never    Sexual activity: Not Currently     Partners: Male   Other Topics Concern    None   Social History Narrative    None     Social Determinants of Health     Financial Resource Strain:     Difficulty of Paying Living Expenses:    Food Insecurity:     Worried About Running Out of Food in the Last Year:     Ran Out of Food in the Last Year:    Transportation Needs:     Lack of Transportation (Medical):  Lack of Transportation (Non-Medical):    Physical Activity:     Days of Exercise per Week:     Minutes of Exercise per Session:    Stress:     Feeling of Stress :    Social Connections:     Frequency of Communication with Friends and Family:     Frequency of Social Gatherings with Friends and Family:     Attends [employer] Services:     Active Member of Clubs or Organizations:     Attends Club or Organization Meetings:     Marital Status:    Intimate Partner Violence:     Fear of Current or Ex-Partner:     Emotionally Abused:     Physically Abused:     Sexually Abused:        Family History   Problem Relation Age of Onset    Heart Disease Mother         Valvular heart disease from childhood rheumatic fever    Diabetes Maternal Aunt          Review of Systems:   Review of Systems   Constitutional: Negative for activity change, chills, fatigue and fever. HENT: Negative for congestion. Respiratory: Negative for cough, chest tightness and shortness of breath. Cardiovascular: Negative for chest pain, palpitations and leg swelling. No orthopnea or PND   Gastrointestinal: Negative for abdominal pain, blood in stool, nausea and vomiting. Genitourinary: Negative for difficulty urinating, dysuria and hematuria. Musculoskeletal: Negative for arthralgias and myalgias. Skin: Negative for color change, pallor and rash. Neurological: Negative for dizziness, syncope and light-headedness. Psychiatric/Behavioral: Negative for agitation and behavioral problems.          Physical Examination:    BP (!) 142/72 (Site: Right Lower Arm, Position: Sitting, Cuff Size: Small Adult)   Pulse 69   Resp 18   Ht 5' 1\" (1.549 m)   Wt 174 lb (78.9 kg)   SpO2 97%   BMI 32.88 kg/m²    Physical Exam  Constitutional:       General: She is not in acute distress. Appearance: Normal appearance. HENT:      Head: Normocephalic and atraumatic. Neck:      Vascular: No carotid bruit. Cardiovascular:      Rate and Rhythm: Normal rate. Rhythm irregular. Heart sounds: No murmur heard. Pulmonary:      Effort: Pulmonary effort is normal. No respiratory distress. Breath sounds: Normal breath sounds. No wheezing, rhonchi or rales. Abdominal:      Palpations: Abdomen is soft. Tenderness: There is no abdominal tenderness. Musculoskeletal:         General: Normal range of motion. Cervical back: Normal range of motion and neck supple. Right lower leg: No edema. Left lower leg: No edema. Skin:     General: Skin is warm and dry. Neurological:      General: No focal deficit present. Mental Status: She is alert and oriented to person, place, and time. Cranial Nerves: No cranial nerve deficit.    Psychiatric:         Mood and Affect: Mood normal.         Behavior: Behavior normal.         LABS:  CBC:   Lab Results   Component Value Date    WBC 9.6 05/19/2021    RBC 4.61 05/19/2021    HGB 15.1 05/19/2021    HCT 45.3 05/19/2021    MCV 98.1 05/19/2021    MCH 32.7 05/19/2021    MCHC 33.4 05/19/2021    RDW 13.8 05/19/2021     05/19/2021    MPV 9.7 07/09/2014     Lipids:  Lab Results   Component Value Date    CHOL 207 (H) 07/09/2014    CHOL 192 05/25/2012     Lab Results   Component Value Date    TRIG 101 07/09/2014    TRIG 117 05/25/2012     Lab Results   Component Value Date    HDL 69 (H) 07/09/2014    HDL 51 05/25/2012     Lab Results   Component Value Date    LDLCALC 118 07/09/2014    [address] 122 05/25/2012     No results found for: LABVLDL, VLDL  Lab Results Component Value Date    CHOLHDLRATIO 3.8 05/25/2012     CMP:    Lab Results   Component Value Date     11/16/2020    K 4.4 11/16/2020     11/16/2020    CO2 23 11/16/2020    BUN 25 11/16/2020    CREATININE 1.33 11/16/2020    GFRAA 45.9 11/16/2020    LABGLOM 37.9 11/16/2020    GLUCOSE 171 11/16/2020    GLUCOSE 123 05/25/2012    PROT 6.9 06/24/2020    LABALBU 3.6 06/24/2020    LABALBU 4.2 05/25/2012    CALCIUM 10.0 11/16/2020    BILITOT 0.3 06/24/2020    ALKPHOS 50 06/24/2020    AST 23 06/24/2020    ALT 41 06/24/2020     BMP:    Lab Results   Component Value Date     11/16/2020    K 4.4 11/16/2020     11/16/2020    CO2 23 11/16/2020    BUN 25 11/16/2020    LABALBU 3.6 06/24/2020    LABALBU 4.2 05/25/2012    CREATININE 1.33 11/16/2020    CALCIUM 10.0 11/16/2020    GFRAA 45.9 11/16/2020    LABGLOM 37.9 11/16/2020    GLUCOSE 171 11/16/2020    GLUCOSE 123 05/25/2012     Magnesium:    Lab Results   Component Value Date    MG 2.1 06/24/2020     TSH:  Lab Results   Component Value Date    TSH 1.400 06/24/2020     . result  No results for input(s): PROBNP in the last 72 hours. No results for input(s): INR in the last 72 hours. Patient Active Problem List   Diagnosis    HTN (hypertension)    Elevated glucose    Hyperlipidemia    Osteopenia    A-fib (HCC)    Lung nodule    Cardiomy[facility], nonischemic [facility]    Pulmonary hypertension ([facility]       Assessment/Plan:     Diagnosis Orders   1. Chronic diastolic CHF (congestive heart failure), NYHA class 1 (HCC)      stable   2. History of cardiomyopathy      Recovered LVF with EF 50% per echo 10/2/20. Normal coronaries per [facility] 6/26/20   3. PAF (paroxysmal atrial fibrillation) ([facility]      On [address] [place] [address] Road with xarelto   4. Mild mitral regurgitation      per echo 10/2/20   5. Moderate pulmonary arterial systolic hypertension (HCC)      RVSP 59mmHg per echo 10/2/20   6. Renal insufficiency      creatinine 1.33 with GFR 37.9 per BMP 11/16/21.  Await repeat labs completed today as ordered by PCP        -Maximize medical therapy--aspirin 81 mg p.o. daily, Lopressor 50 mg p.o. twice daily, Cardizem  mg p.o. daily, Entresto 24/26 mg p.o. twice daily, Lasix 20 mg p.o. daily, Lipitor 20 mg tablet p.o. daily at bedtime, OAC with Xarelto 20 mg p.o. daily (CrCl 62.9 as of 11/16/20 based on BMP from 8/17/20)  **If creatinine clearance is less than 51 in future, will need to consider decreasing dose of Xarelto to 15 mg p.o. daily from 20 mg daily  -Heart Failure appears compensated at this time. No need for further medication adjustments  -Await repeat labs completed today 5/19/2021 as ordered by PCP  If these labs do not include a BMP, will need to consider repeat BMP in near future to monitor stability and renal function electrolytes  -Cardiac/<2 gram sodium diet recommended  -2000mL daily fluid restriction   -Instructed patient to weigh self daily every morning upon waking and keep log book of daily weights. Notify office if gaining more than 3 pounds in 48 hours. -Advised patient to notify office immediately if experiencing any progressive SOB, orthopnea, PND, LE edema or weight gain  -Educated patient on importance of fluid and salt restriction as well as lifestyle modification  -Repeat echocardiogram completed 10/2/2020 revealed improvement in LV systolic function with EF now 50%, mild mitral valve regurgitation and moderate pulmonary hypertension with RVSP of 59 mmHg  -Maintain outpatient follow-up with pulmonology, Dr. Juliet Haas routine outpatient follow-up with general cardiologist, Dr. Cesario Aguilar schedule follow-up in 10/2021  -Maintain routine outpatient follow-up with PCP  -F/U with CHF clinic in 6 months or sooner if needed        Counseling: The importance of daily weights, dietary sodium restriction, and contact with cardiology if weight is increased more than 3 lbs in any 48 hour period was stressed.  The patient has been advised to contact us if theyexperience progressive SOB, orthopnea, PND or progressive edema.

## 2021-05-21 ENCOUNTER — HOSPITAL ENCOUNTER (OUTPATIENT)
Dept: CT IMAGING | Age: 86
Discharge: HOME | End: 2021-05-23
Payer: MEDICARE

## 2021-05-21 DIAGNOSIS — R41.3 MEMORY LOSS: ICD-10-CM

## 2021-05-21 PROCEDURE — 70450 CT HEAD/BRAIN W/O DYE: CPT

## 2021-05-25 ENCOUNTER — TELEPHONE (OUTPATIENT)
Dept: CARDIOLOGY CLINIC | Age: 86
End: 2021-05-25

## 2021-05-25 NOTE — TELEPHONE ENCOUNTER
Per Shannon Roman PA-C have patient decrease xarelto to 15mg daily given renal function. Creatinine clearance <51 based on recent labs and therefore renal dose indicated for this patient. Patient and daughter aware and verbalized understanding and new script sent to pharmacy. Also, daughter aware office will provide patient with samples of xarelto 15mg tablets.

## 2021-06-22 DIAGNOSIS — I42.8 CARDIOMYOPATHY, NONISCHEMIC (HCC): ICD-10-CM

## 2021-06-22 DIAGNOSIS — I50.22 CHRONIC SYSTOLIC CHF (CONGESTIVE HEART FAILURE), NYHA CLASS 1 (HCC): ICD-10-CM

## 2021-06-29 DIAGNOSIS — I48.0 PAROXYSMAL ATRIAL FIBRILLATION (HCC): ICD-10-CM

## 2021-06-30 RX ORDER — DILTIAZEM HYDROCHLORIDE 120 MG/1
120 CAPSULE, COATED, EXTENDED RELEASE ORAL DAILY
Qty: 90 CAPSULE | Refills: 3 | Status: SHIPPED | OUTPATIENT
Start: 2021-06-30 | End: 2021-12-13 | Stop reason: SDUPTHER

## 2021-08-02 DIAGNOSIS — I48.0 PAROXYSMAL ATRIAL FIBRILLATION (HCC): ICD-10-CM

## 2021-08-02 RX ORDER — METOPROLOL TARTRATE 50 MG/1
50 TABLET, FILM COATED ORAL 2 TIMES DAILY
Qty: 180 TABLET | Refills: 3 | Status: SHIPPED | OUTPATIENT
Start: 2021-08-02 | End: 2022-06-08

## 2021-09-19 DIAGNOSIS — E78.2 MIXED HYPERLIPIDEMIA: Primary | ICD-10-CM

## 2021-09-21 NOTE — TELEPHONE ENCOUNTER
Requesting medication refill. Please approve or deny this request.    Rx requested:  Requested Prescriptions     Pending Prescriptions Disp Refills    atorvastatin (LIPITOR) 20 MG tablet [Pharmacy Med Name: Atorvastatin Calcium 20 MG Oral Tablet] 90 tablet 3     Sig: TAKE 1 TABLET BY MOUTH AT  NIGHT         Last Office Visit:   10/13/2020      Next Visit Date:  Future Appointments   Date Time Provider Yuridia López   10/8/2021 12:00 PM David Wall,  [facility]   10/19/2021  4:00 PM Rubia Dodge MD [address]   5/19/2022  9:00 AM NANY Valadez APURVA [facility]               Last refill 8/19/20. Please approve or deny.

## 2021-09-22 RX ORDER — ATORVASTATIN CALCIUM 20 MG/1
TABLET, FILM COATED ORAL
Qty: 90 TABLET | Refills: 3 | Status: SHIPPED | OUTPATIENT
Start: 2021-09-22 | End: 2022-09-21

## 2021-10-08 ENCOUNTER — OFFICE VISIT (OUTPATIENT)
Dept: CARDIOLOGY CLINIC | Age: 86
End: 2021-10-08
Payer: MEDICARE

## 2021-10-08 VITALS
SYSTOLIC BLOOD PRESSURE: 156 MMHG | HEART RATE: 67 BPM | BODY MASS INDEX: 33.07 KG/M2 | DIASTOLIC BLOOD PRESSURE: 80 MMHG | OXYGEN SATURATION: 98 % | WEIGHT: 175 LBS | RESPIRATION RATE: 16 BRPM

## 2021-10-08 DIAGNOSIS — I42.8 CARDIOMYOPATHY, NONISCHEMIC (HCC): ICD-10-CM

## 2021-10-08 DIAGNOSIS — I10 PRIMARY HYPERTENSION: Primary | ICD-10-CM

## 2021-10-08 DIAGNOSIS — I48.0 PAROXYSMAL ATRIAL FIBRILLATION (HCC): ICD-10-CM

## 2021-10-08 PROCEDURE — 1036F TOBACCO NON-USER: CPT | Performed by: INTERNAL MEDICINE

## 2021-10-08 PROCEDURE — 4040F PNEUMOC VAC/ADMIN/RCVD: CPT | Performed by: INTERNAL MEDICINE

## 2021-10-08 PROCEDURE — 93000 ELECTROCARDIOGRAM COMPLETE: CPT | Performed by: INTERNAL MEDICINE

## 2021-10-08 PROCEDURE — G8417 CALC BMI ABV UP PARAM F/U: HCPCS | Performed by: INTERNAL MEDICINE

## 2021-10-08 PROCEDURE — G8427 DOCREV CUR MEDS BY ELIG CLIN: HCPCS | Performed by: INTERNAL MEDICINE

## 2021-10-08 PROCEDURE — G8484 FLU IMMUNIZE NO ADMIN: HCPCS | Performed by: INTERNAL MEDICINE

## 2021-10-08 PROCEDURE — 1090F PRES/ABSN URINE INCON ASSESS: CPT | Performed by: INTERNAL MEDICINE

## 2021-10-08 PROCEDURE — 99214 OFFICE O/P EST MOD 30 MIN: CPT | Performed by: INTERNAL MEDICINE

## 2021-10-08 PROCEDURE — 1123F ACP DISCUSS/DSCN MKR DOCD: CPT | Performed by: INTERNAL MEDICINE

## 2021-10-08 ASSESSMENT — ENCOUNTER SYMPTOMS
EYES NEGATIVE: 1
SHORTNESS OF BREATH: 1
WHEEZING: 0
GASTROINTESTINAL NEGATIVE: 1
ABDOMINAL PAIN: 0
ALLERGIC/IMMUNOLOGIC NEGATIVE: 1
NAUSEA: 0
VOMITING: 0

## 2021-10-08 NOTE — PROGRESS NOTES
10/8/2021      HPI:      20: : Patient is a 80 y.o. female who presents with a chief complaint of fatigue. Patient is followed on a regular basis by Dr. Smiley Rolle MD.  Patient with past medical history of hypertension and hyperlipidemia. Recently returned from [place] 2 weeks ago. She complains of not feeling well and fatiguing very easily. She also complains of shortness of breath with minimal exertion. She complains of right tibial leg pain. She denies any history of myocardial infarction, congestive heart failure or arrhythmia. She denies any history of stress test or cardiac catheterization. She denies history of diabetes or tobacco abuse. He was noted to be in new onset atrial fibrillation with rapid ventricle response and placed on IV Cardizem drip and has since spontaneously converted to normal sinus rhythm. CT of the chest shows no evidence of pulmonary believes him, mild cardiomegaly, coronary calcifications as well is reflux of contrast into the hepatic vein suggesting a degree of heart failure. There is small to moderate bilateral pleural effusions. Also noted is a left lower lobe infiltrate/pneumonia. Her second troponin is mildly elevated at 0.03. Blood pressure significant elevated on presentation at 159/102.    20: converted to NSR. S/p BEBE/LHC and RHC with normal coronaries, mod PHTN, mod MR and severe TR. denies any chest pain or shortness of breath at this time. He is on Lovenox subcu full dose and IV Lasix.     20: Echo with EF of 35-40%, severe TR and mod to severe MR, RVSP of 60mmHg. Doing ok today. Remains in afib with RVR on tele. 20  Vashti Aparicio (:  1935) has requested an audio/video evaluation for the following concern(s):    Status post hospitalization for acute decompensated heart failure and new onset atrial fibrillation. She states she is improving every day. She is feeling better.   Was placed on oral anticoagulation, Lopressor, Lipitor, Lasix p.o. Is on Cardizem  mg daily she was placed on Entresto. Pt denies chest pain, dyspnea, dyspnea on exertion, change in exercise capacity, fatigue,  nausea, vomiting, diarrhea, constipation, motor weakness, insomnia, weight loss, syncope, dizziness, lightheadedness, palpitations, PND, orthopnea, or claudication. He is compliant with her medications. No bleeding issues. 10-13-20: Post echocardiogram on October 2, 2020 with ejection fraction of 50%, improved as compared to echo on 6 of 2020. Moderate pulmonary retention with an RVSP 59 mmHg, mild mitral regurgitation. Patient is feeling well overall. She denies any symptoms of angina or congestive heart failure. She is compliant with her medications. She is on Xarelto 20 mg daily and denies any bleeding issues. No recent hospitalizations. She is hemodynamically stable today. She is following up with CHF clinic. EKG today with normal sinus rhythm, left bundle branch block, normal QTc interval.    10-8-21: Patient with history of recovered cardiomyopathy ejection fraction of 50%. History of cardiac catheterization in June 2020 with normal coronary arteries moderate pulmonary tension. She is follow-up with CHF clinic. No CHF type symptoms there is no anginal type symptoms. She is compliant with her medications. Patient with history of paroxysmal atrial fibrillation on oral anticoagulation. EKG today with atrial flutter    Review of Systems   Constitutional: Negative. Negative for chills and fever. HENT: Negative. Eyes: Negative. Respiratory: Positive for shortness of breath. Negative for wheezing. Cardiovascular: Negative for chest pain, palpitations and leg swelling. Gastrointestinal: Negative. Negative for abdominal pain, nausea and vomiting. Endocrine: Negative. Genitourinary: Negative. Musculoskeletal: Negative. Skin: Negative. Negative for rash. Allergic/Immunologic: Negative.     Neurological: Negative for dizziness, weakness and headaches. Psychiatric/Behavioral: Negative. Prior to Visit Medications    Medication Sig Taking? Authorizing Provider   atorvastatin (LIPITOR) 20 MG tablet TAKE 1 TABLET BY MOUTH AT  NIGHT Yes David Doshiiday, DO   metoprolol tartrate (LOPRESSOR) 50 MG tablet Take 1 tablet by mouth 2 times daily Yes David Wall, DO   dilTIAZem (CARDIZEM CD) 120 MG extended release capsule Take 1 capsule by mouth daily Yes David Wall, DO   sacubitril-valsartan (ENTRESTO) 24-26 MG per tablet Take 1 tablet by mouth 2 times daily Yes David Wall, DO   rivaroxaban (XARELTO) 15 MG TABS tablet Take 1 tablet by mouth daily (with breakfast) Yes NANY Bletran   furosemide (LASIX) 20 MG tablet Take 1 tablet by mouth daily Yes David Wall, DO   acetaminophen (TYLENOL) 325 MG tablet Take 650 mg by mouth every 6 hours as needed. Yes Historical Provider, MD   oxybutynin (DITROPAN) 5 MG tablet Take 1 tablet by mouth 3 times daily. Yes Roslyn Castro MD   diphenhydrAMINE (BENADRYL) 25 MG tablet Take 25 mg by mouth nightly as needed. For sleep  Yes Historical Provider, MD   aspirin 81 MG EC tablet Take 81 mg by mouth daily. Yes Historical Provider, MD   multivitamin (ANTIOXIDANT;PROSIGHT) TABS per tablet Take 1 tablet by mouth daily.    Yes Historical Provider, MD       Social History     Tobacco Use    Smoking status: Never Smoker    Smokeless tobacco: Never Used   Vaping Use    Vaping Use: Never used   Substance Use Topics    Alcohol use: No    Drug use: Never        No Known Allergies,   Past Medical History:   Diagnosis Date    Atrial fibrillation (HCC)     Cardiomyopathy, nonischemic ([facility] [address] 10/13/2020    Depression     Elevated glucose     HTN (hypertension)     Hyperlipidemia     Osteopenia     Hips    Pulmonary hypertension ([facility] [address] 10/13/2020    Rheumatic heart disease     as a child    Rib fractures     Secondary to AA   ,   Past Surgical History:   Procedure Laterality Date    APPENDECTOMY       SECTION      COLONOSCOPY      Internal hemorrhoids    COLONOSCOPY      normal    CT NEEDLE BIOPSY LUNG PERCUTANEOUS  2020    CT NEEDLE BIOPSY LUNG PERCUTANEOUS 2020 [facility] CT SCAN    EYE SURGERY      HYSTERECTOMY     ,   Family History   Problem Relation Age of Onset    Heart Disease Mother         Valvular heart disease from childhood rheumatic fever    Diabetes Maternal Aunt      Physical Exam  Constitutional:       General: She is not in acute distress. Appearance: Normal appearance. HENT:      Head: Normocephalic and atraumatic. Neck:      Musculoskeletal: Normal range of motion and neck supple. Vascular: No carotid bruit. Cardiovascular:      Rate and Rhythm: Normal rate and regular rhythm. Heart sounds: No murmur. Pulmonary:      Effort: Pulmonary effort is normal. No respiratory distress. Breath sounds: Normal breath sounds. No wheezing, rhonchi or rales. Abdominal:      Palpations: Abdomen is soft. Tenderness: There is no abdominal tenderness. Musculoskeletal: Normal range of motion. Right lower leg: No edema. Left lower leg: No edema. Skin:     General: Skin is warm and dry. Neurological:      General: No focal deficit present. Mental Status: She is alert and oriented to person, place, and time. Cranial Nerves: No cranial nerve deficit. Psychiatric:         Mood and Affect: Mood normal.         Behavior: Behavior normal.       ASSESSMENT:        Diagnosis Orders   1. Primary hypertension     2. Paroxysmal atrial fibrillation (HCC)  EKG 12 lead   3. Cardiomyopathy, nonischemic (HCC)  ECHO Complete 2D W Doppler W Color     1. New onset [id number] to normal sinus rhythm spontaneously. 2. Dyspnea secondary to acute decompensated combined heart failure  3. New onset nonischemic cardiomyopathy ejection fraction of 35 to 40%-recovered with ejection fraction of 50%.   5. Bilateral pleural effusions-resolved  6. HTN  7. Dyslipidemia  8. Obesity  9. JOSE ANTONIO lung nodule per CTA of the chest 6/24/2020  10. Abnormal EKG/LBBB  11. Mild mitral regurgitation  12. Moderate to severe pulmonary pretension, RVSP of 60 mmHg. Related to diastolic heart failure, valvular heart disease and cardiomyopathy. PLAN:    The nature of cardiac risk has been fully discussed with this patient. I have made her aware of her LDL target goal given her cardiovascular risk analysis. I have discussed the appropriate diet. The need for lifelong compliance in order to reduce risk is stressed. A regular exercise program is recommended to help achieve and maintain normal body weight, fitness and improve lipid balance. Patient was advised and encouraged to check blood pressure at home or at a pharmacy, maintain a logbook, and also call us back if blood pressure are above the target ranges or if it is low. Patient clearly understands and agrees to the instructions. We will need to continue to monitor muscle and liver enzymes, BUN, CR, and electrolytes. Cont with DOAC    Recheck echo in the next 6 to 12 months for LV function. Follow up with CHF clinic. Check EKG today and next office visit. Heart rate control approach. Patient does not sense she is in atrial fib/flutter. Heart rate is controlled. She is on oral anticoagulation. If develops any significant symptoms then will consider cardioversion/antiarrhythmic medications     The importance of daily weights, dietary sodium restriction, and contact with cardiology if weight is increased more than 3 lbs in any 48 hour period was stressed. The patient has been advised to contact us if they experience progressive SOB, orthopnea, PND or progressive edema. Details of medical condition explained and patient was warned about adverse consequences of uncontrolled medical conditions and possible side effects of prescribed medications.              No follow-ups on file. An  electronic signature was used to authenticate this note.     --[facility], DO on 10/8/2021 at 12:26 PM  9}

## 2021-10-19 DIAGNOSIS — I50.22 CHRONIC SYSTOLIC CHF (CONGESTIVE HEART FAILURE), NYHA CLASS 1 (HCC): ICD-10-CM

## 2021-10-19 DIAGNOSIS — I48.0 PAROXYSMAL ATRIAL FIBRILLATION (HCC): Primary | ICD-10-CM

## 2021-10-19 DIAGNOSIS — I42.8 CARDIOMYOPATHY, NONISCHEMIC (HCC): ICD-10-CM

## 2021-10-19 NOTE — TELEPHONE ENCOUNTER
Given:  Xarelto 15 mg # 4 bottle   Lot # [id number]  Exp. 02/24    Given:  Stefanie Garcia 24/26 mg # 1   Lot # [id number]  Exp. 08/2023

## 2021-10-21 ENCOUNTER — HOSPITAL ENCOUNTER (OUTPATIENT)
Dept: NON INVASIVE DIAGNOSTICS | Age: 86
Discharge: HOME | End: 2021-10-21
Payer: MEDICARE

## 2021-10-21 DIAGNOSIS — I42.8 CARDIOMYOPATHY, NONISCHEMIC (HCC): ICD-10-CM

## 2021-10-21 LAB
LV EF: 50 %
LVEF MODALITY: NORMAL

## 2021-10-21 PROCEDURE — 93306 TTE W/DOPPLER COMPLETE: CPT

## 2021-11-02 ENCOUNTER — HOSPITAL ENCOUNTER (OUTPATIENT)
Dept: CT IMAGING | Age: 86
Discharge: HOME | End: 2021-11-04
Payer: MEDICARE

## 2021-11-02 DIAGNOSIS — R91.1 LUNG NODULE: ICD-10-CM

## 2021-11-02 PROCEDURE — 71250 CT THORAX DX C-: CPT

## 2021-12-06 ENCOUNTER — TELEPHONE (OUTPATIENT)
Dept: CARDIOLOGY CLINIC | Age: 86
End: 2021-12-06

## 2021-12-06 DIAGNOSIS — I48.0 PAROXYSMAL ATRIAL FIBRILLATION (HCC): ICD-10-CM

## 2021-12-06 DIAGNOSIS — I50.22 CHRONIC SYSTOLIC CHF (CONGESTIVE HEART FAILURE), NYHA CLASS 1 (HCC): ICD-10-CM

## 2021-12-06 DIAGNOSIS — I42.8 CARDIOMYOPATHY, NONISCHEMIC (HCC): ICD-10-CM

## 2021-12-06 NOTE — TELEPHONE ENCOUNTER
Given:  Xarelto 15 mg # 4   Lot # [id number]  Exp. 03/2023    Given:  Entresto 24/26 mg # 1   Lot # [id number]  Exp. 12/2023    Samples ready for  lorain. Patient aware.

## 2021-12-06 NOTE — TELEPHONE ENCOUNTER
PATIENT CALLED OFFICE REQUESTING SAMPLES OF Alejo Chaves .  PLEASE CONTACT PATIENT WHEN SAMPLES READY OF

## 2021-12-13 DIAGNOSIS — I48.0 PAROXYSMAL ATRIAL FIBRILLATION (HCC): ICD-10-CM

## 2021-12-13 RX ORDER — DILTIAZEM HYDROCHLORIDE 120 MG/1
120 CAPSULE, COATED, EXTENDED RELEASE ORAL DAILY
Qty: 90 CAPSULE | Refills: 3 | Status: SHIPPED | OUTPATIENT
Start: 2021-12-13 | End: 2022-07-05

## 2021-12-13 NOTE — TELEPHONE ENCOUNTER
Please approve or deny this refill request. The order is pended. Thank you.     LOV 10/8/2021    Next Visit Date:  Future Appointments   Date Time Provider Yuridia Maribel   1/19/2022  2:00 PM Sera Sloan MD [address]   4/29/2022 11:45 AM DO Rajendra Silva   5/19/2022  9:00 AM NANY White

## 2021-12-20 ENCOUNTER — TELEPHONE (OUTPATIENT)
Dept: CARDIOLOGY CLINIC | Age: 86
End: 2021-12-20

## 2021-12-20 DIAGNOSIS — I42.8 CARDIOMYOPATHY, NONISCHEMIC (HCC): ICD-10-CM

## 2021-12-20 DIAGNOSIS — I50.22 CHRONIC SYSTOLIC CHF (CONGESTIVE HEART FAILURE), NYHA CLASS 1 (HCC): ICD-10-CM

## 2022-01-10 ENCOUNTER — TELEPHONE (OUTPATIENT)
Dept: CARDIOLOGY CLINIC | Age: 87
End: 2022-01-10

## 2022-01-17 DIAGNOSIS — I48.0 PAROXYSMAL ATRIAL FIBRILLATION (HCC): ICD-10-CM

## 2022-01-18 NOTE — TELEPHONE ENCOUNTER
LMOM NO SAMPLES AT THIS TIME AND TO TRY CALLING  OFFICE BACK IN A COUPLE WEEKS TO SEE IF WE HAVE ANY.

## 2022-01-19 ENCOUNTER — VIRTUAL VISIT (OUTPATIENT)
Dept: PULMONOLOGY | Age: 87
End: 2022-01-19
Payer: MEDICARE

## 2022-01-19 DIAGNOSIS — I27.20 PULMONARY HYPERTENSION (HCC): ICD-10-CM

## 2022-01-19 DIAGNOSIS — I50.22 CHRONIC SYSTOLIC CHF (CONGESTIVE HEART FAILURE), NYHA CLASS 1 (HCC): ICD-10-CM

## 2022-01-19 DIAGNOSIS — R91.1 LUNG NODULE: Primary | ICD-10-CM

## 2022-01-19 DIAGNOSIS — I42.8 CARDIOMYOPATHY, NONISCHEMIC (HCC): ICD-10-CM

## 2022-01-19 DIAGNOSIS — E66.09 CLASS 1 OBESITY DUE TO EXCESS CALORIES WITHOUT SERIOUS COMORBIDITY WITH BODY MASS INDEX (BMI) OF 32.0 TO 32.9 IN ADULT: ICD-10-CM

## 2022-01-19 PROCEDURE — 99442 PR PHYS/QHP TELEPHONE EVALUATION 11-20 MIN: CPT | Performed by: INTERNAL MEDICINE

## 2022-01-19 ASSESSMENT — ENCOUNTER SYMPTOMS
GASTROINTESTINAL NEGATIVE: 1
RESPIRATORY NEGATIVE: 1
ALLERGIC/IMMUNOLOGIC NEGATIVE: 1
EYES NEGATIVE: 1

## 2022-01-19 NOTE — TELEPHONE ENCOUNTER
Requesting medication refill. Please approve or deny this request.    Rx requested:  Requested Prescriptions     Pending Prescriptions Disp Refills    sacubitril-valsartan (ENTRESTO) 24-26 MG per tablet 28 tablet 0     Sig: Take 1 tablet by mouth 2 times daily         Last Office Visit:   10/8/2021      Next Visit Date:  Future Appointments   Date Time Provider Yuridia López   1/19/2022  2:00 PM Lisbeth Aden MD [address]   4/29/2022 11:45 AM David Brown DO [address] [place] [address]   5/19/2022  9:00 AM Tilford Cordial, [place] APURVA [facility] [place]               Last refill 12/20/21. Please approve or deny.

## 2022-01-19 NOTE — PROGRESS NOTES
2022    TELEHEALTH EVALUATION -- Audio/Visual (During [id number] public health emergency)    Due to Lb 19 outbreak, patient's office visit was converted to a virtual visit. Patient was contacted and agreed to proceed with a virtual visit via Telephone Visit  The risks and benefits of converting to a virtual visit were discussed in light of the current infectious disease epidemic. Patient also understood that insurance coverage and co-pays are up to their individual insurance plans. HPI:    Russell Arias (:  1935) has requested an audio/video evaluation for the following concern(s):    Spoke with the patient regarding CT chest follow-up, left upper lobe nodule stable, she is doing good, no shortness of breath, no coughing, no chest pain, no hemoptysis, weight is stable, no lower extremity edema, no fever no chills. She does not wish to do any sampling procedure now but she is willing to continue monitoring. Review of Systems   Constitutional: Negative. HENT: Negative. Eyes: Negative. Respiratory: Negative. Gastrointestinal: Negative. Musculoskeletal: Negative. Allergic/Immunologic: Negative. Neurological: Negative. Hematological: Negative. Psychiatric/Behavioral: Negative. Prior to Visit Medications    Medication Sig Taking?  Authorizing Provider   sacubitril-valsartan (ENTRESTO) 24-26 MG per tablet Take 1 tablet by mouth 2 times daily Yes David Wall, DO   dilTIAZem (CARDIZEM CD) 120 MG extended release capsule Take 1 capsule by mouth daily Yes Luis Tamayo, APRN - CNP   rivaroxaban (XARELTO) 15 MG TABS tablet Take 1 tablet by mouth daily (with breakfast) Yes David Wall, DO   atorvastatin (LIPITOR) 20 MG tablet TAKE 1 TABLET BY MOUTH AT  NIGHT Yes David Wall, DO   metoprolol tartrate (LOPRESSOR) 50 MG tablet Take 1 tablet by mouth 2 times daily Yes David Wall, DO   furosemide (LASIX) 20 MG tablet Take 1 tablet by mouth daily Yes David Wall, DO acetaminophen (TYLENOL) 325 MG tablet Take 650 mg by mouth every 6 hours as needed. Yes Historical Provider, MD   oxybutynin (DITROPAN) 5 MG tablet Take 1 tablet by mouth 3 times daily. Yes Shanti Nelson MD   aspirin 81 MG EC tablet Take 81 mg by mouth daily. Yes Historical Provider, MD   multivitamin (ANTIOXIDANT;PROSIGHT) TABS per tablet Take 1 tablet by mouth daily. Yes Historical Provider, MD   diphenhydrAMINE (BENADRYL) 25 MG tablet Take 25 mg by mouth nightly as needed.  For sleep   Patient not taking: Reported on 10/19/2021  Historical Provider, MD       Social History     Tobacco Use    Smoking status: Never Smoker    Smokeless tobacco: Never Used   Vaping Use    Vaping Use: Never used   Substance Use Topics    Alcohol use: No    Drug use: Never        No Known Allergies,   Past Medical History:   Diagnosis Date    Atrial fibrillation ([facility] [address]     Cardiomyopathy, nonischemic ([facility] [address] 10/13/2020    Depression     Elevated glucose     HTN (hypertension)     Hyperlipidemia     Osteopenia     Hips    Pulmonary hypertension ([facility] [address] 10/13/2020    Rheumatic heart disease     as a child    Rib fractures     Secondary to AA   ,   Past Surgical History:   Procedure Laterality Date    APPENDECTOMY       SECTION      COLONOSCOPY      Internal hemorrhoids    COLONOSCOPY      normal    CT NEEDLE BIOPSY LUNG PERCUTANEOUS  2020    CT NEEDLE BIOPSY LUNG PERCUTANEOUS 2020 [facility] CT SCAN    EYE SURGERY      HYSTERECTOMY     ,   Social History     Tobacco Use    Smoking status: Never Smoker    Smokeless tobacco: Never Used   Vaping Use    Vaping Use: Never used   Substance Use Topics    Alcohol use: No    Drug use: Never   ,   Family History   Problem Relation Age of Onset    Heart Disease Mother         Valvular heart disease from childhood rheumatic fever    Diabetes Maternal Aunt    ,   Immunization History   Administered Date(s) Administered    COVID-19, [employer] top, DILUTE for use, 12+ yrs, 30mcg/0.3mL dose 01/23/2021, 02/13/2021, 10/24/2021    Influenza 10/15/2012    Influenza Vaccine, unspecified formulation 11/04/2014    Influenza Virus Vaccine 12/07/2010    Influenza, High Dose (Fluzone 65 yrs and older) 11/11/2018    Pneumococcal Conjugate 13-valent ([id number]) 08/12/2015    Pneumococcal Polysaccharide ([id number]) 07/09/2013    Tdap (Boostrix, Adacel) 06/08/2016   ,   Health Maintenance   Topic Date Due    Depression Screen  Never done    Shingles Vaccine (1 of 2) Never done   [employer] Visit (AWV)  Never done    Lipid screen  05/19/2022    Potassium monitoring  05/19/2022    Creatinine monitoring  05/19/2022    DTaP/Tdap/Td vaccine (2 - Td or Tdap) 06/08/2026    Flu vaccine  Completed    Pneumococcal 65+ years Vaccine  Completed    COVID-19 Vaccine  Completed    Hepatitis A vaccine  Aged Out    Hepatitis B vaccine  Aged Out    Hib vaccine  Aged Out    Meningococcal (ACWY) vaccine  Aged Out           PHYSICAL EXAMINATION:  [ INSTRUCTIONS:  \"[x]\" Indicates a positive item  \"[]\" Indicates a negative item  -- DELETE ALL ITEMS NOT EXAMINED]  [x] Alert  [] Oriented to person/place/time    [] No apparent distress  [] Toxic appearing    [] Face flushed appearing [] Sclera clear  [] Lips are cyanotic      [] Breathing appears normal  [] Appears tachypneic      [] No rash on visible skin    [] Cranial Nerves II-XII grossly intact    [] Motor grossly intact in visible upper extremities    [] Motor grossly intact in visible lower extremities    [] Normal Mood  [] Anxious appearing    [] Depressed appearing  [] Confused appearing      [] Poor short term memory  [] Poor long term memory    [] OTHER:      Due to this being a TeleHealth encounter, evaluation of the following organ systems is limited: Vitals/Constitutional/EENT/Resp/CV/GI//MS/Neuro/Skin/Heme-Lymph-Imm.       Imaging studies CT chest November 2021, shows stable nodule in the left upper oral since June 2020  Labs reviewed   PFT August 2020, shows normal spirometry  ECHO October 2020, shows RVSP 59, EF 50%    ASSESSMENT/PLAN:  1. Lung nodule  · Patient does not wish to do any sampling procedure  · We will continue monitoring, repeat CT chest in 6  - CT CHEST WO CONTRAST; Future    2. Class 1 obesity due to excess calories without serious comorbidity with body mass index (BMI) of 32.0 to 32.9 in adult  Weight loss is    3. Pulmonary hypertension ([facility] [address]  Mainly group 2, continue cardioprotective medications and avoid volume overload      No follow-ups on file. Total time 12 minutes  An  electronic signature was used to authenticate this note. --Sarah Nails MD on 1/19/2022 at 2:12 PM        Pursuant to the emergency declaration under the [facility] waiver authority and the "[employer] and [employer] Act, this Virtual  Visit was conducted, with patient's consent, to reduce the patient's risk of exposure to COVID-19 and provide continuity of care for an established patient. Services were provided through a video synchronous discussion virtually to substitute for in-person clinic visit.

## 2022-04-18 ENCOUNTER — HOSPITAL ENCOUNTER (EMERGENCY)
Age: 87
Discharge: HOME | End: 2022-04-18
Payer: MEDICARE

## 2022-04-18 ENCOUNTER — APPOINTMENT (OUTPATIENT)
Dept: GENERAL RADIOLOGY | Age: 87
End: 2022-04-18
Payer: MEDICARE

## 2022-04-18 VITALS
TEMPERATURE: 96.1 F | WEIGHT: 170 LBS | HEART RATE: 78 BPM | SYSTOLIC BLOOD PRESSURE: 126 MMHG | BODY MASS INDEX: 32.1 KG/M2 | DIASTOLIC BLOOD PRESSURE: 84 MMHG | OXYGEN SATURATION: 94 % | HEIGHT: 61 IN | RESPIRATION RATE: 16 BRPM

## 2022-04-18 DIAGNOSIS — M79.672 LEFT FOOT PAIN: Primary | ICD-10-CM

## 2022-04-18 LAB
ALBUMIN SERPL-MCNC: 3.5 G/DL (ref 3.5–4.6)
ALP BLD-CCNC: 48 U/L (ref 40–130)
ALT SERPL-CCNC: 13 U/L (ref 0–33)
ANION GAP SERPL CALCULATED.3IONS-SCNC: 15 MEQ/L (ref 9–15)
AST SERPL-CCNC: 33 U/L (ref 0–35)
BACTERIA: ABNORMAL /HPF
BASOPHILS ABSOLUTE: 0.1 K/UL (ref 0–0.2)
BASOPHILS RELATIVE PERCENT: 1.1 %
BILIRUB SERPL-MCNC: 0.5 MG/DL (ref 0.2–0.7)
BILIRUBIN URINE: NEGATIVE
BLOOD, URINE: ABNORMAL
BUN BLDV-MCNC: 17 MG/DL (ref 8–23)
CALCIUM SERPL-MCNC: 9.1 MG/DL (ref 8.5–9.9)
CHLORIDE BLD-SCNC: 110 MEQ/L (ref 95–107)
CLARITY: ABNORMAL
CO2: 19 MEQ/L (ref 20–31)
COLOR: YELLOW
CREAT SERPL-MCNC: 0.97 MG/DL (ref 0.5–0.9)
EOSINOPHILS ABSOLUTE: 0.2 K/UL (ref 0–0.7)
EOSINOPHILS RELATIVE PERCENT: 1.5 %
EPITHELIAL CELLS, UA: ABNORMAL /HPF (ref 0–5)
GFR AFRICAN AMERICAN: >60
GFR NON-AFRICAN AMERICAN: 54.4
GLOBULIN: 3.8 G/DL (ref 2.3–3.5)
GLUCOSE BLD-MCNC: 234 MG/DL (ref 70–99)
GLUCOSE URINE: NEGATIVE MG/DL
HCT VFR BLD CALC: 41.9 % (ref 37–47)
HEMOGLOBIN: 13.6 G/DL (ref 12–16)
HYALINE CASTS: ABNORMAL /HPF (ref 0–5)
KETONES, URINE: ABNORMAL MG/DL
LEUKOCYTE ESTERASE, URINE: ABNORMAL
LYMPHOCYTES ABSOLUTE: 2 K/UL (ref 1–4.8)
LYMPHOCYTES RELATIVE PERCENT: 19.5 %
MCH RBC QN AUTO: 31.8 PG (ref 27–31.3)
MCHC RBC AUTO-ENTMCNC: 32.4 % (ref 33–37)
MCV RBC AUTO: 98.1 FL (ref 82–100)
MONOCYTES ABSOLUTE: 0.7 K/UL (ref 0.2–0.8)
MONOCYTES RELATIVE PERCENT: 6.5 %
NEUTROPHILS ABSOLUTE: 7.4 K/UL (ref 1.4–6.5)
NEUTROPHILS RELATIVE PERCENT: 71.4 %
NITRITE, URINE: POSITIVE
PDW BLD-RTO: 13.7 % (ref 11.5–14.5)
PH UA: 5.5 (ref 5–9)
PLATELET # BLD: 312 K/UL (ref 130–400)
POTASSIUM SERPL-SCNC: 5.1 MEQ/L (ref 3.4–4.9)
PROTEIN UA: 30 MG/DL
RBC # BLD: 4.27 M/UL (ref 4.2–5.4)
RBC UA: ABNORMAL /HPF (ref 0–2)
SODIUM BLD-SCNC: 144 MEQ/L (ref 135–144)
SPECIFIC GRAVITY UA: 1.03 (ref 1–1.03)
TOTAL PROTEIN: 7.3 G/DL (ref 6.3–8)
URIC ACID, SERUM: 5.7 MG/DL (ref 2.4–5.7)
URINE REFLEX TO CULTURE: YES
UROBILINOGEN, URINE: 1 E.U./DL
WBC # BLD: 10.3 K/UL (ref 4.8–10.8)
WBC UA: ABNORMAL /HPF (ref 0–5)

## 2022-04-18 PROCEDURE — 99284 EMERGENCY DEPT VISIT MOD MDM: CPT

## 2022-04-18 PROCEDURE — 85025 COMPLETE CBC W/AUTO DIFF WBC: CPT

## 2022-04-18 PROCEDURE — 2580000003 HC RX 258: Performed by: STUDENT IN AN ORGANIZED HEALTH CARE EDUCATION/TRAINING PROGRAM

## 2022-04-18 PROCEDURE — 96361 HYDRATE IV INFUSION ADD-ON: CPT

## 2022-04-18 PROCEDURE — 80053 COMPREHEN METABOLIC PANEL: CPT

## 2022-04-18 PROCEDURE — 96360 HYDRATION IV INFUSION INIT: CPT

## 2022-04-18 PROCEDURE — 36415 COLL VENOUS BLD VENIPUNCTURE: CPT

## 2022-04-18 PROCEDURE — 84550 ASSAY OF BLOOD/URIC ACID: CPT

## 2022-04-18 PROCEDURE — 87186 SC STD MICRODIL/AGAR DIL: CPT

## 2022-04-18 PROCEDURE — 87088 URINE BACTERIA CULTURE: CPT

## 2022-04-18 PROCEDURE — 81001 URINALYSIS AUTO W/SCOPE: CPT

## 2022-04-18 PROCEDURE — 73630 X-RAY EXAM OF FOOT: CPT

## 2022-04-18 PROCEDURE — 87086 URINE CULTURE/COLONY COUNT: CPT

## 2022-04-18 RX ORDER — 0.9 % SODIUM CHLORIDE 0.9 %
500 INTRAVENOUS SOLUTION INTRAVENOUS ONCE
Status: COMPLETED | OUTPATIENT
Start: 2022-04-18 | End: 2022-04-18

## 2022-04-18 RX ADMIN — SODIUM CHLORIDE 500 ML: 9 INJECTION, SOLUTION INTRAVENOUS at 09:47

## 2022-04-18 ASSESSMENT — ENCOUNTER SYMPTOMS
SORE THROAT: 0
CHEST TIGHTNESS: 0
DIARRHEA: 0
NAUSEA: 0
SHORTNESS OF BREATH: 0
BACK PAIN: 0
EYE PAIN: 0

## 2022-04-18 ASSESSMENT — PAIN DESCRIPTION - PAIN TYPE: TYPE: ACUTE PAIN

## 2022-04-18 ASSESSMENT — PAIN DESCRIPTION - LOCATION: LOCATION: FOOT

## 2022-04-18 ASSESSMENT — PAIN SCALES - GENERAL: PAINLEVEL_OUTOF10: 8

## 2022-04-18 ASSESSMENT — PAIN DESCRIPTION - ORIENTATION: ORIENTATION: LEFT

## 2022-04-18 ASSESSMENT — PAIN - FUNCTIONAL ASSESSMENT: PAIN_FUNCTIONAL_ASSESSMENT: 0-10

## 2022-04-18 NOTE — ED NOTES
Pt was asked for a urine sample and she said she still can't urinate.      Colten Verdin RN  04/18/22 [id number]

## 2022-04-18 NOTE — ED NOTES
Epic and most other systems went down, pt remained stable including VS, her left ankle was ace wrapped and she was discharged.      Daphne Giordano RN  04/18/22 1328

## 2022-04-18 NOTE — ED PROVIDER NOTES
[facility] ED  eMERGENCYdEPARTMENT eNCOUnter      Pt Name: Dangelo Sheth  MRN: [id number]  Meggfreynaldo 1935  Date of evaluation: 4/18/2022  Provider:Harpreet Perez PA-C    CHIEF COMPLAINT           HPI  Dangelo Sheth is a 80 y.o. female per chart review has a h/o hypertension, hyperlipidemia, A. fib, pulmonary hypertension presents with left foot pain after a fall. Patient states she does not member exactly how she fell, but states she knows she did not get lightheaded or faint. Daughter with her states her brother saw her trip on the way to the kitchen to get water. Patient reports sudden onset, severe, sharp, nonradiating pain to the mid left foot. Patient denies change in mental status, syncope, chest pain, shortness of breath, leg swelling. Patient is taking Xarelto. Patient denies hitting her head, back, hip. ROS  Review of Systems   Constitutional: Negative for chills, fatigue and fever. HENT: Negative for ear pain, hearing loss and sore throat. Eyes: Negative for pain and visual disturbance. Respiratory: Negative for chest tightness and shortness of breath. Cardiovascular: Negative for chest pain. Gastrointestinal: Negative for diarrhea and nausea. Endocrine: Negative for cold intolerance. Genitourinary: Negative for hematuria. Musculoskeletal: Negative for back pain. Left ankle pain   Skin: Negative for rash and wound. Neurological: Negative for dizziness and headaches. Psychiatric/Behavioral: Negative for behavioral problems and confusion. Except as noted above the remainder of the review of systems was reviewed and negative.        PAST MEDICAL HISTORY     Past Medical History:   Diagnosis Date    Atrial fibrillation ([address]     Cardiomyopathy, nonischemic ([address] 10/13/2020    Depression     Elevated glucose     HTN (hypertension)     Hyperlipidemia     Osteopenia     Hips    Pulmonary hypertension ([address] 10/13/2020    Rheumatic heart disease     as a child    Rib fractures     Secondary to AA         SURGICAL HISTORY       Past Surgical History:   Procedure Laterality Date    APPENDECTOMY       SECTION      COLONOSCOPY      Internal hemorrhoids    COLONOSCOPY      normal    CT NEEDLE BIOPSY LUNG PERCUTANEOUS  2020    CT NEEDLE BIOPSY LUNG PERCUTANEOUS 2020 [facility] CT SCAN    EYE SURGERY      HYSTERECTOMY           CURRENTMEDICATIONS       Discharge Medication List as of 2022 12:20 PM      CONTINUE these medications which have NOT CHANGED    Details   rivaroxaban (XARELTO) 15 MG TABS tablet Take 1 tablet by mouth daily (with breakfast), Disp-90 tablet, R-2Normal      sacubitril-valsartan (ENTRESTO) 24-26 MG per tablet Take 1 tablet by mouth 2 times daily, Disp-180 tablet, R-2Normal      dilTIAZem (CARDIZEM CD) 120 MG extended release capsule Take 1 capsule by mouth daily, Disp-90 capsule, R-3Normal      atorvastatin (LIPITOR) 20 MG tablet TAKE 1 TABLET BY MOUTH AT  NIGHT, Disp-90 tablet, R-3Requesting 1 year supplyNormal      metoprolol tartrate (LOPRESSOR) 50 MG tablet Take 1 tablet by mouth 2 times daily, Disp-180 tablet, R-3Normal      furosemide (LASIX) 20 MG tablet Take 1 tablet by mouth daily, Disp-180 tablet,R-3Normal      acetaminophen (TYLENOL) 325 MG tablet Take 650 mg by mouth every 6 hours as needed. oxybutynin (DITROPAN) 5 MG tablet Take 1 tablet by mouth 3 times daily. , Disp-270 tablet, R-1**Patient requests 90 day supply**      diphenhydrAMINE (BENADRYL) 25 MG tablet Take 25 mg by mouth nightly as needed. For sleep       aspirin 81 MG EC tablet Take 81 mg by mouth daily. multivitamin (ANTIOXIDANT;PROSIGHT) TABS per tablet Take 1 tablet by mouth daily.                ALLERGIES     Remeron [mirtazapine]    FAMILY HISTORY       Family History   Problem Relation Age of Onset    Heart Disease Mother         Valvular heart disease from childhood rheumatic fever    Diabetes Maternal Aunt SOCIAL HISTORY       Social History     Socioeconomic History    Marital status:      Spouse name: None    Number of children: None    Years of education: None    Highest education level: None   Occupational History    None   Tobacco Use    Smoking status: Never Smoker    Smokeless tobacco: Never Used   Vaping Use    Vaping Use: Never used   Substance and Sexual Activity    Alcohol use: No    Drug use: Never    Sexual activity: Not Currently     Partners: Male   Other Topics Concern    None   Social History Narrative    None     Social Determinants of Health     Financial Resource Strain:     Difficulty of Paying Living Expenses: Not on file   Food Insecurity:     Worried About Running Out of Food in the Last Year: Not on file    [place] of Food in the Last Year: Not on file   Transportation Needs:     Lack of Transportation (Medical): Not on file    Lack of Transportation (Non-Medical):  Not on file   Physical Activity:     Days of Exercise per Week: Not on file    Minutes of Exercise per Session: Not on file   Stress:     Feeling of Stress : Not on file   Social Connections:     Frequency of Communication with Friends and Family: Not on file    Frequency of Social Gatherings with Friends and Family: Not on file    Attends Congregational Services: Not on file    Active Member of [address] [place] [address] or Organizations: Not on file    Attends Club or Organization Meetings: Not on file    Marital Status: Not on file   Intimate Partner Violence:     Fear of Current or Ex-Partner: Not on file    Emotionally Abused: Not on file    Physically Abused: Not on file    Sexually Abused: Not on file   Housing Stability:     Unable to Pay for Housing in the Last Year: Not on file    Number of [place] in the Last Year: Not on file    Unstable Housing in the Last Year: Not on file         PHYSICAL EXAM       ED Triage Vitals [04/18/22 0919]   BP Temp Temp Source Pulse Resp SpO2 Height Weight 114/65 96.1 °F (35.6 °C) Temporal 82 18 93 % 5' 1\" (1.549 m) 170 lb (77.1 kg)       Physical Exam  Constitutional:       Appearance: Normal appearance. HENT:      Head: Normocephalic and atraumatic. Right Ear: External ear normal.      Left Ear: External ear normal.      Nose: Nose normal.      Mouth/Throat:      Mouth: Mucous membranes are moist.   Eyes:      Extraocular Movements: Extraocular movements intact. Conjunctiva/sclera: Conjunctivae normal.   Cardiovascular:      Rate and Rhythm: Normal rate and regular rhythm. Heart sounds: Normal heart sounds. Pulmonary:      Effort: Pulmonary effort is normal.      Breath sounds: Normal breath sounds. No stridor. No wheezing or rhonchi. Abdominal:      Palpations: Abdomen is soft. Tenderness: There is no abdominal tenderness. Musculoskeletal:         General: Normal range of motion. Cervical back: Normal range of motion and neck supple. No tenderness. Legs:       Comments: No redness or swelling noted in the left foot    Patient has no pain with full flexion and internal and external rotation of hip joints bilaterally. Patient has no back pain or neck pain palpation of the spine. Skin:     General: Skin is warm and dry. Neurological:      General: No focal deficit present. Mental Status: She is alert and oriented to person, place, and time. GCS: GCS eye subscore is 4. GCS verbal subscore is 5. GCS motor subscore is 6. Cranial Nerves: Cranial nerves are intact. Sensory: Sensation is intact. Motor: Motor function is intact. Coordination: Coordination is intact. Gait: Gait is intact. Deep Tendon Reflexes: Reflexes are normal and symmetric. Psychiatric:         Mood and Affect: Mood normal.         Behavior: Behavior normal.           MDM  This is an 78-year-old female presenting with left ankle pain. Patient is afebrile hemodynamically stable. Labs unremarkable.   Urine negative for UTI. Patient given 500 mL normal saline. Fluids given for request from family, as well as basic labs taken. X-ray left ankle negative for fracture. Uric acid, labs unremarkable. Unsure of cause of patients pain as now family is saying here foot pain has been ongoing for months, and is not related to her fall. Recommend podiatric f/u and return if symptoms change or worsen. Family agreeable. FINAL IMPRESSION      1.  Left foot pain          DISPOSITION/PLAN   DISPOSITION Decision To Discharge 04/18/2022 12:20:48 PM        DISCHARGE MEDICATIONS:  [unfilled]         Luisa Rose PA-C(electronically signed)  Attending Emergency Physician           Luisa Rose PA-C  04/18/22 1338

## 2022-04-20 LAB
ORGANISM: ABNORMAL
URINE CULTURE, ROUTINE: ABNORMAL
URINE CULTURE, ROUTINE: ABNORMAL

## 2022-05-10 ENCOUNTER — HOSPITAL ENCOUNTER (OUTPATIENT)
Dept: PHYSICAL THERAPY | Age: 87
Setting detail: THERAPIES SERIES
Discharge: HOME | End: 2022-05-10
Payer: MEDICARE

## 2022-05-10 PROCEDURE — 97162 PT EVAL MOD COMPLEX 30 MIN: CPT

## 2022-05-10 PROCEDURE — 97110 THERAPEUTIC EXERCISES: CPT

## 2022-05-10 ASSESSMENT — PAIN DESCRIPTION - DESCRIPTORS: DESCRIPTORS: ACHING

## 2022-05-10 ASSESSMENT — PAIN DESCRIPTION - LOCATION: LOCATION: ANKLE

## 2022-05-10 ASSESSMENT — PAIN DESCRIPTION - ORIENTATION: ORIENTATION: LEFT;OUTER

## 2022-05-10 ASSESSMENT — PAIN DESCRIPTION - FREQUENCY: FREQUENCY: INTERMITTENT

## 2022-05-10 ASSESSMENT — PAIN DESCRIPTION - DIRECTION: RADIATING_TOWARDS: DENIES

## 2022-05-10 ASSESSMENT — PAIN SCALES - GENERAL: PAINLEVEL_OUTOF10: 4

## 2022-05-10 NOTE — PROGRESS NOTES
[address]     Ph: [phone]  Fax: [phone]      [x] Certification  [] Recertification [x]  Plan of Care  [] Progress Note [] Discharge      Referring Provider: Amberly Henderson DPM  Referring Provider (secondary): none   From:  [address]  Patient: Jordana Romero (34 y.o. female) : 1935 Date: 05/10/2022   Medical Diagnosis: Peroneal tendinitis, left leg [M76.72] L peroneal tendinitis  Treatment Diagnosis: decreased L ankle AROM, decreased B LE strength, decreased balance, decreased activity tolerance and increased pain with walking and standing and decreased cognition including decreased memory    Plan of Care/Certification Expiration Date: : 22   Progress Report Period from:  5/10/2022  to 5/10/2022    Visits to Date: 1 No Show: 0 Cancelled Appts: 0    OBJECTIVE:   Short Term Goals - Time Frame for Short term goals: 3 wks    Goals Current/Discharge status  Status   Short term goal 1: Pt will demonstrate improved L ankle AROM to WNL for carryover to decreased pain with walking.   General AROM LE: Right WFL  AROM LLE (degrees)  L Ankle Dorsiflexion 0-20: 10  L Ankle Plantar Flexion 0-45: 30  L Ankle Forefoot Inversion 0-40: 30  L Ankle Forefoot Eversion 0-20: 10    New   Short term goal 2: Pt will demonstrate improved B LE strength >/= 4/5 for improved gait pattern  Strength LLE  L Hip Flexion: 4-/5  L Hip Extension: 4-/5  L Hip ABduction: 4-/5  L Knee Flexion: 4/5  L Knee Extension: 4/5  L Ankle Dorsiflexion: 4/5  L Ankle Plantar Flexion: 4/5 (unable to demonstrate standing heel raise)  L Ankle Inversion: 4-/5  L Ankle Eversion: 4-/5  Strength RLE  R Hip Flexion: 4-/5  R Hip Extension: 4-/5  R Hip ABduction: 4-/5  R Knee Extension: 4/5         Ankle General Strength Testing LE: Right WFL     New     Long Term Goals - Time Frame for Long term goals : 5 wks  Goals Current/ Discharge status Status   Long term goal 1: Pt will demonstrate indep and compliance with % of the time for self management of L ankle pain. HEP initiated New   Long term goal 2: Pt will demonstrate improved score on LEFS >/= 35/80 indicating minimal distability for improved pt quality of life. LEFS 31/80   New   Long term goal 3: The pt will report decreased L ankle pain </=1/10 at worst in order to increase ease with walking Pain Screening  Patient Currently in Pain: Yes  Pain Assessment: 0-10  Pain Level: 4  Best Pain Level: 0  Worst Pain Level: 4  Pain Location: Ankle  Pain Orientation: Left,Outer  Pain Radiating Towards: denies  Pain Descriptors: Aching  Pain Frequency: Intermittent  Aggravating factors: Walking New   Long term goal 4: Pt will demonstrate amb >/= 300ft indep without AD without evidence of antalgic pattern L LE for return to PLOF. antalgic pattern L LE; FF; Slow Catherine; Decreased step length; Decreased step height using SPC New   Long term goal 5: Pt will demonstrate improved score on Kan balance assessment >/= 40/56 for carryover to decreased risk for falls. Kan 27/56       Body Structures, Functions, Activity Limitations Requiring Skilled Therapeutic Intervention: Decreased functional mobility ,Decreased ROM,Decreased strength,Decreased cognition,Decreased endurance,Decreased balance,Increased pain  Assessment: Pt is 80 y.o. female to PT due to increased L lateral ankle pain of unknown cause. Pt to session wearing L ankle lace up brace and using SPC. Pt exhibits impairments including decreased L ankle AROM, decreased B LE strength, decreased balance, decreased activity tolerance and increased pain with walking and standing and decreased cognition including decreased memory. Pt requires continued PT to address these impairments, progress strength and ROM, and provide pt with HEP for self management of pain and weakness for carryover to improved quality of life.   Therapy Prognosis: Good      PT Education: Goals;PT Role;Plan of Care;Evaluative findings;Home Exercise Program;Gait Training  Patient Education: Pt educated to use cane in R UE with amb- pt stated understanding. Pt educated in safe wear schedule for L ankle brace and educated to increase activity at home- pt and pt's dtr stated understanding    PLAN: [x] Evaluate and Treat  Frequency/Duration:  Plan Frequency: 1  Plan weeks: 5  Current Treatment Recommendations: Strengthening,ROM,Balance training,Functional mobility training,Transfer training,Endurance training,Gait training,Stair training,Neuromuscular re-education,Manual Therapy - Soft Tissue Mobilization,Pain management,Home exercise program,Safety education & training,Patient/Caregiver education & training,Equipment evaluation, education, & procurement,Modalities,Positioning,Therapeutic activities  Plan Comment: freq is per pt preference- PT recommends 2xs/wk; pt ok to use ice L ankle    Precautions: falls                 Patient Status:[x] Continue/ Initiate plan of Care    [] Discharge PT. Recommend pt continue with HEP. [] Additional visits requested, Please re-certify for additional visits:    [] Hold         Signature: Electronically signed by Jenny Banuelos PT on 5/10/22 at 12:18 PM EDT      If you have any questions or concerns, please don't hesitate to call. Thank you for your referral.    I have reviewed this plan of care and certify a need for medically necessary rehabilitation services.     Physician Signature:__________________________________________________________  Date:  Please sign and return
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
## 2022-05-13 ENCOUNTER — OFFICE VISIT (OUTPATIENT)
Dept: CARDIOLOGY CLINIC | Age: 87
End: 2022-05-13
Payer: MEDICARE

## 2022-05-13 VITALS
HEART RATE: 55 BPM | BODY MASS INDEX: 31.64 KG/M2 | HEIGHT: 61 IN | DIASTOLIC BLOOD PRESSURE: 68 MMHG | OXYGEN SATURATION: 97 % | SYSTOLIC BLOOD PRESSURE: 116 MMHG | WEIGHT: 167.6 LBS | RESPIRATION RATE: 15 BRPM

## 2022-05-13 DIAGNOSIS — I27.20 PULMONARY HYPERTENSION (HCC): ICD-10-CM

## 2022-05-13 DIAGNOSIS — I42.8 CARDIOMYOPATHY, NONISCHEMIC (HCC): ICD-10-CM

## 2022-05-13 DIAGNOSIS — I10 PRIMARY HYPERTENSION: ICD-10-CM

## 2022-05-13 DIAGNOSIS — I48.0 PAROXYSMAL ATRIAL FIBRILLATION (HCC): Primary | ICD-10-CM

## 2022-05-13 DIAGNOSIS — E78.2 MIXED HYPERLIPIDEMIA: ICD-10-CM

## 2022-05-13 PROCEDURE — 93000 ELECTROCARDIOGRAM COMPLETE: CPT | Performed by: INTERNAL MEDICINE

## 2022-05-13 PROCEDURE — 4040F PNEUMOC VAC/ADMIN/RCVD: CPT | Performed by: INTERNAL MEDICINE

## 2022-05-13 PROCEDURE — 1123F ACP DISCUSS/DSCN MKR DOCD: CPT | Performed by: INTERNAL MEDICINE

## 2022-05-13 PROCEDURE — G8427 DOCREV CUR MEDS BY ELIG CLIN: HCPCS | Performed by: INTERNAL MEDICINE

## 2022-05-13 PROCEDURE — 1036F TOBACCO NON-USER: CPT | Performed by: INTERNAL MEDICINE

## 2022-05-13 PROCEDURE — 99214 OFFICE O/P EST MOD 30 MIN: CPT | Performed by: INTERNAL MEDICINE

## 2022-05-13 PROCEDURE — 1090F PRES/ABSN URINE INCON ASSESS: CPT | Performed by: INTERNAL MEDICINE

## 2022-05-13 PROCEDURE — G8417 CALC BMI ABV UP PARAM F/U: HCPCS | Performed by: INTERNAL MEDICINE

## 2022-05-13 ASSESSMENT — ENCOUNTER SYMPTOMS
SHORTNESS OF BREATH: 1
ABDOMINAL PAIN: 0
ALLERGIC/IMMUNOLOGIC NEGATIVE: 1
VOMITING: 0
WHEEZING: 0
NAUSEA: 0
GASTROINTESTINAL NEGATIVE: 1
EYES NEGATIVE: 1
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
## 2022-05-18 ENCOUNTER — HOSPITAL ENCOUNTER (OUTPATIENT)
Dept: PHYSICAL THERAPY | Age: 87
Setting detail: THERAPIES SERIES
Discharge: HOME | End: 2022-05-18
Payer: MEDICARE

## 2022-05-18 PROCEDURE — 97140 MANUAL THERAPY 1/> REGIONS: CPT

## 2022-05-18 PROCEDURE — 97110 THERAPEUTIC EXERCISES: CPT

## 2022-05-18 NOTE — PROGRESS NOTES
[facility]  Outpatient Physical Therapy    Treatment Note        Date: 2022  Patient: Benjamin Thompson  : 1935   Confirmed: Yes  MRN: [id number]  Referring Provider: Ban Guido DPM   Secondary Referring Provider (If applicable): none   Medical Diagnosis: Peroneal tendinitis, left leg [M76.72]    Treatment Diagnosis: decreased L ankle AROM, decreased B LE strength, decreased balance, decreased activity tolerance and increased pain with walking and standing and decreased cognition including decreased memory    Visit Information:  Insurance: Payor: Giselle Hensley / Plan: MEDICARE PART A AND B / Product Type: *No Product type* /   PT Visit Information  Onset Date:  (6 months ago)  Total # of Visits Approved: 99 (BMN)  Total # of Visits to Date: 2  Plan of Care/Certification Expiration Date: 22  No Show: 0  Progress Note Due Date: 22  Canceled Appointment: 0  Progress Note Counter: 2/10 (PN due 2022)    Subjective Information:  Subjective: Pt rports no pain in 4-5 days  HEP Compliance:  [x] Good [] Fair [] Poor [] Reports not doing due to:    Pain Screening  Patient Currently in Pain: Denies    Treatment:  Exercises:  Exercises  Exercise 1: seated march/ LAQ/ HR/ TR , knee flex YTB, ADD & ABD X 15  Exercise 2: ankle ROM circles L LE X 20 both directions  Exercise 4: bridge 3 sec x 10  Exercise 5: sink ex x 15  Exercise 13: NS L2 x 5 minutes  Exercise 20: HEP: Continue current, Seated Knee flex, ADD & ABD. Ankle TB, sink ex. Manual:   Manual Therapy  Soft Tissue Mobilizaton: Edema and STM 5 minutes     *Indicates exercise, modality, or manual techniques to be initiated when appropriate    Objective Measures:      Strength: [x] NT  [] MMT completed:     ROM: [x] NT  [] ROM measurements:         Assessment:    Body Structures, Functions, Activity Limitations Requiring Skilled Therapeutic Intervention: Decreased functional mobility ,Decreased ROM,Decreased strength,Decreased cognition,Decreased endurance,Decreased balance,Increased pain  Assessment: Continue to progress current exercises with focus on strengthening BLE's. Initiated multiple exercises without compliant of pain. Reports fatigue post session but no pain. Treatment Diagnosis: decreased L ankle AROM, decreased B LE strength, decreased balance, decreased activity tolerance and increased pain with walking and standing and decreased cognition including decreased memory             Post-Pain Assessment:       Pain Rating (0-10 pain scale):   0/10   Location and pain description same as pre-treatment unless indicated. Action: [] NA   [x] Perform HEP  [] Meds as prescribed  [] Modalities as prescribed   [] Call Physician     GOALS   Patient Goal(s): Patient goals : \"no pain\"    Short Term Goals Completed by 3 wks Goal Status   STG 1 Pt will demonstrate improved L ankle AROM to WNL for carryover to decreased pain with walking. In progress   STG 2 Pt will demonstrate improved B LE strength >/= 4/5 for improved gait pattern In progress       Long Term Goals Completed by 5 wks Goal Status   LTG 1 Pt will demonstrate indep and compliance with % of the time for self management of L ankle pain. LTG 2 Pt will demonstrate improved score on LEFS >/= 35/80 indicating minimal distability for improved pt quality of life. In progress   LTG 3 The pt will report decreased L ankle pain </=1/10 at worst in order to increase ease with walking In progress   LTG 4 Pt will demonstrate amb >/= 300ft indep without AD without evidence of antalgic pattern L LE for return to PLOF. In progress   LTG 5 Pt will demonstrate improved score on Kan balance assessment >/= 40/56 for carryover to decreased risk for falls.  In progress          Plan:  Frequency/Duration:  Plan  Plan Frequency: 1  Plan weeks: 5  Current Treatment Recommendations: Strengthening,ROM,Balance training,Functional mobility training,Transfer training,Endurance training,Gait training,Stair training,Neuromuscular re-education,Manual Therapy - Soft Tissue Mobilization,Pain management,Home exercise program,Safety education & training,Patient/Caregiver education & training,Equipment evaluation, education, & procurement,Modalities,Positioning,Therapeutic activities  Plan Comment: freq is per pt preference- PT recommends 2xs/wk; pt ok to use ice L ankle  Pt to continue current HEP. See objective section for any therapeutic exercise changes, additions or modifications this date.     Therapy Time:      PT Individual Minutes  Time In: 6286  Time Out: 8609  Minutes: 38  Timed Code Treatment Minutes: 38 Minutes  Procedure Minutes:0  Timed Activity Minutes Units   Ther Ex 33 2   Manual  5 1     Electronically signed by Yee Rossi PTA on 5/18/22 at 3:54 PM EDT

## 2022-05-24 ENCOUNTER — HOSPITAL ENCOUNTER (OUTPATIENT)
Dept: PHYSICAL THERAPY | Age: 87
Setting detail: THERAPIES SERIES
Discharge: HOME | End: 2022-05-24
Payer: MEDICARE

## 2022-05-24 PROCEDURE — 97110 THERAPEUTIC EXERCISES: CPT

## 2022-05-24 NOTE — PROGRESS NOTES
[facility]  Outpatient Physical Therapy    Treatment Note        Date: 2022  Patient: Tameka Bernabe  : 1935   Confirmed: Yes  MRN: [id number]  Referring Provider: Rosann Meckel, DPM   Secondary Referring Provider (If applicable): none   Medical Diagnosis: Peroneal tendinitis, left leg [M76.72]    Treatment Diagnosis: decreased L ankle AROM, decreased B LE strength, decreased balance, decreased activity tolerance and increased pain with walking and standing and decreased cognition including decreased memory    Visit Information:  Insurance: Payor: Cornelio Mariephillip / Plan: MEDICARE PART A AND B / Product Type: *No Product type* /   PT Visit Information  Onset Date:  (6 months ago)  Total # of Visits Approved: 80 (BMN)  Total # of Visits to Date: 3  Plan of Care/Certification Expiration Date: 22  No Show: 0  Progress Note Due Date: 22  Canceled Appointment: 0  Progress Note Counter: 3/10 (PN due 2022)    Subjective Information:  Subjective: Pt continues to report no pain. HEP Compliance:  [x] Good [] Fair [] Poor [] Reports not doing due to:    Pain Screening  Patient Currently in Pain: Denies    Treatment:  Exercises:  Exercises  Exercise 1: seated march/ LAQ/ HR/ TR , knee flex YTB, ADD & ABD X 20  Exercise 2: ankle ROM circles L LE X 20 both directions  Exercise 4: bridge 3 sec x 15  Exercise 5: sink ex x 20  Exercise 12: ankle AROM with TTB x 15*  Exercise 13: NS L4 x 5 minutes       Manual:   Manual Therapy  Soft Tissue Mobilizaton: Edema and STM 5 minutes       *Indicates exercise, modality, or manual techniques to be initiated when appropriate    Objective Measures:      Strength: [x] NT  [] MMT completed:     ROM: [x] NT  [] ROM measurements:     Assessment:    Body Structures, Functions, Activity Limitations Requiring Skilled Therapeutic Intervention: Decreased functional mobility ,Decreased ROM,Decreased strength,Decreased cognition,Decreased endurance,Decreased balance,Increased pain  Assessment: Continue to progress current exercises with focus on strengthening BLE's. Increased most exercises. Pt declined new exercises stating \"I think I'm done. ..don't want to do any more. \" Discussed progress and she stated she wants to make NV her last.  Reports fatigue post session but no pain. Treatment Diagnosis: decreased L ankle AROM, decreased B LE strength, decreased balance, decreased activity tolerance and increased pain with walking and standing and decreased cognition including decreased memory             Post-Pain Assessment:       Pain Rating (0-10 pain scale): 0  /10   Location and pain description same as pre-treatment unless indicated. Action: [] NA   [x] Perform HEP  [] Meds as prescribed  [] Modalities as prescribed   [] Call Physician     GOALS   Patient Goal(s): Patient goals : \"no pain\"    Short Term Goals Completed by 3 wks Goal Status   STG 1 Pt will demonstrate improved L ankle AROM to WNL for carryover to decreased pain with walking. In progress   STG 2 Pt will demonstrate improved B LE strength >/= 4/5 for improved gait pattern In progress       Long Term Goals Completed by 5 wks Goal Status   LTG 1 Pt will demonstrate indep and compliance with % of the time for self management of L ankle pain. LTG 2 Pt will demonstrate improved score on LEFS >/= 35/80 indicating minimal distability for improved pt quality of life. In progress   LTG 3 The pt will report decreased L ankle pain </=1/10 at worst in order to increase ease with walking In progress   LTG 4 Pt will demonstrate amb >/= 300ft indep without AD without evidence of antalgic pattern L LE for return to PLOF. In progress   LTG 5 Pt will demonstrate improved score on Kan balance assessment >/= 40/56 for carryover to decreased risk for falls.  In progress          Plan:  Frequency/Duration:  Plan  Plan Frequency: 1  Plan weeks: 5  Current Treatment Recommendations: Strengthening,ROM,Balance training,Functional mobility training,Transfer training,Endurance training,Gait training,Stair training,Neuromuscular re-education,Manual Therapy - Soft Tissue Mobilization,Pain management,Home exercise program,Safety education & training,Patient/Caregiver education & training,Equipment evaluation, education, & procurement,Modalities,Positioning,Therapeutic activities  Plan Comment: freq is per pt preference- PT recommends 2xs/wk; pt ok to use ice L ankle  Pt to continue current HEP. See objective section for any therapeutic exercise changes, additions or modifications this date.     Therapy Time:      PT Individual Minutes  Time In: 1120  Time Out: 5058  Minutes: 33  Timed Code Treatment Minutes: 33 Minutes  Procedure Minutes:0    Timed Activity Minutes Units   Ther Ex 33 2     Electronically signed by Libby Horowitz PTA on 5/24/22 at 12:22 PM EDT

## 2022-05-31 ENCOUNTER — HOSPITAL ENCOUNTER (OUTPATIENT)
Dept: PHYSICAL THERAPY | Age: 87
Setting detail: THERAPIES SERIES
Discharge: HOME | End: 2022-05-31
Payer: MEDICARE

## 2022-05-31 PROCEDURE — 97110 THERAPEUTIC EXERCISES: CPT

## 2022-05-31 NOTE — PROGRESS NOTES
[facility] [place]  Outpatient Physical Therapy    Treatment Note        Date: 2022  Patient: Darin Garza  : 1935   Confirmed: Yes  MRN: [id number]  Referring Provider: Frank Chavarria DPM   Secondary Referring Provider (If applicable): none   Medical Diagnosis: Peroneal tendinitis, left leg [M76.72]    Treatment Diagnosis: decreased L ankle AROM, decreased B LE strength, decreased balance, decreased activity tolerance and increased pain with walking and standing and decreased cognition including decreased memory    Visit Information:  Insurance: Payor: Tahmina Ashley / Plan: MEDICARE PART A AND B / Product Type: *No Product type* /   PT Visit Information  Onset Date:  (6 months ago)  Total # of Visits Approved: 80 (BMN)  Total # of Visits to Date: 4  Plan of Care/Certification Expiration Date: 22  No Show: 0  Progress Note Due Date: 22  Canceled Appointment: 0  Progress Note Counter: 4/10 (PN due 2022)    Subjective Information:  Subjective: Pt continues to report no pain. HEP Compliance:  [] Good [x] Fair [] Poor [] Reports not doing due to:    Pain Screening  Patient Currently in Pain: Denies    Treatment:  Exercises:  Exercises  Exercise 9: espana balance assessment  Exercise 10: Objective measurements.   Exercise 13: NS L4 x 5 minutes          *Indicates exercise, modality, or manual techniques to be initiated when appropriate    Objective Measures:        Strength: [] NT  [x] MMT completed:  Strength RLE  R Hip Flexion: 4+/5  R Hip Extension: 4+/5  R Hip ABduction: 4+/5  R Knee Extension: 4+/5  Strength LLE  L Hip Flexion: 4+/5  L Hip Extension: 4/5  L Hip ABduction: 4+/5  L Knee Flexion: 4/5,4+/5  L Knee Extension: 4/5,4+/5  L Ankle Dorsiflexion: 4+/5  L Ankle Plantar Flexion: 4+/5  L Ankle Inversion: 4/5  L Ankle Eversion: 4/5             ROM: [] NT  [x] ROM measurements:     AROM LLE (degrees)  L Ankle Dorsiflexion 0-20: 11  L Ankle Plantar Flexion 0-45: 41  L Ankle Forefoot Inversion 0-40: 34  L Ankle Forefoot Eversion 0-20: 18        Assessment: Body Structures, Functions, Activity Limitations Requiring Skilled Therapeutic Intervention: Decreased functional mobility ,Decreased ROM,Decreased strength,Decreased cognition,Decreased endurance,Decreased balance,Increased pain  Assessment: Pt demos good understanding of exercises but states fair followthrough. Strength and AROM improved from evaluation. No pain walking, decreased heel strike and lateral weightshifting. Treatment Diagnosis: decreased L ankle AROM, decreased B LE strength, decreased balance, decreased activity tolerance and increased pain with walking and standing and decreased cognition including decreased memory             Post-Pain Assessment:       Pain Rating (0-10 pain scale):   0/10   Location and pain description same as pre-treatment unless indicated. Action: [] NA   [x] Perform HEP  [] Meds as prescribed  [] Modalities as prescribed   [] Call Physician     GOALS   Patient Goal(s): Patient goals : \"no pain\"    Short Term Goals Completed by 3 wks Goal Status   STG 1 Pt will demonstrate improved L ankle AROM to WNL for carryover to decreased pain with walking. Met   STG 2 Pt will demonstrate improved B LE strength >/= 4/5 for improved gait pattern Met       Long Term Goals Completed by 5 wks Goal Status   LTG 1 Pt will demonstrate indep and compliance with % of the time for self management of L ankle pain. Partially met   LTG 2 Pt will demonstrate improved score on LEFS >/= 35/80 indicating minimal distability for improved pt quality of life. Met   LTG 3 The pt will report decreased L ankle pain </=1/10 at worst in order to increase ease with walking Met   LTG 4 Pt will demonstrate amb >/= 300ft indep without AD without evidence of antalgic pattern L LE for return to PLOF. Met   LTG 5 Pt will demonstrate improved score on Kan balance assessment >/= 40/56 for carryover to decreased risk for falls.  Met Plan:  Frequency/Duration:  Plan  Plan Comment: Discharged  Pt to continue current HEP. See objective section for any therapeutic exercise changes, additions or modifications this date.     Therapy Time:      PT Individual Minutes  Time In: 2564  Time Out: 9551  Minutes: 26  Timed Code Treatment Minutes: 26 Minutes  Procedure Minutes: O  Timed Activity Minutes Units   Ther Ex 26 2     Electronically signed by Gonzalez Ayala PTA on 5/31/22 at 11:57 AM EDT

## 2022-05-31 NOTE — PROGRESS NOTES
[address], [address]     Ph: [phone]  Fax: [phone]      [] Certification  [] Recertification []  Plan of Care  [] Progress Note [x] Discharge      Referring Provider: Ban Guido DPM  Referring Provider (secondary): none   From:  Lan Simon PT  Patient: Benjamin Thompson (88 y.o. female) : 1935 Date: 2022   Medical Diagnosis: Peroneal tendinitis, left leg [M76.72]    Treatment Diagnosis: decreased L ankle AROM, decreased B LE strength, decreased balance, decreased activity tolerance and increased pain with walking and standing and decreased cognition including decreased memory    Plan of Care/Certification Expiration Date: : 22   Progress Report Period from:  5/10/2022  to 2022    Visits to Date: 4 No Show: 0 Cancelled Appts: 0    OBJECTIVE:   Short Term Goals - Time Frame for Short term goals: 3 wks    Goals Current/Discharge status  Status   Short term goal 1: Pt will demonstrate improved L ankle AROM to WNL for carryover to decreased pain with walking. AROM LLE (degrees)  L Ankle Dorsiflexion 0-20: 11  L Ankle Plantar Flexion 0-45: 41  L Ankle Forefoot Inversion 0-40: 34  L Ankle Forefoot Eversion 0-20: 18 . No pain walking.  Met   Short term goal 2: Pt will demonstrate improved B LE strength >/= 4/5 for improved gait pattern  Strength RLE  R Hip Flexion: 4+/5  R Hip Extension: 4+/5  R Hip ABduction: 4+/5  R Knee Extension: 4+/5  Strength LLE  L Hip Flexion: 4+/5  L Hip Extension: 4/5  L Hip ABduction: 4+/5  L Knee Flexion: 4/5,4+/5  L Knee Extension: 4/5,4+/5  L Ankle Dorsiflexion: 4+/5  L Ankle Plantar Flexion: 4+/5  L Ankle Inversion: 4/5  L Ankle Eversion: 4/5 Met     Long Term Goals - Time Frame for Long term goals : 5 wks  Goals Current/ Discharge status Status   Long term goal 1: Pt will demonstrate indep and compliance with % of the time for self management of L ankle pain. Pt fair compliance with HEP. Partially met   Long term goal 2: Pt will demonstrate improved score on LEFS >/= 35/80 indicating minimal distability for improved pt quality of life. LEFS 59/80 Met   Long term goal 3: The pt will report decreased L ankle pain </=1/10 at worst in order to increase ease with walking No pain walking. Met   Long term goal 4: Pt will demonstrate amb >/= 300ft indep without AD without evidence of antalgic pattern L LE for return to PLOF. Pt amb without AD decrease lateral weight shift and heel strike. Met   Long term goal 5: Pt will demonstrate improved score on Watson balance assessment >/= 40/56 for carryover to decreased risk for falls. WATSON 47/56 Met     Body Structures, Functions, Activity Limitations Requiring Skilled Therapeutic Intervention: Decreased functional mobility ,Decreased ROM,Decreased strength,Decreased cognition,Decreased endurance,Decreased balance,Increased pain  Assessment: Pt demos good understanding of exercises but states fair followthrough. Strength and AROM improved from evaluation. No pain walking, decreased heel strike and lateral weightshifting. PLAN: [x]   Frequency/Duration:  Plan Comment: Discharged                            Patient Status:[] Continue/ Initiate plan of Care    [x] Discharge PT. Recommend pt continue with HEP. [] Additional visits requested, Please re-certify for additional visits:    [] Hold   Objective measurements done by       Signature: Electronically signed by Shilo Vega PTA on 5/31/22 at 12:06 PM EDT  Electronically signed by Alfa Prieto, PT on 6/14/2022 at 1:31 PM    If you have any questions or concerns, please don't hesitate to call. Thank you for your referral.    I have reviewed this plan of care and certify a need for medically necessary rehabilitation services.     Physician Signature:__________________________________________________________  Date:  Please sign and return

## 2022-06-04 DIAGNOSIS — I48.0 PAROXYSMAL ATRIAL FIBRILLATION (HCC): ICD-10-CM

## 2022-06-06 NOTE — TELEPHONE ENCOUNTER
Requesting medication refill. Please approve or deny this request.    Rx requested:  Requested Prescriptions     Pending Prescriptions Disp Refills    metoprolol tartrate (LOPRESSOR) 50 MG tablet [Pharmacy Med Name: Metoprolol Tartrate 50 MG Oral Tablet] 180 tablet 3     Sig: TAKE 1 TABLET BY MOUTH  TWICE DAILY         Last Office Visit:   5/13/2022      Next Visit Date:  Future Appointments   Date Time Provider Yuridia López   11/16/2022 10:30 AM NANY Bobby APURVA [facility] [place]   5/19/2023 12:30 PM David Turcios [facility]               Last refill 8/2/21. Please approve or deny.

## 2022-06-08 RX ORDER — METOPROLOL TARTRATE 50 MG/1
TABLET, FILM COATED ORAL
Qty: 180 TABLET | Refills: 3 | Status: SHIPPED | OUTPATIENT
Start: 2022-06-08

## 2022-06-30 ENCOUNTER — TELEPHONE (OUTPATIENT)
Dept: CARDIOLOGY CLINIC | Age: 87
End: 2022-06-30

## 2022-06-30 NOTE — TELEPHONE ENCOUNTER
Patient calling regards to getting samples of entresto 24-26.     Did let pt know we will call if we have them and when they are ready for

## 2022-07-03 DIAGNOSIS — I48.0 PAROXYSMAL ATRIAL FIBRILLATION (HCC): ICD-10-CM

## 2022-07-05 RX ORDER — DILTIAZEM HYDROCHLORIDE 120 MG/1
120 CAPSULE, COATED, EXTENDED RELEASE ORAL DAILY
Qty: 90 CAPSULE | Refills: 3 | Status: SHIPPED | OUTPATIENT
Start: 2022-07-05

## 2022-09-18 DIAGNOSIS — E78.2 MIXED HYPERLIPIDEMIA: ICD-10-CM

## 2022-09-20 NOTE — TELEPHONE ENCOUNTER
Please approve or deny this refill request. The order is pended. Thank you.     LOV 5/13/2022    Next Visit Date:  Future Appointments   Date Time Provider Yuridia López   11/16/2022 10:30 AM Gracie Boogie [address]   5/19/2023 12:30 PM David Blanchard [facility]

## 2022-09-21 RX ORDER — ATORVASTATIN CALCIUM 20 MG/1
TABLET, FILM COATED ORAL
Qty: 90 TABLET | Refills: 3 | Status: SHIPPED | OUTPATIENT
Start: 2022-09-21

## 2022-09-26 ENCOUNTER — TELEPHONE (OUTPATIENT)
Dept: CARDIOLOGY CLINIC | Age: 87
End: 2022-09-26

## 2022-09-26 NOTE — TELEPHONE ENCOUNTER
Pt daughter, Gerda Collazo calling for pt, requesting Entresto 24-26 Samples.     [facility] # [id number]

## 2022-09-27 NOTE — TELEPHONE ENCOUNTER
A sample bottle is available for PT pickup, LMOM on [facility] ([facility]) VM advising.      Entresto 24/26mg   Q: 28 tabs   Lot: [id number]  EXP: 10/2024

## 2022-10-05 ENCOUNTER — TELEPHONE (OUTPATIENT)
Dept: CARDIOLOGY CLINIC | Age: 87
End: 2022-10-05

## 2022-11-16 ENCOUNTER — OFFICE VISIT (OUTPATIENT)
Dept: CARDIOLOGY CLINIC | Age: 87
End: 2022-11-16
Payer: MEDICARE

## 2022-11-16 ENCOUNTER — TELEPHONE (OUTPATIENT)
Dept: CARDIOLOGY CLINIC | Age: 87
End: 2022-11-16

## 2022-11-16 VITALS
HEART RATE: 57 BPM | OXYGEN SATURATION: 99 % | BODY MASS INDEX: 31.06 KG/M2 | WEIGHT: 164.4 LBS | RESPIRATION RATE: 14 BRPM | SYSTOLIC BLOOD PRESSURE: 112 MMHG | DIASTOLIC BLOOD PRESSURE: 62 MMHG

## 2022-11-16 DIAGNOSIS — I48.0 PAROXYSMAL ATRIAL FIBRILLATION (HCC): ICD-10-CM

## 2022-11-16 DIAGNOSIS — N28.9 RENAL INSUFFICIENCY: ICD-10-CM

## 2022-11-16 DIAGNOSIS — I42.8 CARDIOMYOPATHY, NONISCHEMIC (HCC): ICD-10-CM

## 2022-11-16 DIAGNOSIS — I27.20 PULMONARY HYPERTENSION (HCC): ICD-10-CM

## 2022-11-16 DIAGNOSIS — R00.1 BRADYCARDIA: ICD-10-CM

## 2022-11-16 DIAGNOSIS — I50.32 CHRONIC DIASTOLIC CHF (CONGESTIVE HEART FAILURE), NYHA CLASS 1 (HCC): ICD-10-CM

## 2022-11-16 DIAGNOSIS — I38 VALVULAR HEART DISEASE: ICD-10-CM

## 2022-11-16 DIAGNOSIS — R53.83 FATIGUE, UNSPECIFIED TYPE: ICD-10-CM

## 2022-11-16 LAB
ANION GAP SERPL CALCULATED.3IONS-SCNC: 12 MEQ/L (ref 9–15)
BUN BLDV-MCNC: 26 MG/DL (ref 8–23)
CALCIUM SERPL-MCNC: 9.4 MG/DL (ref 8.5–9.9)
CHLORIDE BLD-SCNC: 105 MEQ/L (ref 95–107)
CO2: 22 MEQ/L (ref 20–31)
CREAT SERPL-MCNC: 1.22 MG/DL (ref 0.5–0.9)
GFR SERPL CREATININE-BSD FRML MDRD: 42.9 ML/MIN/{1.73_M2}
GLUCOSE BLD-MCNC: 105 MG/DL (ref 70–99)
POTASSIUM SERPL-SCNC: 4.7 MEQ/L (ref 3.4–4.9)
PRO-BNP: 1096 PG/ML
SODIUM BLD-SCNC: 139 MEQ/L (ref 135–144)

## 2022-11-16 PROCEDURE — G8484 FLU IMMUNIZE NO ADMIN: HCPCS | Performed by: PHYSICIAN ASSISTANT

## 2022-11-16 PROCEDURE — 99214 OFFICE O/P EST MOD 30 MIN: CPT | Performed by: PHYSICIAN ASSISTANT

## 2022-11-16 PROCEDURE — 1090F PRES/ABSN URINE INCON ASSESS: CPT | Performed by: PHYSICIAN ASSISTANT

## 2022-11-16 PROCEDURE — G8427 DOCREV CUR MEDS BY ELIG CLIN: HCPCS | Performed by: PHYSICIAN ASSISTANT

## 2022-11-16 PROCEDURE — 1036F TOBACCO NON-USER: CPT | Performed by: PHYSICIAN ASSISTANT

## 2022-11-16 PROCEDURE — 1123F ACP DISCUSS/DSCN MKR DOCD: CPT | Performed by: PHYSICIAN ASSISTANT

## 2022-11-16 PROCEDURE — G8417 CALC BMI ABV UP PARAM F/U: HCPCS | Performed by: PHYSICIAN ASSISTANT

## 2022-11-16 RX ORDER — ACETAMINOPHEN 160 MG
TABLET,DISINTEGRATING ORAL
COMMUNITY

## 2022-11-16 RX ORDER — DONEPEZIL HYDROCHLORIDE 10 MG/1
10 TABLET, FILM COATED ORAL NIGHTLY
COMMUNITY

## 2022-11-16 RX ORDER — SERTRALINE HYDROCHLORIDE 25 MG/1
25 TABLET, FILM COATED ORAL DAILY
COMMUNITY

## 2022-11-16 ASSESSMENT — ENCOUNTER SYMPTOMS
COLOR CHANGE: 0
VOMITING: 0
NAUSEA: 0
ABDOMINAL PAIN: 0
CHEST TIGHTNESS: 0
BLOOD IN STOOL: 0
SHORTNESS OF BREATH: 0
COUGH: 0

## 2022-11-16 NOTE — TELEPHONE ENCOUNTER
Per Lowry Olszewski, patient needs labs at earliest convenience to determine dose adjustment of xarelto

## 2022-11-16 NOTE — PROGRESS NOTES
Patient: Shay Fernandez  YOB: 1935  MRN: [id number]    Chief Complaint:  Chief Complaint   Patient presents with    1 Year Follow Up    Cardiomyopathy    Hypertension         Subjective/HPI        11/16/22: Here for follow-up of chronic congestive heart failure and recovered nonischemic cardiomyopathy. Patient was last seen in CHF clinic on 5/19/2021. She is accompanied by her  in office today. She reports doing well overall since her last office visit. She does complain of low energy and fatigue and tiredness reporting that she used to be very active but within the past couple years is mostly sedentary. She denies any shortness of breath or dyspnea exertion, chest pain, palpitations, diaphoresis, paroxysmal nocturnal dyspnea, orthopnea, lower extremity edema, dizziness, lightheadedness, syncope, fever or chills. She is compliant with low-sodium diet and fluid restriction. She is compliant with all of her medications. She is on oral anticoagulation with Xarelto and denies any bleeding issues. Given patient's complaints of fatigue, tiredness and low energy as well as bradycardia noted on EKG from May 2022 and bradycardia on vitals today in office, I discussed with patient and  regarding cardiac monitor to further evaluate for underlying bradycardia arrhythmias/sick sinus syndrome. She does have a history of paroxysmal atrial fibrillation as well. Bradycardia could be contributing to patient's fatigue and tiredness although history of depression also likely plays a part. Patient and  agreeable to cardiac monitor so Zio patch will be ordered. Most recent diagnostic testing reviewed and documented below. EKG 5/13/22: SB 46, frequent PACs, QTc 454ms    Echocardiogram 10/21/21:  Conclusions   Summary   MV Leaflet thick   1-2+ MR   Normal tricuspid valve structure and function. Mild TR   RVSP 40 mmHg   Normal left ventricle structure and function.    Left ventricular ejection fraction is visually estimated at 50%. Pseudonormal filling pattern noted. Moderately dilated left atrium. Signature   ----------------------------------------------------------------   Electronically signed by Monica Jeffries MD(Interpreting   physician) on 10/21/2021 11:07 AM   ----------------------------------------------------------------    Most recent labs reviewed and documented below. CMP on 4/18/2022: Sodium 144, potassium elevated 5.1 but hemolysis is exceeded interference, chloride 110, total CO2 of 19, BUN 17, creatinine elevated 0.97, GFR low at 54.4, glucose elevated 234. ALT 13, AST 33  CBC 4/18/2022: WBC 10.3, hemoglobin 13.6, hematocrit 41.9, platelets 670  TSH on 5/19/2021: 2.490  Lipid panel 5/19/2021: Total cholesterol 120, triglycerides 97, HDL 58, LDL 43    Vital signs stable in office today. Blood pressure 112/62, heart rate 57, SPO2 99% on room air. Weight of 164.4 pounds compared to 174 pounds at last visit on 5/19/2022. 5/19/21: Patient here for follow-up of chronic congestive heart failure and history of nonischemic cardiomyopathy with recovered LV function per echo 10/20/2020. Patient was last seen in the office on 11/16/2020 and since that time has been doing well with no new or worsening heart failure symptoms. She is accompanied by her daughter today in office. She is compliant with all of her medications and with low-sodium diet and fluid restriction. She does not check her weight daily but when she does she has not noticed any significant weight gain. She is actually lost 4 pounds since her last office visit with me in November. She denies shortness of breath or dyspnea on exertion, chest pain, palpitations, diaphoresis, paroxysmal nocturnal dyspnea, orthopnea, lower extremity edema, syncope, fever or chills. She is on oral anticoagulation with Xarelto and denies any bleeding issues.       Last blood work on record was completed in November 2020.  Per daughter, patient had blood work completed today as ordered by her PCP Dr. Radha Rossi however this has not yet been resulted. Vital signs stable in office today. Blood pressure 142/72, heart rate 69, pulse ox 97% on room air. Weight is 174 pounds compared to 178 pounds at last office visit on 11/16/2020.      11/16/20: Here for follow-up of systolic congestive heart failure. Was last seen in the office in August 2020. Since that time, she states she has been doing very well overall. Her daughter is with her during office visit today. She has no new or worsening heart failure symptoms. Patient was reportedly out of her medications for about a week around a week ago and was not feeling that well but since resuming all of her meds she is back to her baseline. She has a history of a nonischemic cardiomyopathy with EF of 35 to 40% per echo in June 2020. She recently had repeat echocardiogram completed on 10/2/2020 which revealed improvement in LV systolic function with EF now 50%, mild 1+ mitral valve regurgitation, moderately elevated RVSP at 59 mmHg. She is compliant with low-sodium diet and fluid restriction. Per patient's daughter, patient does not weigh herself every morning but no weight gain reported. She is on oral anticoagulation with Xarelto for A. fib and denies any bleeding issues. She denies chest pain, palpitations, diaphoresis, shortness of breath or dyspnea on exertion, nausea, vomiting, dizziness, lightheadedness, paroxysmal nocturnal dyspnea, orthopnea, lower extremity edema, syncope, fever or chills. Patient follows with pulmonology and completed repeat CT of the chest on 8/3/2020 for evaluation of left upper lobe lung nodules. This CT revealed persistent cluster of noncalcified nodules within the anterior superior aspect of the left upper lobe. She had follow-up office visit with pulmonology on 9/15/2020 and was referred to interventional radiology for CT-guided lung biopsy. She underwent CT-guided lung biopsy on 11/2/2020 and has follow-up appointment tomorrow 11/17/2020 with Dr. Kinza Rhodes to discuss pathology results. EKG 10/13/20:  SR 62, PACs, ST depression leads I, aVL, QTc 464ms    Echocardiogram 10/2/20:   Conclusions   Summary   Normal left ventricular systolic function, no regional wall motion   abnormalities, estimated ejection fraction of 50%. improved as compared to   previous echo on 6/2020   Normal left ventricular size and function. Normal left ventricular wall thickness. Mild (1+) mitral regurgitation is present. No evidence of mitral valve stenosis. No evidence of aortic valve regurgitation . No evidence of aortic valve stenosis. The left atrium is Mild to moderate dilated. There is mild tricuspid regurgitation with estimated RVSP of 59 mm Hg. Right ventricular systolic pressure of 59 mm Hg consistent with moderate   pulmonary hypertension. Signature   ----------------------------------------------------------------   Electronically signed by Cristóbal Garvey DO(Interpreting   physician) on 10/02/2020 05:39 PM    Most recent labs reviewed and documented below. BMP 8/17/2020: Sodium 141, potassium 4.1, chloride 107, total CO2 23, BUN elevated 26, creatinine elevated 0.98, GFR low at 53.9, glucose 145  proBNP 8/17/2020: 902 compared to 2004 on 7/9/2020    Vital signs stable in office today. Blood pressure 112/76, heart rate 82, pulse ox 97% on room air. Weight is 178 pounds which is the exact same weight she was on 8/17/2020.        8/17/20: Here for follow-up of systolic congestive heart failure and nonischemic cardiomyopathy. Was last seen in office on 7/9/2020 and since that time is been doing very well overall. She denies any new or worsening CHF symptoms. She is compliant with all of her medications.   She does report taking Lopressor only 50 mg once a day instead of twice daily and has been advised to go home and clarify exact medication in frequency is listed on medication bottle and call us back. Daughter accompanies her in office today. She denies any complaint of chest pain, shortness of breath or significant dyspnea on exertion, nausea, vomiting, dizziness, lightheadedness, diaphoresis, palpitations, paroxysmal nocturnal dyspnea, orthopnea, worsening lower extremity edema, syncope, fever or chills. She is on oral anticoagulation with Xarelto and denies any bleeding issues. She is adherent to low-sodium diet and fluid restriction but states that she does eat a significant amount of cheese which she has been cautioned to limit. Most recent labs reviewed and documented below. BMP 7/9/2020: Sodium 143, potassium 4.1, chloride 103, total CO2 30, BUN elevated 24, creatinine elevated 1.16, GFR low at 44.4, glucose 91  proBNP 7/9/2020: 2004 compared to as high as 4654 on 6/24/2020    Vital signs stable in office today. Blood pressure 149/77, heart rate 74, pulse ox 98% on room air. Weight is 178 pounds compared to 175 pounds at last visit on 7/9/2020. Past with patient and daughter regarding repeat echocardiogram to reevaluate LV function and mitral regurgitation. This will be scheduled no sooner than September 26, 2020 which is approx 3 months from her prior echo. 7/9/20 CHF clinic visit #1: This is a very pleasant 80-year-old  female who presents for initial CHF clinic evaluation following recent hospital admission for new onset A. fib RVR and new onset congestive heart failure. Echocardiogram completed during hospital admission revealed reduced LV systolic function with ejection fraction of 35 to 40% and 3-4+ MR with RVSP of 60 mmHg. She was initiated on IV diuresis with Lasix as well anticoagulation with full dose Lovenox and optimized on rate control and CHF medications.   Given new onset congestive heart failure, mild troponin elevation, new onset A. fib and cardiomyopathy patient underwent cardiac catheterization on 6/26/2020 to rule out underlying cardiac ischemia. Cardiac catheterization revealed normal coronary arteries with EF of 45%. Also, patient underwent BEBE on 6/26/2020 which revealed 2+ MR, RVSP 48 mmHg and EF of 40%. Patient had initially converted back to sinus rhythm after initiation of IV Cardizem in the emergency room however she converted back to A. fib RVR during admission. Rate control medications were titrated and on 6/27/2020 she had converted back to sinus rhythm. Asymptomatically improved during the course of her admission. She was transitioned to oral anticoagulation with Xarelto at discharge. She was discharged home in stable condition on 6/27/2020. Since discharge, patient states that she has been doing very well overall and has had continued improvement in her shortness of breath. She will still experience shortness of breath with moderate exertion such as when walking up a flight of steps but otherwise is able to carry out her normal activities without any trouble breathing. She admits to being sedentary recently not feeling like doing many of the normal activities that she used to. Denies chest pain, palpitations, diaphoresis, paroxysmal nocturnal dyspnea, orthopnea, lower extremity edema, syncope, cough, fever or chills. Denies any difficulty urinating or any painful urination. She denies any bleeding issues on Xarelto including hematochezia or melena. He is adherent to a low-sodium diet as well as fluid restriction. She has been weighing herself on a daily basis and weight at home this morning was 175 pounds compared to 182 pounds on day of discharge from the hospital.    EKG in office today shows sinus rhythm with ventricular rate of 63 bpm, ST depression in leads I and aVL and poor R wave progression in V2 through V6,  ms. Recent labs reviewed and documented below.   BMP 6/27/2020: Sodium 142, potassium 3.8, chloride 106, total CO2 24, BUN 12, creatinine 1.11, GFR 46.7, glucose 120  CBC 6/26/2020: WBC 10.3, hemoglobin 14.9, hematocrit 44.8, platelets 682  proBNP 6/24/2020: 4654  TSH 6/24/2020: 1.40      Vital signs stable in office today with blood pressure 118/82, heart rate 64, pulse ox 98% on room air. Weight is 175 pounds      Echocardiogram 6/24/20:  Conclusions   Summary   Left ventricular ejection fraction is visually estimated at 35-40%. global   hypokinesis. difficult to estimate due to afib. Left ventricular size is normal .   Pseudonormal filling pattern noted. Moderately severe (3-4+) mitral regurgitation is present. No evidence of mitral valve stenosis. No evidence of aortic valve regurgitation . No evidence of aortic valve stenosis. There is severe tricuspid regurgitation with estimated RVSP of 60 mm Hg. Right ventricular systolic pressure of 60 mm Hg consistent with moderate   pulmonary hypertension. The left atrium is Moderately dilated. Signature   ----------------------------------------------------------------   Electronically signed by Eloise Malone DO(Interpreting   physician) on 06/25/2020 05:56 PM    [facility] 6/26/20:  Conclusions  Procedure Summary  normal coronaries  normal LVF  PCWP 20mmHg  mod PHTN  normal C. I and C.O  no shunts. Recommendations  Aggressive risk factor management. Maximize medical therapy. Angiographic Findings   Cardiac Arteries and Lesion Findings  LMCA: Normal.  LAD: small caliber, tapers distally. LCx: Normal.  RCA: Normal.  Dominance: Right  LV function assessed as:Abnormal.  Ejection Fraction    - Method: LV gram. EF%: 45. BEBE 6/26/20:  Conclusions   Summary   Left ventricular ejection fraction is visually estimated at 40%. Left ventricular size is mildly increased . Moderate (2+) mitral regurgitation is present. No evidence of aortic valve regurgitation . There is mild tricuspid regurgitation with estimated RVSP of 48 mm Hg.    No evidence of left to right shunt with color flow doppler or right to left shunt with agitated saline contrast study. Moderately dilated LA. Recommendation   [facility] to follow. Signature   ----------------------------------------------------------------   Electronically signed by Devon Riley DO(Interpreting   physician) on 2020 09:26 AM      PMHx:  Newly diagnosed A-fib  Normal coronaries per 5 [facility] 20  Moderate 2+ MR  Systolic CHF     PSHx:   x 3  Tonsillectomy  Hysterectomy    Allergies:  None    Social Hx:  No smoking  Social drinker       Allergies   Allergen Reactions    Remeron [Mirtazapine] Hallucinations       Current Outpatient Medications   Medication Sig Dispense Refill    donepezil (ARICEPT) 10 MG tablet 10 mg nightly      sertraline (ZOLOFT) 25 MG tablet Take 25 mg by mouth daily      Cholecalciferol (VITAMIN D3) 50 MCG (2000) CAPS Take by mouth      atorvastatin (LIPITOR) 20 MG tablet TAKE 1 TABLET BY MOUTH AT  NIGHT 90 tablet 3    dilTIAZem (CARDIZEM CD) 120 MG extended release capsule TAKE 1 CAPSULE BY MOUTH  DAILY 90 capsule 3    metoprolol tartrate (LOPRESSOR) 50 MG tablet TAKE 1 TABLET BY MOUTH  TWICE DAILY 180 tablet 3    rivaroxaban (XARELTO) 15 MG TABS tablet Take 1 tablet by mouth daily (with breakfast) 90 tablet 2    sacubitril-valsartan (ENTRESTO) 24-26 MG per tablet Take 1 tablet by mouth 2 times daily 180 tablet 2    furosemide (LASIX) 20 MG tablet Take 1 tablet by mouth daily 180 tablet 3    acetaminophen (TYLENOL) 325 MG tablet Take 650 mg by mouth every 6 hours as needed. oxybutynin (DITROPAN) 5 MG tablet Take 1 tablet by mouth 3 times daily. 270 tablet 1    diphenhydrAMINE (BENADRYL) 25 MG tablet Take 25 mg by mouth nightly as needed For sleep      aspirin 81 MG EC tablet Take 81 mg by mouth daily. multivitamin (ANTIOXIDANT;PROSIGHT) TABS per tablet Take 1 tablet by mouth daily. No current facility-administered medications for this visit.        Past Medical History:   Diagnosis Date    Atrial fibrillation ([facility] [address]     Cardiomyopathy, nonischemic ([facility] [address] 10/13/2020    Depression     Elevated glucose     HTN (hypertension)     Hyperlipidemia     Osteopenia     Hips    Pulmonary hypertension ([facility] [address] 10/13/2020    Rheumatic heart disease     as a child    Rib fractures     Secondary to AA       Past Surgical History:   Procedure Laterality Date    APPENDECTOMY       SECTION      COLONOSCOPY      Internal hemorrhoids    COLONOSCOPY      normal    CT NEEDLE BIOPSY LUNG PERCUTANEOUS  2020    CT NEEDLE BIOPSY LUNG PERCUTANEOUS 2020 [facility] CT SCAN    EYE SURGERY      HYSTERECTOMY (CERVIX STATUS UNKNOWN)         Social History     Socioeconomic History    Marital status:      Spouse name: None    Number of children: None    Years of education: None    Highest education level: None   Tobacco Use    Smoking status: Never    Smokeless tobacco: Never   Vaping Use    Vaping Use: Never used   Substance and Sexual Activity    Alcohol use: No    Drug use: Never    Sexual activity: Not Currently     Partners: Male       Family History   Problem Relation Age of Onset    Heart Disease Mother         Valvular heart disease from childhood rheumatic fever    Diabetes Maternal Aunt          Review of Systems:   Review of Systems   Constitutional:  Positive for activity change (sedentary. sits in chair most of day) and fatigue. Negative for chills and fever. HENT:  Negative for congestion. Respiratory:  Negative for cough, chest tightness and shortness of breath. Cardiovascular:  Negative for chest pain, palpitations and leg swelling. No orthopnea or PND   Gastrointestinal:  Negative for abdominal pain, blood in stool, nausea and vomiting. Genitourinary:  Negative for difficulty urinating, dysuria and hematuria. Musculoskeletal:  Negative for arthralgias and myalgias. Skin:  Negative for color change, pallor and rash. Neurological:  Negative for dizziness, syncope and light-headedness. Psychiatric/Behavioral:  Negative for agitation and behavioral problems. Physical Examination:    /62 (Site: Right Lower Arm, Position: Sitting, Cuff Size: Medium Adult)   Pulse 57   Resp 14   Wt 164 lb 6.4 oz (74.6 kg)   SpO2 99%   BMI 31.06 kg/m²    Physical Exam  Constitutional:       General: She is not in acute distress. Appearance: Normal appearance. HENT:      Head: Normocephalic and atraumatic. Neck:      Vascular: No carotid bruit. Cardiovascular:      Rate and Rhythm: Normal rate. Rhythm irregular. Heart sounds: No murmur heard. Pulmonary:      Effort: Pulmonary effort is normal. No respiratory distress. Breath sounds: Normal breath sounds. No wheezing, rhonchi or rales. Abdominal:      Palpations: Abdomen is soft. Tenderness: There is no abdominal tenderness. Musculoskeletal:         General: Normal range of motion. Cervical back: Normal range of motion and neck supple. Right lower leg: No edema. Left lower leg: No edema. Skin:     General: Skin is warm and dry. Neurological:      General: No focal deficit present. Mental Status: She is alert and oriented to person, place, and time. Cranial Nerves: No cranial nerve deficit.    Psychiatric:         Mood and Affect: Mood normal.         Behavior: Behavior normal.       LABS:  CBC:   Lab Results   Component Value Date/Time    WBC 10.3 04/18/2022 09:45 AM    RBC 4.27 04/18/2022 09:45 AM    HGB 13.6 04/18/2022 09:45 AM    HCT 41.9 04/18/2022 09:45 AM    MCV 98.1 04/18/2022 09:45 AM    MCH 31.8 04/18/2022 09:45 AM    MCHC 32.4 04/18/2022 09:45 AM    RDW 13.7 04/18/2022 09:45 AM     04/18/2022 09:45 AM    MPV 9.7 07/09/2014 10:25 AM     Lipids:  Lab Results   Component Value Date    CHOL 120 05/19/2021    CHOL 207 (H) 07/09/2014    CHOL 192 05/25/2012     Lab Results   Component Value Date    TRIG 97 05/19/2021    TRIG 101 07/09/2014    TRIG 117 05/25/2012     Lab Results Component Value Date    HDL 58 05/19/2021    HDL 69 (H) 07/09/2014    HDL 51 05/25/2012     Lab Results   Component Value Date    LDLCALC 43 05/19/2021    LDLCALC 118 07/09/2014    LDLCALC 122 05/25/2012     No results found for: LABVLDL, VLDL  Lab Results   Component Value Date    CHOLHDLRATIO 3.8 05/25/2012     CMP:    Lab Results   Component Value Date/Time     04/18/2022 09:45 AM    K 5.1 04/18/2022 09:45 AM     04/18/2022 09:45 AM    CO2 19 04/18/2022 09:45 AM    BUN 17 04/18/2022 09:45 AM    CREATININE 0.97 04/18/2022 09:45 AM    GFRAA >60.0 04/18/2022 09:45 AM    LABGLOM 54.4 04/18/2022 09:45 AM    GLUCOSE 234 04/18/2022 09:45 AM    GLUCOSE 123 05/25/2012 08:28 AM    PROT 7.3 04/18/2022 09:45 AM    LABALBU 3.5 04/18/2022 09:45 AM    LABALBU 4.2 05/25/2012 08:28 AM    CALCIUM 9.1 04/18/2022 09:45 AM    BILITOT 0.5 04/18/2022 09:45 AM    ALKPHOS 48 04/18/2022 09:45 AM    AST 33 04/18/2022 09:45 AM    ALT 13 04/18/2022 09:45 AM     BMP:    Lab Results   Component Value Date/Time     04/18/2022 09:45 AM    K 5.1 04/18/2022 09:45 AM     04/18/2022 09:45 AM    CO2 19 04/18/2022 09:45 AM    BUN 17 04/18/2022 09:45 AM    LABALBU 3.5 04/18/2022 09:45 AM    LABALBU 4.2 05/25/2012 08:28 AM    CREATININE 0.97 04/18/2022 09:45 AM    CALCIUM 9.1 04/18/2022 09:45 AM    GFRAA >60.0 04/18/2022 09:45 AM    LABGLOM 54.4 04/18/2022 09:45 AM    GLUCOSE 234 04/18/2022 09:45 AM    GLUCOSE 123 05/25/2012 08:28 AM     Magnesium:    Lab Results   Component Value Date/Time    MG 2.1 06/24/2020 10:00 AM     TSH:  Lab Results   Component Value Date    TSH 2.490 05/19/2021     . result  No results for input(s): PROBNP in the last 72 hours. No results for input(s): INR in the last 72 hours.             Patient Active Problem List   Diagnosis    HTN (hypertension)    Elevated glucose    Hyperlipidemia    Osteopenia    A-fib (HCC)    Lung nodule    Cardiomyopathy, nonischemic ([address]    Pulmonary hypertension ([address] Assessment/Plan:     Diagnosis Orders   1. Chronic diastolic CHF (congestive heart failure), NYHA class 1 (HCC)  Basic Metabolic Panel    Brain Natriuretic Peptide    Hx recovered CMP. EF 50% per echo 10/2021. Compensated. Continue current meds      2. Cardiomyopathy, nonischemic (HCC)      Hx of NICMP. Recovered LVF EF 50% per most recent echo 10/21/21      3. Paroxysmal atrial fibrillation (HCC)  LONGTERM CONTINUOUS CARDIAC EVENT MONITOR (ZIO)    On Cardizem 120mg PO daily, Lopressor 50mg PO BID, and OAC with xarelto 20mg PO daily. 4. Pulmonary hypertension (HCC)      Mild with RVSP 40mmHg per echo 10/21/21.      5. Valvular heart disease      1-2+ MR and Mild TR per echo 10/21/21      6. Bradycardia  LONGTERM CONTINUOUS CARDIAC EVENT MONITOR (ZIO)    HR in 40s per EKG 5/13/22 and HR 57bpm in office today. Cardiac monitor (zio patch) ordered to further evaluate for possible SSS      7. Fatigue, unspecified type  LONGTERM CONTINUOUS CARDIAC EVENT MONITOR (ZIO)    ? etiology. May be related to Hx depression +/- bradyarrhymias. Zio patch ordered to further evaluate bradyarrhythmias      8. Renal insufficiency  Basic Metabolic Panel    Likely CKD stage III. Consider repeat BMP in future to re-evaluate. If CrCl below 51, will need to reduce xarelto to 15mg PO daily          -Maximize medical therapy--aspirin 81 mg p.o. daily, Lopressor 50 mg p.o. twice daily, Cardizem  mg p.o. daily, Entresto 24/26 mg p.o. twice daily, Lasix 20 mg p.o. daily, Lipitor 20 mg tablet p.o. daily at bedtime, OAC with Xarelto 15 mg p.o. daily (dose was reduced back in 5/2021 as CrCl<51)  -Consider discontinuing lasix in future if renal dysfunction persists/worsens as patient appears euvolemic  -Heart Failure appears compensated at this time.   No need for further medication adjustments  -Check BMP today to re-evaluate renal function  -Check pro-BNP to re-evaluate fluid status  -Cardiac/<2 gram sodium diet recommended  -Recommend 2000mL daily fluid restriction   -Instructed patient to weigh self daily every morning upon waking and keep log book of daily weights. Notify office if gaining more than 3 pounds in 48 hours. -Advised patient to notify office immediately if experiencing any progressive SOB, orthopnea, PND, LE edema or weight gain  -Educated patient on importance of fluid and salt restriction as well as lifestyle modification  -Maintain outpatient follow-up with pulmonology, Dr. Nneka Mac routine outpatient follow-up with general cardiologist, Dr. Marquis Stager appt 5/19/23  **Will order 2-week cardiac monitor (Zio patch) to further evaluate underlying bradycardia arrhythmias/possible sick sinus syndrome. Patient does complain of fatigue and low energy stating that she is predominantly sedentary now when she was previously a pretty active lady. Uncertain if underlying bradycardia arrhythmias are contributing to patient's fatigue complaints. Echocardiogram 10/21/2021: normal LV systolic function with EF of 50%, 1-2+ MR, mild TR, RVSP 40   mmHg, moderately dilated left atrium  [facility] 6/26/20: Normal coronaries  -Maintain routine outpatient follow-up with PCP  -F/U with CHF clinic in 1 year or sooner if needed        Counseling: The importance of daily weights, dietary sodium restriction, and contact with cardiology if weight is increased more than 3 lbs in any 48 hour period was stressed. The patient has been advised to contact us if theyexperience progressive SOB, orthopnea, PND or progressive edema.

## 2022-11-17 ENCOUNTER — TELEPHONE (OUTPATIENT)
Dept: CARDIOLOGY CLINIC | Age: 87
End: 2022-11-17

## 2022-11-17 NOTE — TELEPHONE ENCOUNTER
----- Message from [place], [place] sent at 11/17/2022  8:17 AM EST -----  Also, have patient change lasix to 20mg daily as needed for weight gain of 3 pounds or more in 2 days.     Thanks

## 2022-11-25 RX ORDER — FUROSEMIDE 20 MG/1
20 TABLET ORAL DAILY
Qty: 180 TABLET | Refills: 3 | Status: CANCELLED | OUTPATIENT
Start: 2022-11-25

## 2022-12-15 ENCOUNTER — TELEPHONE (OUTPATIENT)
Dept: CARDIOLOGY CLINIC | Age: 87
End: 2022-12-15

## 2022-12-15 DIAGNOSIS — I48.0 PAROXYSMAL ATRIAL FIBRILLATION (HCC): ICD-10-CM

## 2022-12-15 DIAGNOSIS — R53.83 FATIGUE, UNSPECIFIED TYPE: ICD-10-CM

## 2022-12-15 DIAGNOSIS — R00.1 BRADYCARDIA: ICD-10-CM

## 2022-12-15 NOTE — TELEPHONE ENCOUNTER
Per Dr Hooker Sessions patient needs follow up with Ismael urrutia soon to discuss zio patch findings.  Left message on machine for pt to call back to schedule

## 2022-12-20 RX ORDER — FUROSEMIDE 20 MG/1
20 TABLET ORAL DAILY
Qty: 180 TABLET | Refills: 3 | Status: SHIPPED | OUTPATIENT
Start: 2022-12-20

## 2022-12-20 NOTE — TELEPHONE ENCOUNTER
Requesting medication refill.  Please approve or deny this request.    Rx requested:  Requested Prescriptions     Pending Prescriptions Disp Refills    furosemide (LASIX) 20 MG tablet 180 tablet 3     Sig: Take 1 tablet by mouth daily         Last Office Visit:   5/13/2022      Next Visit Date:  Future Appointments   Date Time Provider Yuridia López   1/9/2023 11:45 AM ART [facility] - [address]   5/19/2023 12:30 PM DO Rajendra Negron [facility] Rajendra   11/16/2023 11:00 AM NANY [facility] EMERGENCY MEDICAL [place] AT [place]

## 2022-12-31 DIAGNOSIS — I50.22 CHRONIC SYSTOLIC CHF (CONGESTIVE HEART FAILURE), NYHA CLASS 1 (HCC): ICD-10-CM

## 2022-12-31 DIAGNOSIS — I42.8 CARDIOMYOPATHY, NONISCHEMIC (HCC): ICD-10-CM

## 2023-01-03 RX ORDER — SACUBITRIL AND VALSARTAN 24; 26 MG/1; MG/1
TABLET, FILM COATED ORAL
Qty: 180 TABLET | Refills: 3 | Status: SHIPPED | OUTPATIENT
Start: 2023-01-03

## 2023-01-03 NOTE — TELEPHONE ENCOUNTER
Please approve or deny this refill request. The order is pended. Thank you.     LOV 05/13/2022 has an OV on 01/09/2023    Next Visit Date:  Future Appointments   Date Time Provider Yuridia López   1/9/2023 11:45 AM ART Rodriguez - CNP [facility] KAREN [place]   5/19/2023 12:30 PM DO Rajendra Caballero [facility] Rajendra   11/16/2023 11:00 AM [address] [facility] PA APURVA [facility] EMERGENCY MEDICAL [place] AT [place]

## 2023-02-06 ENCOUNTER — OFFICE VISIT (OUTPATIENT)
Dept: CARDIOLOGY CLINIC | Age: 88
End: 2023-02-06
Payer: MEDICARE

## 2023-02-06 VITALS
HEART RATE: 54 BPM | SYSTOLIC BLOOD PRESSURE: 139 MMHG | DIASTOLIC BLOOD PRESSURE: 67 MMHG | OXYGEN SATURATION: 95 % | WEIGHT: 165.6 LBS | BODY MASS INDEX: 31.29 KG/M2

## 2023-02-06 DIAGNOSIS — I10 PRIMARY HYPERTENSION: ICD-10-CM

## 2023-02-06 DIAGNOSIS — E78.2 MIXED HYPERLIPIDEMIA: ICD-10-CM

## 2023-02-06 DIAGNOSIS — I48.0 PAROXYSMAL ATRIAL FIBRILLATION (HCC): ICD-10-CM

## 2023-02-06 DIAGNOSIS — R00.1 BRADYCARDIA: Primary | ICD-10-CM

## 2023-02-06 DIAGNOSIS — I42.8 CARDIOMYOPATHY, NONISCHEMIC (HCC): ICD-10-CM

## 2023-02-06 PROCEDURE — 99213 OFFICE O/P EST LOW 20 MIN: CPT | Performed by: NURSE PRACTITIONER

## 2023-02-06 PROCEDURE — G8484 FLU IMMUNIZE NO ADMIN: HCPCS | Performed by: NURSE PRACTITIONER

## 2023-02-06 PROCEDURE — 1123F ACP DISCUSS/DSCN MKR DOCD: CPT | Performed by: NURSE PRACTITIONER

## 2023-02-06 PROCEDURE — G8427 DOCREV CUR MEDS BY ELIG CLIN: HCPCS | Performed by: NURSE PRACTITIONER

## 2023-02-06 PROCEDURE — 1036F TOBACCO NON-USER: CPT | Performed by: NURSE PRACTITIONER

## 2023-02-06 PROCEDURE — G8417 CALC BMI ABV UP PARAM F/U: HCPCS | Performed by: NURSE PRACTITIONER

## 2023-02-06 PROCEDURE — 1090F PRES/ABSN URINE INCON ASSESS: CPT | Performed by: NURSE PRACTITIONER

## 2023-02-06 RX ORDER — METOPROLOL TARTRATE 50 MG/1
25 TABLET, FILM COATED ORAL 2 TIMES DAILY
Qty: 60 TABLET | Refills: 0 | Status: SHIPPED | OUTPATIENT
Start: 2023-02-06

## 2023-02-06 RX ORDER — KETOCONAZOLE 20 MG/ML
SHAMPOO TOPICAL
COMMUNITY
Start: 2022-11-18

## 2023-02-06 ASSESSMENT — ENCOUNTER SYMPTOMS
COUGH: 0
ABDOMINAL PAIN: 0
CHEST TIGHTNESS: 0
COLOR CHANGE: 0
VOMITING: 0
BLOOD IN STOOL: 0
SHORTNESS OF BREATH: 0
NAUSEA: 0

## 2023-02-06 NOTE — PROGRESS NOTES
Patient: Sacha Rai  YOB: 1935  MRN: [id number]    Chief Complaint:  Chief Complaint   Patient presents with    Results     Zio          Subjective/HPI     2/6/2023: Patient seen in office today for follow-up visit to discuss recent test results. Patient wore 2-week Zio patch due to complaints of fatigue and bradycardia noted on EKGs. Zio showed predominant underlying rhythm was sinus rhythm. A-fib burden of 1%. Patient noted to have 1 cardiac pause lasting 3.2 seconds. Patient reports she feels like she has had more energy recently. States she has been feeling okay overall. Denies any episodes of chest pain, shortness of breath, dizziness, lightheadedness, syncope. We discussed cardiac pauses and indications for permanent pacemaker. At this time patient reports given her age she is not interested in having a pacemaker. She is agreeable to adjusting medications and discussing this with Dr. Vivian Bhat in a few months. She is also requesting samples of Xarelto and Entresto which were provided to her. 11/16/22: Here for follow-up of chronic congestive heart failure and recovered nonischemic cardiomyopathy. Patient was last seen in CHF clinic on 5/19/2021. She is accompanied by her  in office today. She reports doing well overall since her last office visit. She does complain of low energy and fatigue and tiredness reporting that she used to be very active but within the past couple years is mostly sedentary. She denies any shortness of breath or dyspnea exertion, chest pain, palpitations, diaphoresis, paroxysmal nocturnal dyspnea, orthopnea, lower extremity edema, dizziness, lightheadedness, syncope, fever or chills. She is compliant with low-sodium diet and fluid restriction. She is compliant with all of her medications. She is on oral anticoagulation with Xarelto and denies any bleeding issues.     Given patient's complaints of fatigue, tiredness and low energy as well as bradycardia noted on EKG from May 2022 and bradycardia on vitals today in office, I discussed with patient and  regarding cardiac monitor to further evaluate for underlying bradycardia arrhythmias/sick sinus syndrome. She does have a history of paroxysmal atrial fibrillation as well. Bradycardia could be contributing to patient's fatigue and tiredness although history of depression also likely plays a part. Patient and  agreeable to cardiac monitor so Zio patch will be ordered. Most recent diagnostic testing reviewed and documented below. EKG 5/13/22: SB 46, frequent PACs, QTc 454ms    Echocardiogram 10/21/21:  Conclusions   Summary   MV Leaflet thick   1-2+ MR   Normal tricuspid valve structure and function. Mild TR   RVSP 40 mmHg   Normal left ventricle structure and function. Left ventricular ejection fraction is visually estimated at 50%. Pseudonormal filling pattern noted. Moderately dilated left atrium. Signature   ----------------------------------------------------------------   Electronically signed by Kay Mojica MD(Interpreting   physician) on 10/21/2021 11:07 AM   ----------------------------------------------------------------    Most recent labs reviewed and documented below. CMP on 4/18/2022: Sodium 144, potassium elevated 5.1 but hemolysis is exceeded interference, chloride 110, total CO2 of 19, BUN 17, creatinine elevated 0.97, GFR low at 54.4, glucose elevated 234. ALT 13, AST 33  CBC 4/18/2022: WBC 10.3, hemoglobin 13.6, hematocrit 41.9, platelets 605  TSH on 5/19/2021: 2.490  Lipid panel 5/19/2021: Total cholesterol 120, triglycerides 97, HDL 58, LDL 43    Vital signs stable in office today. Blood pressure 112/62, heart rate 57, SPO2 99% on room air. Weight of 164.4 pounds compared to 174 pounds at last visit on 5/19/2022.       5/19/21: Patient here for follow-up of chronic congestive heart failure and history of nonischemic cardiomyopathy with recovered LV function per echo 10/20/2020. Patient was last seen in the office on 11/16/2020 and since that time has been doing well with no new or worsening heart failure symptoms. She is accompanied by her daughter today in office. She is compliant with all of her medications and with low-sodium diet and fluid restriction. She does not check her weight daily but when she does she has not noticed any significant weight gain. She is actually lost 4 pounds since her last office visit with me in November. She denies shortness of breath or dyspnea on exertion, chest pain, palpitations, diaphoresis, paroxysmal nocturnal dyspnea, orthopnea, lower extremity edema, syncope, fever or chills. She is on oral anticoagulation with Xarelto and denies any bleeding issues. Last blood work on record was completed in November 2020. Per daughter, patient had blood work completed today as ordered by her PCP Dr. Tamiko Arthur however this has not yet been resulted. Vital signs stable in office today. Blood pressure 142/72, heart rate 69, pulse ox 97% on room air. Weight is 174 pounds compared to 178 pounds at last office visit on 11/16/2020.      11/16/20: Here for follow-up of systolic congestive heart failure. Was last seen in the office in August 2020. Since that time, she states she has been doing very well overall. Her daughter is with her during office visit today. She has no new or worsening heart failure symptoms. Patient was reportedly out of her medications for about a week around a week ago and was not feeling that well but since resuming all of her meds she is back to her baseline. She has a history of a nonischemic cardiomyopathy with EF of 35 to 40% per echo in June 2020. She recently had repeat echocardiogram completed on 10/2/2020 which revealed improvement in LV systolic function with EF now 50%, mild 1+ mitral valve regurgitation, moderately elevated RVSP at 59 mmHg.   She is compliant with low-sodium diet and fluid restriction. Per patient's daughter, patient does not weigh herself every morning but no weight gain reported. She is on oral anticoagulation with Xarelto for A. fib and denies any bleeding issues. She denies chest pain, palpitations, diaphoresis, shortness of breath or dyspnea on exertion, nausea, vomiting, dizziness, lightheadedness, paroxysmal nocturnal dyspnea, orthopnea, lower extremity edema, syncope, fever or chills. Patient follows with pulmonology and completed repeat CT of the chest on 8/3/2020 for evaluation of left upper lobe lung nodules. This CT revealed persistent cluster of noncalcified nodules within the anterior superior aspect of the left upper lobe. She had follow-up office visit with pulmonology on 9/15/2020 and was referred to interventional radiology for CT-guided lung biopsy. She underwent CT-guided lung biopsy on 11/2/2020 and has follow-up appointment tomorrow 11/17/2020 with Dr. Aaron Duarte to discuss pathology results. EKG 10/13/20:  SR 62, PACs, ST depression leads I, aVL, QTc 464ms    Echocardiogram 10/2/20:   Conclusions   Summary   Normal left ventricular systolic function, no regional wall motion   abnormalities, estimated ejection fraction of 50%. improved as compared to   previous echo on 6/2020   Normal left ventricular size and function. Normal left ventricular wall thickness. Mild (1+) mitral regurgitation is present. No evidence of mitral valve stenosis. No evidence of aortic valve regurgitation . No evidence of aortic valve stenosis. The left atrium is Mild to moderate dilated. There is mild tricuspid regurgitation with estimated RVSP of 59 mm Hg. Right ventricular systolic pressure of 59 mm Hg consistent with moderate   pulmonary hypertension.    Signature   ----------------------------------------------------------------   Electronically signed by Vickie Mace DO(Interpreting   physician) on 10/02/2020 05:39 PM    Most recent labs reviewed and documented below. [facility] 8/17/2020: Sodium 141, potassium 4.1, chloride 107, total CO2 23, BUN elevated 26, creatinine elevated 0.98, GFR low at 53.9, glucose 145  proBNP 8/17/2020: 902 compared to 2004 on 7/9/2020    Vital signs stable in office today. Blood pressure 112/76, heart rate 82, pulse ox 97% on room air. Weight is 178 pounds which is the exact same weight she was on 8/17/2020.        8/17/20: Here for follow-up of systolic congestive heart failure and nonischemic cardiomyopathy. Was last seen in office on 7/9/2020 and since that time is been doing very well overall. She denies any new or worsening CHF symptoms. She is compliant with all of her medications. She does report taking Lopressor only 50 mg once a day instead of twice daily and has been advised to go home and clarify exact medication in frequency is listed on medication bottle and call us back. Daughter accompanies her in office today. She denies any complaint of chest pain, shortness of breath or significant dyspnea on exertion, nausea, vomiting, dizziness, lightheadedness, diaphoresis, palpitations, paroxysmal nocturnal dyspnea, orthopnea, worsening lower extremity edema, syncope, fever or chills. She is on oral anticoagulation with Xarelto and denies any bleeding issues. She is adherent to low-sodium diet and fluid restriction but states that she does eat a significant amount of cheese which she has been cautioned to limit. Most recent labs reviewed and documented below. [facility] 7/9/2020: Sodium 143, potassium 4.1, chloride 103, total CO2 30, BUN elevated 24, creatinine elevated 1.16, GFR low at 44.4, glucose 91  proBNP 7/9/2020: 2004 compared to as high as 4654 on 6/24/2020    Vital signs stable in office today. Blood pressure 149/77, heart rate 74, pulse ox 98% on room air. Weight is 178 pounds compared to 175 pounds at last visit on 7/9/2020.     Past with patient and daughter regarding repeat echocardiogram to reevaluate LV function and mitral regurgitation. This will be scheduled no sooner than September 26, 2020 which is approx 3 months from her prior echo. 7/9/20 CHF clinic visit #1: This is a very pleasant 35-year-old  female who presents for initial CHF clinic evaluation following recent hospital admission for new onset A. fib RVR and new onset congestive heart failure. Echocardiogram completed during hospital admission revealed reduced LV systolic function with ejection fraction of 35 to 40% and 3-4+ MR with RVSP of 60 mmHg. She was initiated on IV diuresis with Lasix as well anticoagulation with full dose Lovenox and optimized on rate control and CHF medications. Given new onset congestive heart failure, mild troponin elevation, new onset A. fib and cardiomyopathy patient underwent cardiac catheterization on 6/26/2020 to rule out underlying cardiac ischemia. Cardiac catheterization revealed normal coronary arteries with EF of 45%. Also, patient underwent BEBE on 6/26/2020 which revealed 2+ MR, RVSP 48 mmHg and EF of 40%. Patient had initially converted back to sinus rhythm after initiation of IV Cardizem in the emergency room however she converted back to A. fib RVR during admission. Rate control medications were titrated and on 6/27/2020 she had converted back to sinus rhythm. Asymptomatically improved during the course of her admission. She was transitioned to oral anticoagulation with Xarelto at discharge. She was discharged home in stable condition on 6/27/2020. Since discharge, patient states that she has been doing very well overall and has had continued improvement in her shortness of breath. She will still experience shortness of breath with moderate exertion such as when walking up a flight of steps but otherwise is able to carry out her normal activities without any trouble breathing.   She admits to being sedentary recently not feeling like doing many of the normal activities that she used to.  Denies chest pain, palpitations, diaphoresis, paroxysmal nocturnal dyspnea, orthopnea, lower extremity edema, syncope, cough, fever or chills. Denies any difficulty urinating or any painful urination. She denies any bleeding issues on Xarelto including hematochezia or melena. He is adherent to a low-sodium diet as well as fluid restriction. She has been weighing herself on a daily basis and weight at home this morning was 175 pounds compared to 182 pounds on day of discharge from the hospital.    EKG in office today shows sinus rhythm with ventricular rate of 63 bpm, ST depression in leads I and aVL and poor R wave progression in V2 through V6,  ms. Recent labs reviewed and documented below. BMP 6/27/2020: Sodium 142, potassium 3.8, chloride 106, total CO2 24, BUN 12, creatinine 1.11, GFR 46.7, glucose 120  CBC 6/26/2020: WBC 10.3, hemoglobin 14.9, hematocrit 44.8, platelets 878  proBNP 6/24/2020: 4654  TSH 6/24/2020: 1.40      Vital signs stable in office today with blood pressure 118/82, heart rate 64, pulse ox 98% on room air. Weight is 175 pounds      Echocardiogram 6/24/20:  Conclusions   Summary   Left ventricular ejection fraction is visually estimated at 35-40%. global   hypokinesis. difficult to estimate due to afib. Left ventricular size is normal .   Pseudonormal filling pattern noted. Moderately severe (3-4+) mitral regurgitation is present. No evidence of mitral valve stenosis. No evidence of aortic valve regurgitation . No evidence of aortic valve stenosis. There is severe tricuspid regurgitation with estimated RVSP of 60 mm Hg. Right ventricular systolic pressure of 60 mm Hg consistent with moderate   pulmonary hypertension. The left atrium is Moderately dilated.    Signature   ----------------------------------------------------------------   Electronically signed by Hans Babcock DO(Interpreting   physician) on 06/25/2020 05:56 PM    [facility] 20:  Conclusions  Procedure Summary  normal coronaries  normal LVF  PCWP 20mmHg  mod PHTN  normal C. I and C.O  no shunts. Recommendations  Aggressive risk factor management. Maximize medical therapy. Angiographic Findings   Cardiac Arteries and Lesion Findings  LMCA: Normal.  LAD: small caliber, tapers distally. LCx: Normal.  RCA: Normal.  Dominance: Right  LV function assessed as:Abnormal.  Ejection Fraction    - Method: LV gram. EF%: 45. BEBE 20:  Conclusions   Summary   Left ventricular ejection fraction is visually estimated at 40%. Left ventricular size is mildly increased . Moderate (2+) mitral regurgitation is present. No evidence of aortic valve regurgitation . There is mild tricuspid regurgitation with estimated RVSP of 48 mm Hg. No evidence of left to right shunt with color flow doppler or right to   left shunt with agitated saline contrast study. Moderately dilated LA. Recommendation   [facility] to follow.    Signature   ----------------------------------------------------------------   Electronically signed by Rani Frank DO(Interpreting   physician) on 2020 09:26 AM      PMHx:  Newly diagnosed A-fib  Normal coronaries per [facility] 20  Moderate 2+ MR  Systolic CHF     PSHx:   x 3  Tonsillectomy  Hysterectomy    Allergies:  None    Social Hx:  No smoking  Social drinker       Allergies   Allergen Reactions    Remeron [Mirtazapine] Hallucinations       Current Outpatient Medications   Medication Sig Dispense Refill    ketoconazole (NIZORAL) 2 % shampoo APPLY TOPICALLY 3 TIMES A WEEK AS DIRECTED      metoprolol tartrate (LOPRESSOR) 50 MG tablet Take 0.5 tablets by mouth 2 times daily 60 tablet 0    ENTRESTO 24-26 MG per tablet TAKE 1 TABLET BY MOUTH  TWICE DAILY 180 tablet 3    furosemide (LASIX) 20 MG tablet Take 1 tablet by mouth daily 180 tablet 3    donepezil (ARICEPT) 10 MG tablet 10 mg nightly      sertraline (ZOLOFT) 25 MG tablet Take 25 mg by mouth daily Cholecalciferol (VITAMIN D3) 50 MCG ( UT) CAPS Take by mouth      atorvastatin (LIPITOR) 20 MG tablet TAKE 1 TABLET BY MOUTH AT  NIGHT 90 tablet 3    dilTIAZem (CARDIZEM CD) 120 MG extended release capsule TAKE 1 CAPSULE BY MOUTH  DAILY 90 capsule 3    rivaroxaban (XARELTO) 15 MG TABS tablet Take 1 tablet by mouth daily (with breakfast) 90 tablet 2    acetaminophen (TYLENOL) 325 MG tablet Take 650 mg by mouth every 6 hours as needed. oxybutynin (DITROPAN) 5 MG tablet Take 1 tablet by mouth 3 times daily. 270 tablet 1    aspirin 81 MG EC tablet Take 81 mg by mouth daily. multivitamin (ANTIOXIDANT;PROSIGHT) TABS per tablet Take 1 tablet by mouth daily. diphenhydrAMINE (BENADRYL) 25 MG tablet Take 25 mg by mouth nightly as needed For sleep (Patient not taking: Reported on 2023)       No current facility-administered medications for this visit.        Past Medical History:   Diagnosis Date    Atrial fibrillation ([facility] [address]     Cardiomyopathy, nonischemic ([facility] [address] 10/13/2020    Depression     Elevated glucose     HTN (hypertension)     Hyperlipidemia     Osteopenia     Hips    Pulmonary hypertension ([facility] [address] 10/13/2020    Rheumatic heart disease     as a child    Rib fractures     Secondary to AA       Past Surgical History:   Procedure Laterality Date    APPENDECTOMY       SECTION      COLONOSCOPY      Internal hemorrhoids    COLONOSCOPY      normal    CT NEEDLE BIOPSY LUNG PERCUTANEOUS  2020    CT NEEDLE BIOPSY LUNG PERCUTANEOUS 2020 [facility] CT SCAN    EYE SURGERY      HYSTERECTOMY (CERVIX STATUS UNKNOWN)         Social History     Socioeconomic History    Marital status:    Tobacco Use    Smoking status: Never    Smokeless tobacco: Never   Vaping Use    Vaping Use: Never used   Substance and Sexual Activity    Alcohol use: No    Drug use: Never    Sexual activity: Not Currently     Partners: Male       Family History   Problem Relation Age of Onset    Heart Disease Mother         Valvular heart disease from childhood rheumatic fever    Diabetes Maternal Aunt          Review of Systems:   Review of Systems   Constitutional:  Positive for fatigue. Negative for chills and fever. Activity change: sedentary. sits in chair most of day. HENT:  Negative for congestion. Respiratory:  Negative for cough, chest tightness and shortness of breath. Cardiovascular:  Negative for chest pain, palpitations and leg swelling. No orthopnea or PND   Gastrointestinal:  Negative for abdominal pain, blood in stool, nausea and vomiting. Genitourinary:  Negative for difficulty urinating, dysuria and hematuria. Musculoskeletal:  Negative for arthralgias and myalgias. Skin:  Negative for color change, pallor and rash. Neurological:  Negative for dizziness, syncope and light-headedness. Psychiatric/Behavioral:  Negative for agitation and behavioral problems. Physical Examination:    /67 (Site: Right Lower Arm, Position: Sitting, Cuff Size: Medium Adult)   Pulse 54   Wt 165 lb 9.6 oz (75.1 kg)   SpO2 95%   BMI 31.29 kg/m²    Physical Exam  Constitutional:       General: She is not in acute distress. Appearance: Normal appearance. HENT:      Head: Normocephalic and atraumatic. Neck:      Vascular: No carotid bruit. Cardiovascular:      Rate and Rhythm: Normal rate. Rhythm irregular. Heart sounds: No murmur heard. Pulmonary:      Effort: Pulmonary effort is normal. No respiratory distress. Breath sounds: Normal breath sounds. No wheezing, rhonchi or rales. Abdominal:      Palpations: Abdomen is soft. Tenderness: There is no abdominal tenderness. Musculoskeletal:         General: Normal range of motion. Cervical back: Normal range of motion and neck supple. Right lower leg: No edema. Left lower leg: No edema. Skin:     General: Skin is warm and dry. Neurological:      General: No focal deficit present.       Mental Status: She is alert and oriented to person, place, and time. Cranial Nerves: No cranial nerve deficit.    Psychiatric:         Mood and Affect: Mood normal.         Behavior: Behavior normal.       LABS:  CBC:   Lab Results   Component Value Date/Time    WBC 10.3 04/18/2022 09:45 AM    RBC 4.27 04/18/2022 09:45 AM    HGB 13.6 04/18/2022 09:45 AM    HCT 41.9 04/18/2022 09:45 AM    MCV 98.1 04/18/2022 09:45 AM    MCH 31.8 04/18/2022 09:45 AM    MCHC 32.4 04/18/2022 09:45 AM    RDW 13.7 04/18/2022 09:45 AM     04/18/2022 09:45 AM    MPV 9.7 07/09/2014 10:25 AM     Lipids:  Lab Results   Component Value Date    CHOL 120 05/19/2021    CHOL 207 (H) 07/09/2014    CHOL 192 05/25/2012     Lab Results   Component Value Date    TRIG 97 05/19/2021    TRIG 101 07/09/2014    TRIG 117 05/25/2012     Lab Results   Component Value Date    HDL 58 05/19/2021    HDL 69 (H) 07/09/2014    HDL 51 05/25/2012     Lab Results   Component Value Date    LDLCALC 43 05/19/2021    LDLCALC 118 07/09/2014    LDLCALC 122 05/25/2012     No results found for: LABVLDL, VLDL  Lab Results   Component Value Date    CHOLHDLRATIO 3.8 05/25/2012     CMP:    Lab Results   Component Value Date/Time     11/16/2022 02:14 PM    K 4.7 11/16/2022 02:14 PM     11/16/2022 02:14 PM    CO2 22 11/16/2022 02:14 PM    BUN 26 11/16/2022 02:14 PM    CREATININE 1.22 11/16/2022 02:14 PM    GFRAA >60.0 04/18/2022 09:45 AM    LABGLOM 42.9 11/16/2022 02:14 PM    GLUCOSE 105 11/16/2022 02:14 PM    GLUCOSE 123 05/25/2012 08:28 AM    PROT 7.3 04/18/2022 09:45 AM    LABALBU 3.5 04/18/2022 09:45 AM    LABALBU 4.2 05/25/2012 08:28 AM    CALCIUM 9.4 11/16/2022 02:14 PM    BILITOT 0.5 04/18/2022 09:45 AM    ALKPHOS 48 04/18/2022 09:45 AM    AST 33 04/18/2022 09:45 AM    ALT 13 04/18/2022 09:45 AM     BMP:    Lab Results   Component Value Date/Time     11/16/2022 02:14 PM    K 4.7 11/16/2022 02:14 PM     11/16/2022 02:14 PM    CO2 22 11/16/2022 02:14 PM    BUN 26 11/16/2022 02:14 PM    LABALBU 3.5 04/18/2022 09:45 AM    LABALBU 4.2 05/25/2012 08:28 AM    CREATININE 1.22 11/16/2022 02:14 PM    CALCIUM 9.4 11/16/2022 02:14 PM    GFRAA >60.0 04/18/2022 09:45 AM    LABGLOM 42.9 11/16/2022 02:14 PM    GLUCOSE 105 11/16/2022 02:14 PM    GLUCOSE 123 05/25/2012 08:28 AM     Magnesium:    Lab Results   Component Value Date/Time    MG 2.1 06/24/2020 10:00 AM     TSH:  Lab Results   Component Value Date    TSH 2.490 05/19/2021     . result  No results for input(s): PROBNP in the last 72 hours. No results for input(s): INR in the last 72 hours. Patient Active Problem List   Diagnosis    HTN (hypertension)    Elevated glucose    Hyperlipidemia    Osteopenia    A-fib (HCC)    Lung nodule    Cardiomyopathy, nonischemic (HCC)    Pulmonary hypertension (HCC)       Assessment/Plan:     Diagnosis Orders   1. Bradycardia        2. Paroxysmal atrial fibrillation (HCC)  metoprolol tartrate (LOPRESSOR) 50 MG tablet      3. Cardiomyopathy, nonischemic ([address]        4. Primary hypertension        5. Mixed hyperlipidemia            -Maximize medical therapy--aspirin 81 mg p.o. daily, Lopressor 50 mg p.o. twice daily, Cardizem  mg p.o. daily, Entresto 24/26 mg p.o. twice daily, Lasix 20 mg p.o. daily, Lipitor 20 mg tablet p.o. daily at bedtime, OAC with Xarelto 15 mg p.o. daily (dose was reduced back in 5/2021 as CrCl<51)  -Consider discontinuing lasix in future if renal dysfunction persists/worsens as patient appears euvolemic  -Heart Failure appears compensated at this time. No need for further medication adjustments    Decrease metoprolol to 25 mg twice daily given bradycardia and sinus pause. Consider p.m. given bradycardia and 3.2-second pause. Patient reluctant at this time    -Cardiac/<2 gram sodium diet recommended  -Recommend 2000mL daily fluid restriction   -Instructed patient to weigh self daily every morning upon waking and keep log book of daily weights.  Notify office if gaining more than 3 pounds in 48 hours. -Advised patient to notify office immediately if experiencing any progressive SOB, orthopnea, PND, LE edema or weight gain  -Educated patient on importance of fluid and salt restriction as well as lifestyle modification  -Maintain outpatient follow-up with pulmonology, Dr. Peter Bright routine outpatient follow-up with general cardiologist, Dr. Ramírez Michelle appt 5/19/23  Echocardiogram 10/21/2021: normal LV systolic function with EF of 50%, 1-2+ MR, mild TR, RVSP 40   mmHg, moderately dilated left atrium  C 6/26/20: Normal coronaries  -Maintain routine outpatient follow-up with PCP  -F/U with CHF clinic in 1 year or sooner if needed        Counseling: The importance of daily weights, dietary sodium restriction, and contact with cardiology if weight is increased more than 3 lbs in any 48 hour period was stressed. The patient has been advised to contact us if theyexperience progressive SOB, orthopnea, PND or progressive edema.

## 2023-03-09 ENCOUNTER — OFFICE VISIT (OUTPATIENT)
Dept: CARDIOLOGY CLINIC | Age: 88
End: 2023-03-09

## 2023-03-09 VITALS
HEART RATE: 51 BPM | SYSTOLIC BLOOD PRESSURE: 126 MMHG | DIASTOLIC BLOOD PRESSURE: 80 MMHG | WEIGHT: 165 LBS | BODY MASS INDEX: 31.18 KG/M2

## 2023-03-09 DIAGNOSIS — I48.0 PAROXYSMAL ATRIAL FIBRILLATION (HCC): Primary | ICD-10-CM

## 2023-03-09 ASSESSMENT — ENCOUNTER SYMPTOMS
SHORTNESS OF BREATH: 1
GASTROINTESTINAL NEGATIVE: 1
WHEEZING: 0
ALLERGIC/IMMUNOLOGIC NEGATIVE: 1
VOMITING: 0
EYES NEGATIVE: 1
ABDOMINAL PAIN: 0
NAUSEA: 0

## 2023-03-09 NOTE — PROGRESS NOTES
3/9/2023      HPI:      20: : Patient is a 80 y.o. female who presents with a chief complaint of fatigue. Patient is followed on a regular basis by Dr. Zara Lee MD.  Patient with past medical history of hypertension and hyperlipidemia. Recently returned from [place] 2 weeks ago. She complains of not feeling well and fatiguing very easily. She also complains of shortness of breath with minimal exertion. She complains of right tibial leg pain. She denies any history of myocardial infarction, congestive heart failure or arrhythmia. She denies any history of stress test or cardiac catheterization. She denies history of diabetes or tobacco abuse. He was noted to be in new onset atrial fibrillation with rapid ventricle response and placed on IV Cardizem drip and has since spontaneously converted to normal sinus rhythm. CT of the chest shows no evidence of pulmonary believes him, mild cardiomegaly, coronary calcifications as well is reflux of contrast into the hepatic vein suggesting a degree of heart failure. There is small to moderate bilateral pleural effusions. Also noted is a left lower lobe infiltrate/pneumonia. Her second troponin is mildly elevated at 0.03. Blood pressure significant elevated on presentation at 159/102.    20: converted to NSR. S/p BEBE/LHC and RHC with normal coronaries, mod PHTN, mod MR and severe TR. denies any chest pain or shortness of breath at this time. He is on Lovenox subcu full dose and IV Lasix. 20: Echo with EF of 35-40%, severe TR and mod to severe MR, RVSP of 60mmHg. Doing ok today. Remains in afib with RVR on tele. 20  Wallace Barger (:  1935) has requested an audio/video evaluation for the following concern(s):    Status post hospitalization for acute decompensated heart failure and new onset atrial fibrillation. She states she is improving every day. She is feeling better.   Was placed on oral anticoagulation, Lopressor, Lipitor, Lasix p.o. Is on Cardizem  mg daily she was placed on Entresto. Pt denies chest pain, dyspnea, dyspnea on exertion, change in exercise capacity, fatigue,  nausea, vomiting, diarrhea, constipation, motor weakness, insomnia, weight loss, syncope, dizziness, lightheadedness, palpitations, PND, orthopnea, or claudication. He is compliant with her medications. No bleeding issues. 10-13-20: Post echocardiogram on October 2, 2020 with ejection fraction of 50%, improved as compared to echo on 6 of 2020. Moderate pulmonary retention with an RVSP 59 mmHg, mild mitral regurgitation. Patient is feeling well overall. She denies any symptoms of angina or congestive heart failure. She is compliant with her medications. She is on Xarelto 20 mg daily and denies any bleeding issues. No recent hospitalizations. She is hemodynamically stable today. She is following up with CHF clinic. EKG today with normal sinus rhythm, left bundle branch block, normal QTc interval.    10-8-21: Patient with history of recovered cardiomyopathy ejection fraction of 50%. History of cardiac catheterization in June 2020 with normal coronary arteries moderate pulmonary tension. She is follow-up with CHF clinic. No CHF type symptoms there is no anginal type symptoms. She is compliant with her medications. Patient with history of paroxysmal atrial fibrillation on oral anticoagulation. EKG today with atrial flutter    5/13/2022: Patient with history of recovered cardiomyopathy ejection fraction of 50%. History of cardiac catheterization in 2020 with normal coronaries and moderate pulmonary hypertension. Patient is feeling well overall she denies any CHF or angina type symptoms. She is compliant with her medications. History of paroxysmal atrial fibrillation, she remains on oral anticoagulation and denies any bleeding issues. Blood pressure is within normal limits and heart rate is 50  EKG with sinus bradycardia, PAC. Most recent echo in October 2021 ejection fraction of 50%, 1-2+ mitral regurgitation, mild pulmonary pretension with an RVSP of 40 mm         2/6/2023: Patient seen in office today for follow-up visit to discuss recent test results. Patient wore 2-week Zio patch due to complaints of fatigue and bradycardia noted on EKGs. Zio showed predominant underlying rhythm was sinus rhythm. A-fib burden of 1%. Patient noted to have 1 cardiac pause lasting 3.2 seconds. Patient reports she feels like she has had more energy recently. States she has been feeling okay overall. Denies any episodes of chest pain, shortness of breath, dizziness, lightheadedness, syncope. We discussed cardiac pauses and indications for permanent pacemaker. At this time patient reports given her age she is not interested in having a pacemaker. She is agreeable to adjusting medications and discussing this with Dr. Leslie Nguyen in a few months. She is also requesting samples of Xarelto and Entresto which were provided to her.         3-9-23: Patient is doing okay overall. No syncopal episodes since last office visit. Previous 2-week Zio patch showing 3.2-second pauses and less than 1% A-fib burden. EKG with normal sinus rhythm/PACs in the office today. She does not appear to have be having any malignant arrhythmia type symptoms. Patient with history of paroxysmal atrial fibrillation. She is currently on metoprolol as well as Cardizem. Her Lopressor was lowered to 25 mg from 50 previous office visit. She is on a full dose anticoagulation. Patient with history of recovered cardiomyopathy, nonischemic    Review of Systems   Constitutional: Negative. Negative for chills and fever. HENT: Negative. Eyes: Negative. Respiratory:  Positive for shortness of breath. Negative for wheezing. Cardiovascular:  Negative for chest pain, palpitations and leg swelling. Gastrointestinal: Negative.   Negative for abdominal pain, nausea and vomiting. Endocrine: Negative. Genitourinary: Negative. Musculoskeletal: Negative. Skin: Negative. Negative for rash. Allergic/Immunologic: Negative. Neurological:  Negative for dizziness, weakness and headaches. Psychiatric/Behavioral: Negative. Prior to Visit Medications    Medication Sig Taking? Authorizing Provider   ketoconazole (NIZORAL) 2 % shampoo APPLY TOPICALLY 3 TIMES A WEEK AS DIRECTED Yes Historical Provider, MD   metoprolol tartrate (LOPRESSOR) 50 MG tablet Take 0.5 tablets by mouth 2 times daily Yes ART Franks CNP   ENTRESTO 24-26 MG per tablet TAKE 1 TABLET BY MOUTH  TWICE DAILY Yes ART Franks CNP   furosemide (LASIX) 20 MG tablet Take 1 tablet by mouth daily Yes [facility] Holiday, DO   donepezil (ARICEPT) 10 MG tablet 10 mg nightly Yes Historical Provider, MD   sertraline (ZOLOFT) 25 MG tablet Take 25 mg by mouth daily Yes Historical Provider, MD   Cholecalciferol (VITAMIN D3) 50 MCG (2000 UT) CAPS Take by mouth Yes Historical Provider, MD   atorvastatin (LIPITOR) 20 MG tablet TAKE 1 TABLET BY MOUTH AT  NIGHT Yes ART Franks CNP   dilTIAZem (CARDIZEM CD) 120 MG extended release capsule TAKE 1 CAPSULE BY MOUTH  DAILY Yes ART Franks CNP   rivaroxaban (XARELTO) 15 MG TABS tablet Take 1 tablet by mouth daily (with breakfast) Yes [facility] Holiday, DO   acetaminophen (TYLENOL) 325 MG tablet Take 650 mg by mouth every 6 hours as needed. Yes Historical Provider, MD   oxybutynin (DITROPAN) 5 MG tablet Take 1 tablet by mouth 3 times daily. Yes Sara Larson MD   diphenhydrAMINE (BENADRYL) 25 MG tablet Take 25 mg by mouth nightly as needed For sleep Yes Historical Provider, MD   aspirin 81 MG EC tablet Take 81 mg by mouth daily. Yes Historical Provider, MD   multivitamin (ANTIOXIDANT;PROSIGHT) TABS per tablet Take 1 tablet by mouth daily.    Yes Historical Provider, MD       Social History     Tobacco Use    Smoking status: Never Smokeless tobacco: Never   Vaping Use    Vaping Use: Never used   Substance Use Topics    Alcohol use: No    Drug use: Never        Allergies   Allergen Reactions    Remeron [Mirtazapine] Hallucinations   ,   Past Medical History:   Diagnosis Date    Atrial fibrillation (HCC)     Cardiomyopathy, nonischemic ([address] 10/13/2020    Depression     Elevated glucose     HTN (hypertension)     Hyperlipidemia     Osteopenia     Hips    Pulmonary hypertension ([address] 10/13/2020    Rheumatic heart disease     as a child    Rib fractures     Secondary to AA   ,   Past Surgical History:   Procedure Laterality Date    APPENDECTOMY       SECTION      COLONOSCOPY      Internal hemorrhoids    COLONOSCOPY      normal    CT NEEDLE BIOPSY LUNG PERCUTANEOUS  2020    CT NEEDLE BIOPSY LUNG PERCUTANEOUS 2020 [facility] CT SCAN    EYE SURGERY      HYSTERECTOMY (CERVIX STATUS UNKNOWN)     ,   Family History   Problem Relation Age of Onset    Heart Disease Mother         Valvular heart disease from childhood rheumatic fever    Diabetes Maternal Aunt      Physical Exam  Constitutional:       General: She is not in acute distress. Appearance: Normal appearance. HENT:      Head: Normocephalic and atraumatic. Neck:      Musculoskeletal: Normal range of motion and neck supple. Vascular: No carotid bruit. Cardiovascular:      Rate and Rhythm: Normal rate and regular rhythm. Heart sounds: No murmur. Pulmonary:      Effort: Pulmonary effort is normal. No respiratory distress. Breath sounds: Normal breath sounds. No wheezing, rhonchi or rales. Abdominal:      Palpations: Abdomen is soft. Tenderness: There is no abdominal tenderness. Musculoskeletal: Normal range of motion. Right lower leg: No edema. Left lower leg: No edema. Skin:     General: Skin is warm and dry. Neurological:      General: No focal deficit present.       Mental Status: She is alert and oriented to person, place, and time.      Cranial Nerves: No cranial nerve deficit. Psychiatric:         Mood and Affect: Mood normal.         Behavior: Behavior normal.       ASSESSMENT:        Diagnosis Orders   1. Paroxysmal atrial fibrillation (HCC)  EKG 12 Lead        1. New onset DM-bch-spkeqbugu to normal sinus rhythm spontaneously. 2. Dyspnea secondary to acute decompensated combined heart failure  3. New onset nonischemic cardiomyopathy ejection fraction of 35 to 40%-recovered with ejection fraction of 50%. 5. Bilateral pleural effusions-resolved  6. HTN  7. Dyslipidemia  8. Obesity  9. JOSE ANTONIO lung nodule per CTA of the chest 6/24/2020  10. Abnormal EKG/LBBB  11. Mild mitral regurgitation  12. Moderate to severe pulmonary pretension, RVSP of 60 mmHg. Related to diastolic heart failure, valvular heart disease and cardiomyopathy. PLAN:    The nature of cardiac risk has been fully discussed with this patient. I have made her aware of her LDL target goal given her cardiovascular risk analysis. I have discussed the appropriate diet. The need for lifelong compliance in order to reduce risk is stressed. A regular exercise program is recommended to help achieve and maintain normal body weight, fitness and improve lipid balance. Patient was advised and encouraged to check blood pressure at home or at a pharmacy, maintain a logbook, and also call us back if blood pressure are above the target ranges or if it is low. Patient clearly understands and agrees to the instructions. We will need to continue to monitor muscle and liver enzymes, BUN, CR, and electrolytes. Cont with DOAC    Recheck echo in in future for LV function if warranted by symptom    Follow up with CHF clinic. Check EKG today and next office visit. Heart rate control approach. Patient does not sense she is in atrial fib/flutter. Heart rate is controlled. She is on oral anticoagulation.   If develops any significant symptoms then will consider cardioversion/antiarrhythmic medications or even pacemaker placement. The importance of daily weights, dietary sodium restriction, and contact with cardiology if weight is increased more than 3 lbs in any 48 hour period was stressed. The patient has been advised to contact us if they experience progressive SOB, orthopnea, PND or progressive edema. Details of medical condition explained and patient was warned about adverse consequences of uncontrolled medical conditions and possible side effects of prescribed medications. No follow-ups on file. An  electronic signature was used to authenticate this note.     --Ar Monte DO on 3/9/2023 at 12:26 PM  9}

## 2023-04-17 ENCOUNTER — TELEPHONE (OUTPATIENT)
Dept: CARDIOLOGY CLINIC | Age: 88
End: 2023-04-17

## 2023-05-01 ENCOUNTER — OFFICE VISIT (OUTPATIENT)
Dept: CARDIOLOGY CLINIC | Age: 88
End: 2023-05-01
Payer: MEDICARE

## 2023-05-01 VITALS
WEIGHT: 166.6 LBS | BODY MASS INDEX: 31.48 KG/M2 | HEART RATE: 82 BPM | SYSTOLIC BLOOD PRESSURE: 136 MMHG | DIASTOLIC BLOOD PRESSURE: 70 MMHG

## 2023-05-01 DIAGNOSIS — R00.1 BRADYCARDIA: Primary | ICD-10-CM

## 2023-05-01 DIAGNOSIS — I49.5 SICK SINUS SYNDROME (HCC): ICD-10-CM

## 2023-05-01 PROCEDURE — 99213 OFFICE O/P EST LOW 20 MIN: CPT | Performed by: NURSE PRACTITIONER

## 2023-05-01 PROCEDURE — 1123F ACP DISCUSS/DSCN MKR DOCD: CPT | Performed by: NURSE PRACTITIONER

## 2023-05-01 PROCEDURE — 1090F PRES/ABSN URINE INCON ASSESS: CPT | Performed by: NURSE PRACTITIONER

## 2023-05-01 PROCEDURE — G8427 DOCREV CUR MEDS BY ELIG CLIN: HCPCS | Performed by: NURSE PRACTITIONER

## 2023-05-01 PROCEDURE — G8417 CALC BMI ABV UP PARAM F/U: HCPCS | Performed by: NURSE PRACTITIONER

## 2023-05-01 PROCEDURE — 1036F TOBACCO NON-USER: CPT | Performed by: NURSE PRACTITIONER

## 2023-05-01 PROCEDURE — 93000 ELECTROCARDIOGRAM COMPLETE: CPT | Performed by: NURSE PRACTITIONER

## 2023-05-01 RX ORDER — SODIUM CHLORIDE 0.9 % (FLUSH) 0.9 %
5-40 SYRINGE (ML) INJECTION PRN
OUTPATIENT
Start: 2023-05-01

## 2023-05-01 RX ORDER — SODIUM CHLORIDE 9 MG/ML
INJECTION, SOLUTION INTRAVENOUS PRN
OUTPATIENT
Start: 2023-05-01

## 2023-05-01 RX ORDER — PREDNISONE 1 MG/1
50 TABLET ORAL ONCE
OUTPATIENT
Start: 2023-05-01 | End: 2023-05-01

## 2023-05-01 RX ORDER — ASPIRIN 81 MG/1
81 TABLET ORAL ONCE
OUTPATIENT
Start: 2023-05-01 | End: 2023-05-01

## 2023-05-01 RX ORDER — ONDANSETRON 2 MG/ML
4 INJECTION INTRAMUSCULAR; INTRAVENOUS EVERY 6 HOURS PRN
OUTPATIENT
Start: 2023-05-01

## 2023-05-01 RX ORDER — NITROGLYCERIN 0.4 MG/1
0.4 TABLET SUBLINGUAL EVERY 5 MIN PRN
OUTPATIENT
Start: 2023-05-01

## 2023-05-01 RX ORDER — SODIUM CHLORIDE 0.9 % (FLUSH) 0.9 %
5-40 SYRINGE (ML) INJECTION EVERY 12 HOURS SCHEDULED
OUTPATIENT
Start: 2023-05-01

## 2023-05-01 RX ORDER — DIPHENHYDRAMINE HCL 25 MG
50 TABLET ORAL ONCE
OUTPATIENT
Start: 2023-05-01 | End: 2023-05-01

## 2023-05-01 ASSESSMENT — ENCOUNTER SYMPTOMS
ALLERGIC/IMMUNOLOGIC NEGATIVE: 1
GASTROINTESTINAL NEGATIVE: 1
WHEEZING: 0
ABDOMINAL PAIN: 0
EYES NEGATIVE: 1
VOMITING: 0
SHORTNESS OF BREATH: 1
NAUSEA: 0

## 2023-05-01 NOTE — PROGRESS NOTES
HPI:      20: : Patient is a 80 y.o. female who presents with a chief complaint of fatigue. Patient is followed on a regular basis by Dr. Carolina Dasilva MD.  Patient with past medical history of hypertension and hyperlipidemia. Recently returned from [place] 2 weeks ago. She complains of not feeling well and fatiguing very easily. She also complains of shortness of breath with minimal exertion. She complains of right tibial leg pain. She denies any history of myocardial infarction, congestive heart failure or arrhythmia. She denies any history of stress test or cardiac catheterization. She denies history of diabetes or tobacco abuse. He was noted to be in new onset atrial fibrillation with rapid ventricle response and placed on IV Cardizem drip and has since spontaneously converted to normal sinus rhythm. CT of the chest shows no evidence of pulmonary believes him, mild cardiomegaly, coronary calcifications as well is reflux of contrast into the hepatic vein suggesting a degree of heart failure. There is small to moderate bilateral pleural effusions. Also noted is a left lower lobe infiltrate/pneumonia. Her second troponin is mildly elevated at 0.03. Blood pressure significant elevated on presentation at 159/102.    20: converted to NSR. S/p BEBE/LHC and RHC with normal coronaries, mod PHTN, mod MR and severe TR. denies any chest pain or shortness of breath at this time. He is on Lovenox subcu full dose and IV Lasix. 20: Echo with EF of 35-40%, severe TR and mod to severe MR, RVSP of 60mmHg. Doing ok today. Remains in afib with RVR on tele. 20  Abhi Morales (:  1935) has requested an audio/video evaluation for the following concern(s):    Status post hospitalization for acute decompensated heart failure and new onset atrial fibrillation. She states she is improving every day. She is feeling better.   Was placed on oral anticoagulation, Lopressor, Lipitor,

## 2023-05-10 ENCOUNTER — HOSPITAL ENCOUNTER (OUTPATIENT)
Dept: CARDIAC CATH/INVASIVE PROCEDURES | Age: 88
Setting detail: OBSERVATION
Discharge: HOME | End: 2023-05-11
Attending: INTERNAL MEDICINE | Admitting: INTERNAL MEDICINE
Payer: MEDICARE

## 2023-05-10 ENCOUNTER — APPOINTMENT (OUTPATIENT)
Dept: GENERAL RADIOLOGY | Age: 88
End: 2023-05-10
Attending: INTERNAL MEDICINE
Payer: MEDICARE

## 2023-05-10 PROBLEM — Z95.0 PACEMAKER: Status: ACTIVE | Noted: 2023-05-10

## 2023-05-10 PROBLEM — I49.5 SSS (SICK SINUS SYNDROME) (HCC): Status: ACTIVE | Noted: 2023-05-10

## 2023-05-10 LAB
ANION GAP SERPL CALCULATED.3IONS-SCNC: 13 MEQ/L (ref 9–15)
BUN SERPL-MCNC: 16 MG/DL (ref 8–23)
CALCIUM SERPL-MCNC: 9.2 MG/DL (ref 8.5–9.9)
CHLORIDE SERPL-SCNC: 100 MEQ/L (ref 95–107)
CO2 SERPL-SCNC: 25 MEQ/L (ref 20–31)
CREAT SERPL-MCNC: 1.16 MG/DL (ref 0.5–0.9)
ERYTHROCYTE [DISTWIDTH] IN BLOOD BY AUTOMATED COUNT: 13.7 % (ref 11.5–14.5)
GLUCOSE SERPL-MCNC: 176 MG/DL (ref 70–99)
HCT VFR BLD AUTO: 43.9 % (ref 37–47)
HGB BLD-MCNC: 14.9 G/DL (ref 12–16)
MCH RBC QN AUTO: 33 PG (ref 27–31.3)
MCHC RBC AUTO-ENTMCNC: 34 % (ref 33–37)
MCV RBC AUTO: 97.2 FL (ref 79.4–94.8)
PLATELET # BLD AUTO: 277 K/UL (ref 130–400)
POTASSIUM SERPL-SCNC: 3.9 MEQ/L (ref 3.4–4.9)
RBC # BLD AUTO: 4.52 M/UL (ref 4.2–5.4)
SODIUM SERPL-SCNC: 138 MEQ/L (ref 135–144)
WBC # BLD AUTO: 10.7 K/UL (ref 4.8–10.8)

## 2023-05-10 PROCEDURE — 2580000003 HC RX 258

## 2023-05-10 PROCEDURE — 2709999900 HC NON-CHARGEABLE SUPPLY

## 2023-05-10 PROCEDURE — 80048 BASIC METABOLIC PNL TOTAL CA: CPT

## 2023-05-10 PROCEDURE — C1785 PMKR, DUAL, RATE-RESP: HCPCS

## 2023-05-10 PROCEDURE — G0378 HOSPITAL OBSERVATION PER HR: HCPCS

## 2023-05-10 PROCEDURE — C1894 INTRO/SHEATH, NON-LASER: HCPCS

## 2023-05-10 PROCEDURE — 71045 X-RAY EXAM CHEST 1 VIEW: CPT

## 2023-05-10 PROCEDURE — 6370000000 HC RX 637 (ALT 250 FOR IP): Performed by: INTERNAL MEDICINE

## 2023-05-10 PROCEDURE — C1898 LEAD, PMKR, OTHER THAN TRANS: HCPCS

## 2023-05-10 PROCEDURE — 33208 INSRT HEART PM ATRIAL & VENT: CPT

## 2023-05-10 PROCEDURE — 6360000002 HC RX W HCPCS: Performed by: INTERNAL MEDICINE

## 2023-05-10 PROCEDURE — 6360000002 HC RX W HCPCS

## 2023-05-10 PROCEDURE — 2580000003 HC RX 258: Performed by: NURSE PRACTITIONER

## 2023-05-10 PROCEDURE — 2580000003 HC RX 258: Performed by: INTERNAL MEDICINE

## 2023-05-10 PROCEDURE — 85027 COMPLETE CBC AUTOMATED: CPT

## 2023-05-10 PROCEDURE — 2500000003 HC RX 250 WO HCPCS

## 2023-05-10 RX ORDER — SODIUM CHLORIDE 9 MG/ML
INJECTION, SOLUTION INTRAVENOUS PRN
Status: DISCONTINUED | OUTPATIENT
Start: 2023-05-10 | End: 2023-05-11 | Stop reason: HOSPADM

## 2023-05-10 RX ORDER — SERTRALINE HYDROCHLORIDE 25 MG/1
25 TABLET, FILM COATED ORAL DAILY
Status: DISCONTINUED | OUTPATIENT
Start: 2023-05-10 | End: 2023-05-11 | Stop reason: HOSPADM

## 2023-05-10 RX ORDER — NITROGLYCERIN 0.4 MG/1
0.4 TABLET SUBLINGUAL EVERY 5 MIN PRN
Status: DISCONTINUED | OUTPATIENT
Start: 2023-05-10 | End: 2023-05-11 | Stop reason: HOSPADM

## 2023-05-10 RX ORDER — DIPHENHYDRAMINE HCL 25 MG
50 TABLET ORAL ONCE
Status: DISCONTINUED | OUTPATIENT
Start: 2023-05-10 | End: 2023-05-11 | Stop reason: HOSPADM

## 2023-05-10 RX ORDER — ASPIRIN 81 MG/1
81 TABLET ORAL ONCE
Status: DISCONTINUED | OUTPATIENT
Start: 2023-05-10 | End: 2023-05-11 | Stop reason: HOSPADM

## 2023-05-10 RX ORDER — PREDNISONE 50 MG/1
50 TABLET ORAL ONCE
Status: DISCONTINUED | OUTPATIENT
Start: 2023-05-10 | End: 2023-05-11 | Stop reason: HOSPADM

## 2023-05-10 RX ORDER — ACETAMINOPHEN 325 MG/1
650 TABLET ORAL EVERY 4 HOURS PRN
Status: DISCONTINUED | OUTPATIENT
Start: 2023-05-10 | End: 2023-05-11 | Stop reason: HOSPADM

## 2023-05-10 RX ORDER — ONDANSETRON 2 MG/ML
4 INJECTION INTRAMUSCULAR; INTRAVENOUS EVERY 6 HOURS PRN
Status: DISCONTINUED | OUTPATIENT
Start: 2023-05-10 | End: 2023-05-11 | Stop reason: HOSPADM

## 2023-05-10 RX ORDER — OXYBUTYNIN CHLORIDE 5 MG/1
5 TABLET ORAL 3 TIMES DAILY
Status: DISCONTINUED | OUTPATIENT
Start: 2023-05-10 | End: 2023-05-11 | Stop reason: HOSPADM

## 2023-05-10 RX ORDER — DONEPEZIL HYDROCHLORIDE 10 MG/1
10 TABLET, FILM COATED ORAL NIGHTLY
Status: DISCONTINUED | OUTPATIENT
Start: 2023-05-10 | End: 2023-05-11 | Stop reason: HOSPADM

## 2023-05-10 RX ORDER — SODIUM CHLORIDE 0.9 % (FLUSH) 0.9 %
5-40 SYRINGE (ML) INJECTION EVERY 12 HOURS SCHEDULED
Status: DISCONTINUED | OUTPATIENT
Start: 2023-05-10 | End: 2023-05-11 | Stop reason: HOSPADM

## 2023-05-10 RX ORDER — CEFAZOLIN SODIUM IN 0.9 % NACL 2 G/100 ML
2000 PLASTIC BAG, INJECTION (ML) INTRAVENOUS EVERY 8 HOURS
Status: DISCONTINUED | OUTPATIENT
Start: 2023-05-10 | End: 2023-05-10

## 2023-05-10 RX ORDER — ASPIRIN 81 MG/1
81 TABLET ORAL DAILY
Status: DISCONTINUED | OUTPATIENT
Start: 2023-05-10 | End: 2023-05-11 | Stop reason: HOSPADM

## 2023-05-10 RX ORDER — SODIUM CHLORIDE 0.9 % (FLUSH) 0.9 %
5-40 SYRINGE (ML) INJECTION PRN
Status: DISCONTINUED | OUTPATIENT
Start: 2023-05-10 | End: 2023-05-11 | Stop reason: HOSPADM

## 2023-05-10 RX ORDER — ATORVASTATIN CALCIUM 20 MG/1
20 TABLET, FILM COATED ORAL NIGHTLY
Status: DISCONTINUED | OUTPATIENT
Start: 2023-05-10 | End: 2023-05-11 | Stop reason: HOSPADM

## 2023-05-10 RX ORDER — DILTIAZEM HYDROCHLORIDE 120 MG/1
120 CAPSULE, COATED, EXTENDED RELEASE ORAL DAILY
Status: DISCONTINUED | OUTPATIENT
Start: 2023-05-10 | End: 2023-05-11 | Stop reason: HOSPADM

## 2023-05-10 RX ORDER — ONDANSETRON 2 MG/ML
4 INJECTION INTRAMUSCULAR; INTRAVENOUS EVERY 6 HOURS PRN
Status: DISCONTINUED | OUTPATIENT
Start: 2023-05-10 | End: 2023-05-10 | Stop reason: SDUPTHER

## 2023-05-10 RX ORDER — FUROSEMIDE 20 MG/1
20 TABLET ORAL DAILY
Status: DISCONTINUED | OUTPATIENT
Start: 2023-05-10 | End: 2023-05-11 | Stop reason: HOSPADM

## 2023-05-10 RX ADMIN — DONEPEZIL HYDROCHLORIDE 10 MG: 10 TABLET, FILM COATED ORAL at 20:32

## 2023-05-10 RX ADMIN — SACUBITRIL AND VALSARTAN 1 TABLET: 24; 26 TABLET, FILM COATED ORAL at 13:10

## 2023-05-10 RX ADMIN — SODIUM CHLORIDE: 9 INJECTION, SOLUTION INTRAVENOUS at 08:06

## 2023-05-10 RX ADMIN — OXYBUTYNIN CHLORIDE 5 MG: 5 TABLET ORAL at 20:32

## 2023-05-10 RX ADMIN — SERTRALINE HYDROCHLORIDE 25 MG: 25 TABLET ORAL at 13:10

## 2023-05-10 RX ADMIN — DILTIAZEM HYDROCHLORIDE 120 MG: 120 CAPSULE, COATED, EXTENDED RELEASE ORAL at 13:15

## 2023-05-10 RX ADMIN — SACUBITRIL AND VALSARTAN 1 TABLET: 24; 26 TABLET, FILM COATED ORAL at 20:32

## 2023-05-10 RX ADMIN — FUROSEMIDE 20 MG: 20 TABLET ORAL at 13:10

## 2023-05-10 RX ADMIN — CEFAZOLIN 1000 MG: 1 INJECTION, POWDER, FOR SOLUTION INTRAMUSCULAR; INTRAVENOUS at 08:27

## 2023-05-10 RX ADMIN — Medication 10 ML: at 11:57

## 2023-05-10 RX ADMIN — ASPIRIN 81 MG: 81 TABLET, COATED ORAL at 13:10

## 2023-05-10 RX ADMIN — OXYBUTYNIN CHLORIDE 5 MG: 5 TABLET ORAL at 13:10

## 2023-05-10 RX ADMIN — ATORVASTATIN CALCIUM 20 MG: 20 TABLET, FILM COATED ORAL at 20:32

## 2023-05-10 RX ADMIN — Medication 10 ML: at 20:32

## 2023-05-10 NOTE — BRIEF OP NOTE
Section of Cardiology  Adult Brief Pacemaker Procedure Note        Procedure(s):  Pacemaker, dual chamber, medtronic    Pre-operative Diagnosis:  SSS    H&P Status: Completed and reviewed.      Post-operative Diagnosis:  Successful PPM placement    Findings: see full report    Complications:  none      Primary Proceduralist:   Dr. Sheryl Sierra       Full procedure note to follow

## 2023-05-10 NOTE — ACP (ADVANCE CARE PLANNING)
Advance Care Planning   Healthcare Decision Maker:  DAUGHTER [phone]    Click here to complete Healthcare Decision Makers including selection of the Healthcare Decision Maker Relationship (ie \"Primary\").

## 2023-05-10 NOTE — PROGRESS NOTES
Arrived to pre/post from the cath lab and report from [employer]. Left pectoral chest dressing dry and intact with no bleeding or hematoma, sling in place. Attached to monitor and vitals are stable. Resting comfortably with no complaints of any pain or shortness of breath.   Will continue to monitor

## 2023-05-11 VITALS
HEART RATE: 100 BPM | RESPIRATION RATE: 16 BRPM | HEIGHT: 61 IN | OXYGEN SATURATION: 95 % | TEMPERATURE: 97.6 F | BODY MASS INDEX: 29.11 KG/M2 | DIASTOLIC BLOOD PRESSURE: 79 MMHG | SYSTOLIC BLOOD PRESSURE: 173 MMHG | WEIGHT: 154.2 LBS

## 2023-05-11 LAB
ANION GAP SERPL CALCULATED.3IONS-SCNC: 10 MEQ/L (ref 9–15)
BUN SERPL-MCNC: 14 MG/DL (ref 8–23)
CALCIUM SERPL-MCNC: 8.9 MG/DL (ref 8.5–9.9)
CHLORIDE SERPL-SCNC: 105 MEQ/L (ref 95–107)
CO2 SERPL-SCNC: 27 MEQ/L (ref 20–31)
CREAT SERPL-MCNC: 1.04 MG/DL (ref 0.5–0.9)
ERYTHROCYTE [DISTWIDTH] IN BLOOD BY AUTOMATED COUNT: 13.1 % (ref 11.5–14.5)
GLUCOSE SERPL-MCNC: 142 MG/DL (ref 70–99)
HCT VFR BLD AUTO: 39.4 % (ref 37–47)
HGB BLD-MCNC: 13.4 G/DL (ref 12–16)
MCH RBC QN AUTO: 32.8 PG (ref 27–31.3)
MCHC RBC AUTO-ENTMCNC: 33.9 % (ref 33–37)
MCV RBC AUTO: 96.6 FL (ref 79.4–94.8)
PLATELET # BLD AUTO: 225 K/UL (ref 130–400)
POTASSIUM SERPL-SCNC: 3.9 MEQ/L (ref 3.4–4.9)
RBC # BLD AUTO: 4.08 M/UL (ref 4.2–5.4)
SODIUM SERPL-SCNC: 142 MEQ/L (ref 135–144)
WBC # BLD AUTO: 9.7 K/UL (ref 4.8–10.8)

## 2023-05-11 PROCEDURE — 6370000000 HC RX 637 (ALT 250 FOR IP): Performed by: INTERNAL MEDICINE

## 2023-05-11 PROCEDURE — G0378 HOSPITAL OBSERVATION PER HR: HCPCS

## 2023-05-11 PROCEDURE — 80048 BASIC METABOLIC PNL TOTAL CA: CPT

## 2023-05-11 PROCEDURE — 2700000000 HC OXYGEN THERAPY PER DAY

## 2023-05-11 PROCEDURE — 93280 PM DEVICE PROGR EVAL DUAL: CPT

## 2023-05-11 PROCEDURE — 85027 COMPLETE CBC AUTOMATED: CPT

## 2023-05-11 PROCEDURE — 36415 COLL VENOUS BLD VENIPUNCTURE: CPT

## 2023-05-11 PROCEDURE — 2580000003 HC RX 258: Performed by: NURSE PRACTITIONER

## 2023-05-11 PROCEDURE — 2580000003 HC RX 258: Performed by: INTERNAL MEDICINE

## 2023-05-11 RX ADMIN — DILTIAZEM HYDROCHLORIDE 120 MG: 120 CAPSULE, COATED, EXTENDED RELEASE ORAL at 08:31

## 2023-05-11 RX ADMIN — SERTRALINE HYDROCHLORIDE 25 MG: 25 TABLET ORAL at 08:31

## 2023-05-11 RX ADMIN — FUROSEMIDE 20 MG: 20 TABLET ORAL at 08:31

## 2023-05-11 RX ADMIN — Medication 10 ML: at 08:32

## 2023-05-11 RX ADMIN — SODIUM CHLORIDE, PRESERVATIVE FREE 10 ML: 5 INJECTION INTRAVENOUS at 08:33

## 2023-05-11 RX ADMIN — OXYBUTYNIN CHLORIDE 5 MG: 5 TABLET ORAL at 08:31

## 2023-05-11 RX ADMIN — SACUBITRIL AND VALSARTAN 1 TABLET: 24; 26 TABLET, FILM COATED ORAL at 08:31

## 2023-05-11 RX ADMIN — ASPIRIN 81 MG: 81 TABLET, COATED ORAL at 08:31

## 2023-05-11 NOTE — PLAN OF CARE
Problem: Discharge Planning  Goal: Discharge to home or other facility with appropriate resources  5/11/2023 1009 by John Trivedi RN  Outcome: Progressing  5/11/2023 0406 by Chio Snider RN  Outcome: Progressing     Problem: Safety - Adult  Goal: Free from fall injury  5/11/2023 1009 by John Trivedi RN  Outcome: Progressing  5/11/2023 0406 by Chio Snider RN  Outcome: Progressing

## 2023-05-11 NOTE — PROGRESS NOTES
Pacemaker evaluation completed. Reviewed discharge paperwork with patient and daughter, verbalized understanding of medications and follow up appointments. Ivs removed, tips intact, no redness or swelling at site. DCD applied. Patient left unit with belongings via transport without incident.

## 2023-05-11 NOTE — PLAN OF CARE
Problem: Discharge Planning  Goal: Discharge to home or other facility with appropriate resources  5/11/2023 1009 by Keven Garcia RN  Outcome: Adequate for Discharge  5/11/2023 1009 by Keven Garcia RN  Outcome: Progressing  5/11/2023 0406 by Ryan Martinez RN  Outcome: Progressing     Problem: Safety - Adult  Goal: Free from fall injury  5/11/2023 1009 by Keven Garcia RN  Outcome: Adequate for Discharge  5/11/2023 1009 by Keven Garcia RN  Outcome: Progressing  5/11/2023 0406 by Ryan Martinez RN  Outcome: Progressing

## 2023-05-11 NOTE — CARE COORDINATION
DC PLAN HOME TODAY, NO NEEDS IDENTIFIED
with basic dialogue that supports the patient's individualized plan of care/goals and shares the quality data associated with the providers was provided to:     Patient Representative Name:       The Patient and/or Patient Representative Agree with the Discharge Plan?       Adonis Plascencia RN  Case Management Department

## 2023-05-11 NOTE — PROGRESS NOTES
Bedside device check completed at this time, Pt has a Medtronic dual lead pacemaker that was implanted yesterday. Presenting EGM displays AT-AF/VS- @ 100bpm in AAIR=DDDR mode. Pacing:   AP 0.2%   11.2%. 115 AT/AF (214-286 bpm/ 20sec.-02:36:10) (Ventricular rates during at/af 109-115bpm) AF [place] 93.3%. NO alerts to report, RA/RV impedance WNL, RV capture threshold WNL, RA sensing 0.5mV and already programmed to 0.15mV, unable to test RA threshold r/t AT/AF. Pt and daughter educated on maintaining dressing and follow up device care. Copy of report placed in bedside chart, and Dr. Castro Platt updated.

## 2023-05-11 NOTE — DISCHARGE SUMMARY
Discharge Summary    Date: 5/11/2023  Patient Name: Jeni Alfonso    YOB: 1935     Age: 80 y.o. Admit Date: 5/10/2023  Discharge Date: 5/11/2023  Discharge Condition: Good    Admission Diagnosis  Pacemaker [Z95.0];SSS (sick sinus syndrome) ([facility] [I49.5]      Discharge Diagnosis  Principal Problem:    Pacemaker  Active Problems:    SSS (sick sinus syndrome) ([facility] [address]  Resolved Problems:    * No resolved hospital problems. [facility] Stay  Narrative of Hospital Course:  Patient presented for elective pacemaker placement. Patient reported pacemaker placement, MRI compatible. No preoperative complications. Post procedure chest today was within the limits. He was discharged home in stable satisfactory condition. She decided to hold anticoagulant 3 days postprocedure. She was resumed on Cardizem as well as metoprolol    Consultants:  None    Surgeries/procedures Performed:  PPM     Treatments:    Cardiac Medications        Discharge Plan/Disposition:  Home    Hospital/Incidental Findings Requiring Follow Up:    Patient Instructions:    Diet: Cardiac Diet    Activity:Activity as Tolerated  For number of days (if applicable): Other Instructions:    Provider Follow-Up:   No follow-ups on file.      Significant Diagnostic Studies:    Recent Labs:  Admission on 05/10/2023  WBC                                           Date: 05/10/2023  Value: 10.7        Ref range: 4.8 - 10.8 K/uL    Status: Final  RBC                                           Date: 05/10/2023  Value: 4.52        Ref range: 4.20 - 5.40 M/uL   Status: Final  Hemoglobin                                    Date: 05/10/2023  Value: 14.9        Ref range: 12.0 - 16.0 g/dL   Status: Final  Hematocrit                                    Date: 05/10/2023  Value: 43.9        Ref range: 37.0 - 47.0 %      Status: Final  MCV                                           Date: 05/10/2023  Value: 97.2 (H)    Ref range: 79.4 - 94.8 fL     Status:

## 2023-05-15 NOTE — PROCEDURES
subcuticular stitch. The area was then cleansed with sterile saline and dried. TinCoBen was  applied and Steri-strips were overlaid. A sterile 4 x 4 pressure  dressing was applied and the patient was transferred to the post-cath  holding area in a stable satisfactory condition. The patient tolerated the procedure well. GENERATOR DATA:  Model number is [id number], serial number is [id number].  is Room n House. LEAD DATA:  RIGHT ATRIAL LEAD DATA:  Model number is 2262-50, serial number is  [id number]. RIGHT VENTRICLE LEAD DATA:  Model number is [id number], serial number is  [id number]. STIMULATION THRESHOLDS:  The right atrium, impedance _____ ohms, P-wave  sensing is 0.7 mV. For the right ventricle, voltage 0.8 V, impendence  855 ohms, R-wave sensing is 7.7 mV. PARAMETER SETTINGS:  The pacing mode is AAIR with mode switch to DDDR. Lower tracking rate is 60. Upper tracking rate is 120. ASSESSMENT:  Status post successful dual chamber permanent pacemaker  placement, MRI compatible.     PLAN:  Postprocedure care as usual.        Balwinder Roberts DO    D: 05/15/2023 12:42:21       T: 05/15/2023 13:12:56     [email or web address]  Job#: [id number]     Doc#: [id number]    CC: Awake

## 2023-05-22 ENCOUNTER — OFFICE VISIT (OUTPATIENT)
Dept: CARDIOLOGY CLINIC | Age: 88
End: 2023-05-22
Payer: MEDICARE

## 2023-05-22 VITALS — HEART RATE: 109 BPM | OXYGEN SATURATION: 92 % | DIASTOLIC BLOOD PRESSURE: 80 MMHG | SYSTOLIC BLOOD PRESSURE: 137 MMHG

## 2023-05-22 DIAGNOSIS — I48.0 PAROXYSMAL ATRIAL FIBRILLATION (HCC): ICD-10-CM

## 2023-05-22 DIAGNOSIS — I10 PRIMARY HYPERTENSION: ICD-10-CM

## 2023-05-22 DIAGNOSIS — I42.8 CARDIOMYOPATHY, NONISCHEMIC (HCC): ICD-10-CM

## 2023-05-22 DIAGNOSIS — I49.5 SSS (SICK SINUS SYNDROME) (HCC): ICD-10-CM

## 2023-05-22 DIAGNOSIS — I48.0 PAROXYSMAL ATRIAL FIBRILLATION (HCC): Primary | ICD-10-CM

## 2023-05-22 PROCEDURE — 1123F ACP DISCUSS/DSCN MKR DOCD: CPT | Performed by: NURSE PRACTITIONER

## 2023-05-22 PROCEDURE — G8427 DOCREV CUR MEDS BY ELIG CLIN: HCPCS | Performed by: NURSE PRACTITIONER

## 2023-05-22 PROCEDURE — G8417 CALC BMI ABV UP PARAM F/U: HCPCS | Performed by: NURSE PRACTITIONER

## 2023-05-22 PROCEDURE — 1036F TOBACCO NON-USER: CPT | Performed by: NURSE PRACTITIONER

## 2023-05-22 PROCEDURE — 93000 ELECTROCARDIOGRAM COMPLETE: CPT | Performed by: NURSE PRACTITIONER

## 2023-05-22 PROCEDURE — 1090F PRES/ABSN URINE INCON ASSESS: CPT | Performed by: NURSE PRACTITIONER

## 2023-05-22 PROCEDURE — 99213 OFFICE O/P EST LOW 20 MIN: CPT | Performed by: NURSE PRACTITIONER

## 2023-05-22 RX ORDER — DILTIAZEM HYDROCHLORIDE 60 MG/1
60 TABLET, FILM COATED ORAL DAILY
Qty: 120 TABLET | Refills: 3 | Status: SHIPPED | OUTPATIENT
Start: 2023-05-22 | End: 2023-05-22

## 2023-05-22 RX ORDER — DILTIAZEM HYDROCHLORIDE 60 MG/1
60 TABLET, FILM COATED ORAL DAILY
Qty: 120 TABLET | Refills: 3 | Status: SHIPPED | OUTPATIENT
Start: 2023-05-22

## 2023-05-22 ASSESSMENT — ENCOUNTER SYMPTOMS
GASTROINTESTINAL NEGATIVE: 1
ABDOMINAL PAIN: 0
NAUSEA: 0
ALLERGIC/IMMUNOLOGIC NEGATIVE: 1
VOMITING: 0
WHEEZING: 0
EYES NEGATIVE: 1
SHORTNESS OF BREATH: 1

## 2023-05-22 NOTE — TELEPHONE ENCOUNTER
Please approve or deny this refill request. The order is pended. Thank you.     LOV 5/22/2023    Next Visit Date:  Future Appointments   Date Time Provider Yuridia López   9/21/2023 12:30 PM DO Rajendra [facility] Rajendra   11/16/2023 11:00 AM NANY Nathan [facility] EMERGENCY [facility] AT [place]

## 2023-05-22 NOTE — PROGRESS NOTES
HPI:    20: : Patient is a 80 y.o. female who presents with a chief complaint of fatigue. Patient is followed on a regular basis by Dr. Arpit Pandey MD.  Patient with past medical history of hypertension and hyperlipidemia. Recently returned from [place] 2 weeks ago. She complains of not feeling well and fatiguing very easily. She also complains of shortness of breath with minimal exertion. She complains of right tibial leg pain. She denies any history of myocardial infarction, congestive heart failure or arrhythmia. She denies any history of stress test or cardiac catheterization. She denies history of diabetes or tobacco abuse. He was noted to be in new onset atrial fibrillation with rapid ventricle response and placed on IV Cardizem drip and has since spontaneously converted to normal sinus rhythm. CT of the chest shows no evidence of pulmonary believes him, mild cardiomegaly, coronary calcifications as well is reflux of contrast into the hepatic vein suggesting a degree of heart failure. There is small to moderate bilateral pleural effusions. Also noted is a left lower lobe infiltrate/pneumonia. Her second troponin is mildly elevated at 0.03. Blood pressure significant elevated on presentation at 159/102.    20: converted to NSR. S/p BEBE/LHC and RHC with normal coronaries, mod PHTN, mod MR and severe TR. denies any chest pain or shortness of breath at this time. He is on Lovenox subcu full dose and IV Lasix. 20: Echo with EF of 35-40%, severe TR and mod to severe MR, RVSP of 60mmHg. Doing ok today. Remains in afib with RVR on tele. 20  Prashantemile Rosa M (:  1935) has requested an audio/video evaluation for the following concern(s):    Status post hospitalization for acute decompensated heart failure and new onset atrial fibrillation. She states she is improving every day. She is feeling better.   Was placed on oral anticoagulation, Lopressor, Lipitor, Lasix

## 2023-05-25 ENCOUNTER — TELEPHONE (OUTPATIENT)
Dept: CARDIOLOGY CLINIC | Age: 88
End: 2023-05-25

## 2023-05-25 NOTE — TELEPHONE ENCOUNTER
Pt daughter, Carolyn Williamson calling to let us know that [facility] will be calling us regarding Cardizem. They need more information on script.     [facility]- Doctors line # [phone]    Stephanie # [phone]

## 2023-06-05 NOTE — PROGRESS NOTES
Physician Progress Note    2020   10:44 AM    Name:  Lilliam Looney  MRN:    [id number]      Day: 1     Admit Date: 2020  9:48 AM  PCP: Andre Mishra MD    Code Status:  Full Code      Assessment and Plan: Active Problems/ diagnosis:     New onset afib with RVR- now controlled   Left sided PNA   Lung nodule   HTN    Plan:  - rate is ocntrolled on current meds. Seen by Dr Castro Platt last evening. Echo being done  - resume abx  - pulm consult for lung nodules   - resume home meds     DVT PPx     Subjective:      no new events. Dyspnea is improving.      Physical Examination:      Vitals:  /68   Pulse 102   Temp 97.7 °F (36.5 °C) (Oral)   Resp 16   Ht 5' 1\" (1.549 m)   Wt 180 lb (81.6 kg)   SpO2 93%   BMI 34.01 kg/m²   Temp (24hrs), Av.6 °F (36.4 °C), Min:97.3 °F (36.3 °C), Max:97.8 °F (36.6 °C)      General appearance: alert, cooperative and no distress  Mental Status: oriented to person, place and time and normal affect  Lungs: clear to auscultation bilaterally, normal effort  Heart: regular rate and rhythm, no murmur  Abdomen: soft, nontender, nondistended, bowel sounds present, no masses  Extremities: no edema, redness, tenderness in the calves  Skin: no gross lesions, rashes    Data:     Labs:  Recent Labs     20  1000   WBC 10.2   HGB 14.6        Recent Labs     20  1000 20  0600    141   K 4.1 4.1    106   CO2 21 23   BUN 21 21   CREATININE 1.02* 1.12*   GLUCOSE 170* 138*     Recent Labs     20  1000   AST 23   ALT 41*   BILITOT 0.3   ALKPHOS 50       Current Facility-Administered Medications   Medication Dose Route Frequency Provider Last Rate Last Dose    melatonin tablet 5 mg  5 mg Oral Nightly PRN Benjamin Maurer MD        enoxaparin (LOVENOX) injection 80 mg  1 mg/kg Subcutaneous BID Benjamin Maurer MD   80 mg at 20 0834    sodium chloride flush 0.9 % injection 10 mL  10 mL Intravenous 2 times per day Kristie Martinez, APRN - CNP 10 mL at 06/25/20 0834    sodium chloride flush 0.9 % injection 10 mL  10 mL Intravenous PRN Roblero Numbers, APRN - CNP        acetaminophen (TYLENOL) tablet 650 mg  650 mg Oral Q6H PRN Roblero Numbers, APRN - CNP        Or    acetaminophen (TYLENOL) suppository 650 mg  650 mg Rectal Q6H PRN Roblero Numbers, APRN - CNP        polyethylene glycol (GLYCOLAX) packet 17 g  17 g Oral Daily PRN Roblero Numbers, APRN - CNP        promethazine (PHENERGAN) tablet 12.5 mg  12.5 mg Oral Q6H PRN Roblero Numbers, APRN - CNP        Or    ondansetron (ZOFRAN) injection 4 mg  4 mg Intravenous Q6H PRN Roblero Numbers, APRN - CNP        ipratropium-albuterol (DUONEB) nebulizer solution 1 ampule  1 ampule Inhalation Q4H PRN Roblero Numbers, APRN - CNP        azithromycin (ZITHROMAX) 500 mg in D5W 250ml addavial  500 mg Intravenous Q24H Roblero Numbers, APRN - CNP        And    cefTRIAXone (ROCEPHIN) 1 g IVPB in 50 mL D5W minibag  1 g Intravenous Q24H Roblero Numbers, APRN - CNP        guaiFENesin [facility]) extended release tablet 600 mg  600 mg Oral BID Roblero Numbers, APRN - CNP   600 mg at 06/25/20 0978    metoprolol tartrate (LOPRESSOR) tablet 25 mg  25 mg Oral Daily Wes J Holiday, DO   25 mg at 06/25/20 0834    furosemide (LASIX) injection 20 mg  20 mg Intravenous BID Wes J Holiday, DO   20 mg at 06/25/20 9762    aspirin chewable tablet 81 mg  81 mg Oral Daily Wes J Holiday, DO   81 mg at 06/25/20 2188    atorvastatin (LIPITOR) tablet 20 mg  20 mg Oral Nightly Wes J Holiday, DO   20 mg at 06/24/20 2229       Additional work up or/and treatment plan may be added today or then after based on clinical progression. I am managing a portion of pt care. Some medical issues are handled by other specialists. Additional work up and treatment should be done in out pt setting by pt PCP and other out pt providers.       In addition to examining and evaluating pt, I spent additional time explaining care, normaland abnormal findings, and treatment SSKI Counseling:  I discussed with the patient the risks of SSKI including but not limited to thyroid abnormalities, metallic taste, GI upset, fever, headache, acne, arthralgias, paraesthesias, lymphadenopathy, easy bleeding, arrhythmias, and allergic reaction.

## 2023-06-17 DIAGNOSIS — I48.0 PAROXYSMAL ATRIAL FIBRILLATION (HCC): ICD-10-CM

## 2023-06-19 NOTE — TELEPHONE ENCOUNTER
Requesting medication refill. Please approve or deny this request.    Rx requested:  Requested Prescriptions     Pending Prescriptions Disp Refills    metoprolol tartrate (LOPRESSOR) 25 MG tablet [Pharmacy Med Name: Metoprolol Tartrate 25 MG Oral Tablet] 30 tablet 11     Sig: TAKE ONE-HALF TABLET BY MOUTH  TWICE DAILY         Last Office Visit:   5/22/2023      Next Visit Date:  Future Appointments   Date Time Provider Yuridia López   9/21/2023 12:30 PM David Nunes DO [address] [place] [address]   11/16/2023 11:00 AM NANY Carlos [facility] [place]               Please approve or deny.

## 2023-07-12 ENCOUNTER — TELEPHONE (OUTPATIENT)
Dept: CARDIOLOGY CLINIC | Age: 88
End: 2023-07-12

## 2023-07-12 DIAGNOSIS — I50.22 CHRONIC SYSTOLIC CHF (CONGESTIVE HEART FAILURE), NYHA CLASS 1 (HCC): ICD-10-CM

## 2023-07-12 DIAGNOSIS — I48.0 PAROXYSMAL ATRIAL FIBRILLATION (HCC): ICD-10-CM

## 2023-07-12 DIAGNOSIS — I42.8 CARDIOMYOPATHY, NONISCHEMIC (HCC): ICD-10-CM

## 2023-07-14 RX ORDER — SACUBITRIL AND VALSARTAN 24; 26 MG/1; MG/1
1 TABLET, FILM COATED ORAL 2 TIMES DAILY
Qty: 28 TABLET | Refills: 0 | Status: CANCELLED | COMMUNITY
Start: 2023-07-14

## 2023-07-14 NOTE — TELEPHONE ENCOUNTER
Daughter, Alina Alexandre, aware of samples available.      Xarelto 15mg; 5 bottles (7 ct)   Lot: [id number]  EXP: 08/2024    Entresto 24/26mg; 1 bottle (28 ct)  Lot: [id number]  EXP: 02/2025

## 2023-08-14 ENCOUNTER — HOSPITAL ENCOUNTER (OUTPATIENT)
Dept: CARDIOLOGY | Age: 88
Discharge: HOME | End: 2023-08-14
Payer: MEDICARE

## 2023-08-14 PROCEDURE — 93280 PM DEVICE PROGR EVAL DUAL: CPT

## 2023-09-05 ENCOUNTER — TELEPHONE (OUTPATIENT)
Dept: CARDIOLOGY CLINIC | Age: 88
End: 2023-09-05

## 2023-09-05 DIAGNOSIS — I48.0 PAROXYSMAL ATRIAL FIBRILLATION (HCC): ICD-10-CM

## 2023-09-05 DIAGNOSIS — I50.22 CHRONIC SYSTOLIC CHF (CONGESTIVE HEART FAILURE), NYHA CLASS 1 (HCC): ICD-10-CM

## 2023-09-05 DIAGNOSIS — I42.8 CARDIOMYOPATHY, NONISCHEMIC (HCC): ICD-10-CM

## 2023-09-05 RX ORDER — SACUBITRIL AND VALSARTAN 24; 26 MG/1; MG/1
1 TABLET, FILM COATED ORAL 2 TIMES DAILY
Qty: 56 TABLET | Refills: 0 | Status: SHIPPED | COMMUNITY
Start: 2023-09-05

## 2023-09-21 ENCOUNTER — OFFICE VISIT (OUTPATIENT)
Dept: CARDIOLOGY CLINIC | Age: 88
End: 2023-09-21

## 2023-09-21 VITALS
BODY MASS INDEX: 31.87 KG/M2 | DIASTOLIC BLOOD PRESSURE: 70 MMHG | SYSTOLIC BLOOD PRESSURE: 120 MMHG | WEIGHT: 168.8 LBS | OXYGEN SATURATION: 95 % | HEIGHT: 61 IN | HEART RATE: 97 BPM

## 2023-09-21 DIAGNOSIS — E78.2 MIXED HYPERLIPIDEMIA: ICD-10-CM

## 2023-09-21 DIAGNOSIS — I50.32 CHRONIC DIASTOLIC CHF (CONGESTIVE HEART FAILURE), NYHA CLASS 1 (HCC): ICD-10-CM

## 2023-09-21 DIAGNOSIS — I27.20 PULMONARY HYPERTENSION (HCC): ICD-10-CM

## 2023-09-21 DIAGNOSIS — I49.5 SSS (SICK SINUS SYNDROME) (HCC): ICD-10-CM

## 2023-09-21 DIAGNOSIS — R00.1 BRADYCARDIA: ICD-10-CM

## 2023-09-21 DIAGNOSIS — I10 PRIMARY HYPERTENSION: ICD-10-CM

## 2023-09-21 DIAGNOSIS — I49.5 SICK SINUS SYNDROME (HCC): ICD-10-CM

## 2023-09-21 DIAGNOSIS — I48.0 PAROXYSMAL ATRIAL FIBRILLATION (HCC): Primary | ICD-10-CM

## 2023-09-21 ASSESSMENT — ENCOUNTER SYMPTOMS
ABDOMINAL PAIN: 0
EYES NEGATIVE: 1
NAUSEA: 0
WHEEZING: 0
VOMITING: 0
SHORTNESS OF BREATH: 1
ALLERGIC/IMMUNOLOGIC NEGATIVE: 1
GASTROINTESTINAL NEGATIVE: 1

## 2023-09-21 NOTE — PROGRESS NOTES
MD   multivitamin (ANTIOXIDANT;PROSIGHT) TABS per tablet Take 1 tablet by mouth daily Yes Historical Provider, MD       Social History     Tobacco Use    Smoking status: Never    Smokeless tobacco: Never   Vaping Use    Vaping Use: Never used   Substance Use Topics    Alcohol use: No    Drug use: Never        Allergies   Allergen Reactions    Remeron [Mirtazapine] Hallucinations   ,   Past Medical History:   Diagnosis Date    Atrial fibrillation ([address])     Cardiomyopathy, nonischemic ([address]) 10/13/2020    Depression     Elevated glucose     HTN (hypertension)     Hyperlipidemia     Osteopenia     Hips    Pulmonary hypertension ([address]) 10/13/2020    Rheumatic heart disease     as a child    Rib fractures     Secondary to AA   ,   Past Surgical History:   Procedure Laterality Date    APPENDECTOMY       SECTION      COLONOSCOPY      Internal hemorrhoids    COLONOSCOPY      normal    CT NEEDLE BIOPSY LUNG PERCUTANEOUS  2020    CT NEEDLE BIOPSY LUNG PERCUTANEOUS 2020 [facility] CT SCAN    EYE SURGERY      HYSTERECTOMY (CERVIX STATUS UNKNOWN)     ,   Family History   Problem Relation Age of Onset    Heart Disease Mother         Valvular heart disease from childhood rheumatic fever    Diabetes Maternal Aunt      Physical Exam  Constitutional:       General: She is not in acute distress. Appearance: Normal appearance. HENT:      Head: Normocephalic and atraumatic. Neck:      Musculoskeletal: Normal range of motion and neck supple. Vascular: No carotid bruit. Cardiovascular:      Rate and Rhythm: Normal rate and regular rhythm. Heart sounds: No murmur. Pulmonary:      Effort: Pulmonary effort is normal. No respiratory distress. Breath sounds: Normal breath sounds. No wheezing, rhonchi or rales. Abdominal:      Palpations: Abdomen is soft. Tenderness: There is no abdominal tenderness. Musculoskeletal: Normal range of motion. Right lower leg: No edema.       Left lower

## 2023-10-07 DIAGNOSIS — E78.2 MIXED HYPERLIPIDEMIA: ICD-10-CM

## 2023-10-07 RX ORDER — ATORVASTATIN CALCIUM 20 MG/1
TABLET, FILM COATED ORAL
Qty: 90 TABLET | Refills: 3 | Status: SHIPPED | OUTPATIENT
Start: 2023-10-07

## 2023-10-07 NOTE — TELEPHONE ENCOUNTER
Requesting medication refill. Rx requested:  Requested Prescriptions     Pending Prescriptions Disp Refills    atorvastatin (LIPITOR) 20 MG tablet [Pharmacy Med Name: Atorvastatin Calcium 20 MG Oral Tablet] 90 tablet 3     Sig: TAKE 1 TABLET BY MOUTH AT  NIGHT         Last Office Visit:   5/22/2023      Next Visit Date:  Future Appointments   Date Time Provider [address]   11/16/2023 11:00 AM Ksenia Godinez [facility] [place]   9/19/2024 12:45 PM HolGuillermo mas [facility]               Last refill 09/21/2022.

## 2023-11-01 ENCOUNTER — TELEPHONE (OUTPATIENT)
Dept: CARDIOLOGY CLINIC | Age: 88
End: 2023-11-01

## 2023-11-01 DIAGNOSIS — I50.22 CHRONIC SYSTOLIC CHF (CONGESTIVE HEART FAILURE), NYHA CLASS 1 (HCC): ICD-10-CM

## 2023-11-01 DIAGNOSIS — I48.0 PAROXYSMAL ATRIAL FIBRILLATION (HCC): ICD-10-CM

## 2023-11-01 DIAGNOSIS — I42.8 CARDIOMYOPATHY, NONISCHEMIC (HCC): ICD-10-CM

## 2023-11-01 RX ORDER — SACUBITRIL AND VALSARTAN 24; 26 MG/1; MG/1
1 TABLET, FILM COATED ORAL 2 TIMES DAILY
Qty: 28 TABLET | Refills: 0 | Status: SHIPPED | COMMUNITY
Start: 2023-11-01

## 2023-11-01 NOTE — TELEPHONE ENCOUNTER
Pt daughter, Massiel Lange requesting samples for pt. Requesting Xarelto 15 MG and Entresto 24-26 MG. Please call Massiel Lange once samples are ready to be picked up.    # [phone]

## 2023-11-16 ENCOUNTER — OFFICE VISIT (OUTPATIENT)
Dept: CARDIOLOGY CLINIC | Age: 88
End: 2023-11-16
Payer: MEDICARE

## 2023-11-16 VITALS
BODY MASS INDEX: 31.93 KG/M2 | OXYGEN SATURATION: 96 % | SYSTOLIC BLOOD PRESSURE: 138 MMHG | WEIGHT: 169 LBS | DIASTOLIC BLOOD PRESSURE: 58 MMHG | HEART RATE: 52 BPM | RESPIRATION RATE: 16 BRPM

## 2023-11-16 DIAGNOSIS — I50.32 CHRONIC DIASTOLIC CHF (CONGESTIVE HEART FAILURE), NYHA CLASS 1 (HCC): ICD-10-CM

## 2023-11-16 DIAGNOSIS — Z95.0 HISTORY OF PACEMAKER: ICD-10-CM

## 2023-11-16 DIAGNOSIS — Z86.79 HISTORY OF CARDIOMYOPATHY: ICD-10-CM

## 2023-11-16 DIAGNOSIS — I27.20 PULMONARY HYPERTENSION (HCC): ICD-10-CM

## 2023-11-16 DIAGNOSIS — I38 VALVULAR HEART DISEASE: ICD-10-CM

## 2023-11-16 DIAGNOSIS — N18.30 STAGE 3 CHRONIC KIDNEY DISEASE, UNSPECIFIED WHETHER STAGE 3A OR 3B CKD (HCC): ICD-10-CM

## 2023-11-16 DIAGNOSIS — I48.0 PAROXYSMAL ATRIAL FIBRILLATION (HCC): ICD-10-CM

## 2023-11-16 PROCEDURE — G8427 DOCREV CUR MEDS BY ELIG CLIN: HCPCS | Performed by: PHYSICIAN ASSISTANT

## 2023-11-16 PROCEDURE — G8417 CALC BMI ABV UP PARAM F/U: HCPCS | Performed by: PHYSICIAN ASSISTANT

## 2023-11-16 PROCEDURE — 1090F PRES/ABSN URINE INCON ASSESS: CPT | Performed by: PHYSICIAN ASSISTANT

## 2023-11-16 PROCEDURE — G8484 FLU IMMUNIZE NO ADMIN: HCPCS | Performed by: PHYSICIAN ASSISTANT

## 2023-11-16 PROCEDURE — 1123F ACP DISCUSS/DSCN MKR DOCD: CPT | Performed by: PHYSICIAN ASSISTANT

## 2023-11-16 PROCEDURE — 99214 OFFICE O/P EST MOD 30 MIN: CPT | Performed by: PHYSICIAN ASSISTANT

## 2023-11-16 PROCEDURE — 1036F TOBACCO NON-USER: CPT | Performed by: PHYSICIAN ASSISTANT

## 2023-11-16 ASSESSMENT — ENCOUNTER SYMPTOMS
SHORTNESS OF BREATH: 0
COUGH: 0
NAUSEA: 0
CHEST TIGHTNESS: 0
COLOR CHANGE: 0
BLOOD IN STOOL: 0
ABDOMINAL PAIN: 0
VOMITING: 0

## 2023-11-16 NOTE — PROGRESS NOTES
of pacemaker      Dual chamber MRI compatible PPM implant on 5/10/23 for SSS      5. Valvular heart disease  Echo (TTE) complete (PRN contrast/bubble/strain/3D)    1-2+ MR and mild TR per echo 10/21/21      6. Pulmonary hypertension (HCC)      Mild with RVSP 40mmHg per echo 10/21/21.      7. Stage 3 chronic kidney disease, unspecified whether stage 3a or 3b CKD (HCC)      Stable renal function per BMP 5/11/23          -Maximize medical therapy--aspirin 81 mg p.o. daily, Lopressor 25 mg p.o. twice daily, Cardizem 120 mg p.o. daily, Entresto 24/26 mg p.o. twice daily, Lasix 20 mg p.o. daily, Lipitor 20 mg tablet p.o. daily at bedtime, OAC with Xarelto 15 mg p.o. daily (dose was reduced back in 5/2021 as CrCl<51)  **Consider further titration of Lopressor +/- Cardizem in future if recurrent A-fib RVR noted on subsequent pacemaker checks  -Heart Failure appears compensated at this time. No need for further medication adjustments  -Recent labs from May 2023 reviewed. Renal function electrolytes stable compared to prior.  -Cardiac/<2 gram sodium diet recommended  -Recommend 2000mL daily fluid restriction   -Instructed patient to weigh self daily every morning upon waking and keep log book of daily weights. Notify office if gaining more than 3 pounds in 48 hours. -Advised patient to notify office immediately if experiencing any progressive SOB, orthopnea, PND, LE edema or weight gain  -Educated patient on importance of fluid and salt restriction as well as lifestyle modification  -Maintain outpatient follow-up with pulmonology, Dr. Florence Davila routine outpatient follow-up with general cardiologist, Dr. Badillo December appt 5/19/23  **Echocardiogram ordered to reevaluate LV function, rule out progression of valvular heart disease and reevaluate pulmonary pressures.  ?  If progression of valvular heart disease could possibly be contributing to patient's persistent fatigue and tiredness complaints  Echocardiogram 10/21/2021:

## 2023-11-16 NOTE — PATIENT INSTRUCTIONS
**Check home Pacemaker monitor and make sure device is plugged in    **Echocardiogram ordered to re-evaluate LVF, valvular structures and pulmonary pressures  Call [phone] to schedule      -Check daily weight every morning and notify CHF clinic if gaining more than 3 pounds in 2 days    -Check blood pressure twice daily in a.m. and p.m. prior to taking medications and keep log of blood pressure trends to review at next office visit  Notify office if BP running low with  mmHg or below prior to taking medications  Notify office if BP running high with  mmHg or above or if DBP 90 mmHg or above    -Recommend 2000 mg daily sodium restriction    -Recommend  2 liter (64 ounces) daily fluid restriction

## 2023-12-04 DIAGNOSIS — I42.8 CARDIOMYOPATHY, NONISCHEMIC (HCC): ICD-10-CM

## 2023-12-04 DIAGNOSIS — I50.22 CHRONIC SYSTOLIC CHF (CONGESTIVE HEART FAILURE), NYHA CLASS 1 (HCC): ICD-10-CM

## 2023-12-04 DIAGNOSIS — I48.0 PAROXYSMAL ATRIAL FIBRILLATION (HCC): ICD-10-CM

## 2023-12-04 RX ORDER — SACUBITRIL AND VALSARTAN 24; 26 MG/1; MG/1
1 TABLET, FILM COATED ORAL 2 TIMES DAILY
Qty: 28 TABLET | Refills: 0 | Status: SHIPPED | COMMUNITY
Start: 2023-12-04

## 2023-12-11 PROCEDURE — 93294 REM INTERROG EVL PM/LDLS PM: CPT | Performed by: INTERNAL MEDICINE

## 2023-12-11 PROCEDURE — 93296 REM INTERROG EVL PM/IDS: CPT | Performed by: INTERNAL MEDICINE

## 2024-01-02 ENCOUNTER — TELEPHONE (OUTPATIENT)
Dept: CARDIOLOGY CLINIC | Age: 89
End: 2024-01-02

## 2024-01-02 DIAGNOSIS — I48.0 PAROXYSMAL ATRIAL FIBRILLATION (HCC): ICD-10-CM

## 2024-01-02 DIAGNOSIS — I50.22 CHRONIC SYSTOLIC CHF (CONGESTIVE HEART FAILURE), NYHA CLASS 1 (HCC): ICD-10-CM

## 2024-01-02 DIAGNOSIS — I42.8 CARDIOMYOPATHY, NONISCHEMIC (HCC): ICD-10-CM

## 2024-01-02 RX ORDER — SACUBITRIL AND VALSARTAN 24; 26 MG/1; MG/1
1 TABLET, FILM COATED ORAL 2 TIMES DAILY
Qty: 28 TABLET | Refills: 0 | Status: SHIPPED | COMMUNITY
Start: 2024-01-02

## 2024-01-02 NOTE — TELEPHONE ENCOUNTER
Pt called for samples of the Xarelto 15mg and Entresto 24/26mg    I packaged up   5 bottles of Xarelto   LOT [id number]  EXP 02/25    1 bottle of Xarelto  LOT [id number]  EXP 08/24    1 bottle of Xarelto   LOT [id number]  EXP 08/24    1 bottle of Entresto 24/26mg  LOT [id number]  EXP 4/26

## 2024-01-15 RX ORDER — DILTIAZEM HYDROCHLORIDE 60 MG/1
60 TABLET, FILM COATED ORAL DAILY
Qty: 90 TABLET | Refills: 3 | Status: SHIPPED | OUTPATIENT
Start: 2024-01-15

## 2024-01-15 NOTE — TELEPHONE ENCOUNTER
Comments: incoming fax from pharm    Last Office Visit (last PCP visit):   9/21/2023    Next Visit Date:  Future Appointments   Date Time Provider Department Center   9/19/2024 12:45 PM David Wall DO [facility]   11/6/2024 10:00 AM Flavia Goff PA [facility]       **If hasn't been seen in over a year OR hasn't followed up according to last diabetes/ADHD visit, make appointment for patient before sending refill to provider.    Rx requested:  Requested Prescriptions     Pending Prescriptions Disp Refills    dilTIAZem (CARDIZEM) 60 MG tablet 120 tablet 3     Sig: Take 1 tablet by mouth daily

## 2024-01-29 DIAGNOSIS — I50.22 CHRONIC SYSTOLIC CHF (CONGESTIVE HEART FAILURE), NYHA CLASS 1 (HCC): ICD-10-CM

## 2024-01-29 DIAGNOSIS — I48.0 PAROXYSMAL ATRIAL FIBRILLATION (HCC): ICD-10-CM

## 2024-01-29 DIAGNOSIS — I42.8 CARDIOMYOPATHY, NONISCHEMIC (HCC): ICD-10-CM

## 2024-01-31 RX ORDER — SACUBITRIL AND VALSARTAN 24; 26 MG/1; MG/1
1 TABLET, FILM COATED ORAL 2 TIMES DAILY
Qty: 42 TABLET | Refills: 0 | COMMUNITY
Start: 2024-01-31

## 2024-02-18 RX ORDER — FUROSEMIDE 20 MG/1
20 TABLET ORAL DAILY
Qty: 90 TABLET | Refills: 3 | Status: SHIPPED | OUTPATIENT
Start: 2024-02-18

## 2024-03-04 DIAGNOSIS — I50.22 CHRONIC SYSTOLIC CHF (CONGESTIVE HEART FAILURE), NYHA CLASS 1 (HCC): ICD-10-CM

## 2024-03-04 DIAGNOSIS — I42.8 CARDIOMYOPATHY, NONISCHEMIC (HCC): ICD-10-CM

## 2024-03-04 RX ORDER — SACUBITRIL AND VALSARTAN 24; 26 MG/1; MG/1
1 TABLET, FILM COATED ORAL 2 TIMES DAILY
Qty: 84 TABLET | Refills: 0 | Status: SHIPPED | COMMUNITY
Start: 2024-03-04

## 2024-03-11 PROCEDURE — 93296 REM INTERROG EVL PM/IDS: CPT | Performed by: INTERNAL MEDICINE

## 2024-03-11 PROCEDURE — 93294 REM INTERROG EVL PM/LDLS PM: CPT | Performed by: INTERNAL MEDICINE

## 2024-03-25 ENCOUNTER — TELEPHONE (OUTPATIENT)
Dept: CARDIOLOGY CLINIC | Age: 89
End: 2024-03-25

## 2024-04-23 DIAGNOSIS — I42.8 CARDIOMYOPATHY, NONISCHEMIC (HCC): ICD-10-CM

## 2024-04-23 DIAGNOSIS — I50.22 CHRONIC SYSTOLIC CHF (CONGESTIVE HEART FAILURE), NYHA CLASS 1 (HCC): ICD-10-CM

## 2024-04-23 RX ORDER — SACUBITRIL AND VALSARTAN 24; 26 MG/1; MG/1
1 TABLET, FILM COATED ORAL 2 TIMES DAILY
Qty: 84 TABLET | Refills: 0 | COMMUNITY
Start: 2024-04-23 | End: 2024-04-26 | Stop reason: SDUPTHER

## 2024-04-23 NOTE — TELEPHONE ENCOUNTER
Requesting medication refill. Please approve or deny this request.    Rx requested:  Requested Prescriptions     Pending Prescriptions Disp Refills    sacubitril-valsartan (ENTRESTO) 24-26 MG per tablet 84 tablet 0     Sig: Take 1 tablet by mouth 2 times daily         Last Office Visit:   9/21/2023      Next Visit Date:  Future Appointments   Date Time Provider Department Center   9/19/2024 12:45 PM Holiday, DO [facility]   11/6/2024 10:00 AM Flavia Goff PA [facility]

## 2024-04-26 DIAGNOSIS — I42.8 CARDIOMYOPATHY, NONISCHEMIC (HCC): ICD-10-CM

## 2024-04-26 DIAGNOSIS — I50.22 CHRONIC SYSTOLIC CHF (CONGESTIVE HEART FAILURE), NYHA CLASS 1 (HCC): ICD-10-CM

## 2024-04-26 RX ORDER — SACUBITRIL AND VALSARTAN 24; 26 MG/1; MG/1
1 TABLET, FILM COATED ORAL 2 TIMES DAILY
Qty: 180 TABLET | Refills: 3 | Status: SHIPPED | OUTPATIENT
Start: 2024-04-26

## 2024-04-30 DIAGNOSIS — I48.0 PAROXYSMAL ATRIAL FIBRILLATION (HCC): ICD-10-CM

## 2024-04-30 DIAGNOSIS — I50.22 CHRONIC SYSTOLIC CHF (CONGESTIVE HEART FAILURE), NYHA CLASS 1 (HCC): ICD-10-CM

## 2024-04-30 DIAGNOSIS — I42.8 CARDIOMYOPATHY, NONISCHEMIC (HCC): ICD-10-CM

## 2024-04-30 RX ORDER — SACUBITRIL AND VALSARTAN 24; 26 MG/1; MG/1
1 TABLET, FILM COATED ORAL 2 TIMES DAILY
Qty: 56 TABLET | Refills: 0 | COMMUNITY
Start: 2024-04-30 | End: 2024-05-03 | Stop reason: SDUPTHER

## 2024-04-30 NOTE — TELEPHONE ENCOUNTER
Pt called in for samples of entresto  Requesting medication refill. Please approve or deny this request.    Rx requested:  Requested Prescriptions     Pending Prescriptions Disp Refills    sacubitril-valsartan (ENTRESTO) 24-26 MG per tablet 56 tablet 0     Sig: Take 1 tablet by mouth 2 times daily         Last Office Visit:   9/21/2023      Next Visit Date:  Future Appointments   Date Time Provider Department Center   9/19/2024 12:45 PM David Wall DO [facility]   11/6/2024 10:00 AM Flavia Goff [facility]

## 2024-05-03 DIAGNOSIS — I42.8 CARDIOMYOPATHY, NONISCHEMIC (HCC): ICD-10-CM

## 2024-05-03 DIAGNOSIS — I50.22 CHRONIC SYSTOLIC CHF (CONGESTIVE HEART FAILURE), NYHA CLASS 1 (HCC): ICD-10-CM

## 2024-05-03 DIAGNOSIS — I48.0 PAROXYSMAL ATRIAL FIBRILLATION (HCC): ICD-10-CM

## 2024-05-03 RX ORDER — SACUBITRIL AND VALSARTAN 24; 26 MG/1; MG/1
1 TABLET, FILM COATED ORAL 2 TIMES DAILY
Qty: 180 TABLET | Refills: 3 | Status: SHIPPED | OUTPATIENT
Start: 2024-05-03

## 2024-05-03 NOTE — TELEPHONE ENCOUNTER
Requesting medication refill. Please approve or deny this request.    Rx requested:  Requested Prescriptions     Pending Prescriptions Disp Refills    rivaroxaban (XARELTO) 15 MG TABS tablet 90 tablet 2     Sig: Take 1 tablet by mouth daily (with breakfast)    sacubitril-valsartan (ENTRESTO) 24-26 MG per tablet 180 tablet 2     Sig: Take 1 tablet by mouth 2 times daily         Last Office Visit:   9/21/2023      Next Visit Date:  Future Appointments   Date Time Provider Department Center   9/19/2024 12:45 PM Mae, DO [facility]   11/6/2024 10:00 AM Flavia Goff PA [facility]

## 2024-05-24 ENCOUNTER — TELEPHONE (OUTPATIENT)
Dept: CARDIOLOGY CLINIC | Age: 89
End: 2024-05-24

## 2024-06-17 RX ORDER — DILTIAZEM HYDROCHLORIDE 60 MG/1
60 TABLET, FILM COATED ORAL DAILY
Qty: 90 TABLET | Refills: 3 | Status: SHIPPED | OUTPATIENT
Start: 2024-06-17 | End: 2024-06-21 | Stop reason: DRUGHIGH

## 2024-06-17 NOTE — TELEPHONE ENCOUNTER
Requesting medication refill. Please approve or deny this request.    Rx requested:  Requested Prescriptions     Pending Prescriptions Disp Refills    dilTIAZem (CARDIZEM) 60 MG tablet 90 tablet 3     Sig: Take 1 tablet by mouth daily         Last Office Visit:   9/21/2023      Next Visit Date:  Future Appointments   Date Time Provider Department Center   9/19/2024 12:45 PM Holiday, DO [facility]   11/6/2024 10:00 AM Flavia Goff PA [facility]

## 2024-06-18 ENCOUNTER — TELEPHONE (OUTPATIENT)
Dept: CARDIOLOGY CLINIC | Age: 89
End: 2024-06-18

## 2024-06-18 NOTE — TELEPHONE ENCOUNTER
Pt daughter, Stephanie requesting samples of Entresto 24-26 MG.      PT GIVEN 2 BOTTLES OF ENTRESTO 24-26 MG.    LOT:[id number]   EXP: FEB 2025

## 2024-06-21 ENCOUNTER — TELEPHONE (OUTPATIENT)
Dept: CARDIOLOGY CLINIC | Age: 89
End: 2024-06-21

## 2024-06-21 NOTE — TELEPHONE ENCOUNTER
Received call from patient's daughter Stephanie. Patient has not received refills for Cardizem. Called [facility]. They needed clarification on frequency and dose.     Confirmed dose with patient's daughter. Patient taking 60mg tablet 1/2 tablet, BID. Notified pharmacist at [facility]. Prescription edited to be Cardizem 30mg tablet BID.     Notified patient's daughter Stephanie that prescription will be delivered to patient on Saturday and that they are sending 30mg tablets, so Cristin will not have to split the tablets in half. Stephanie verbalized understanding. Medication list updated in chart.

## 2024-08-20 ENCOUNTER — HOSPITAL ENCOUNTER (OUTPATIENT)
Dept: CARDIOLOGY | Age: 89
Discharge: HOME | End: 2024-08-20
Payer: MEDICARE

## 2024-08-20 DIAGNOSIS — Z95.0 PACEMAKER: Primary | ICD-10-CM

## 2024-08-20 PROCEDURE — 93296 REM INTERROG EVL PM/IDS: CPT

## 2024-09-03 DIAGNOSIS — I48.0 PAROXYSMAL ATRIAL FIBRILLATION (HCC): ICD-10-CM

## 2024-09-03 RX ORDER — METOPROLOL TARTRATE 25 MG/1
25 TABLET, FILM COATED ORAL 2 TIMES DAILY
Qty: 180 TABLET | Refills: 3 | Status: SHIPPED | OUTPATIENT
Start: 2024-09-03

## 2024-09-03 RX ORDER — METOPROLOL TARTRATE 25 MG/1
25 TABLET, FILM COATED ORAL 2 TIMES DAILY
Qty: 60 TABLET | Refills: 3 | Status: CANCELLED | OUTPATIENT
Start: 2024-09-03

## 2024-09-03 NOTE — TELEPHONE ENCOUNTER
Requesting medication refill. Please approve or deny this request.    Rx requested:  Requested Prescriptions      No prescriptions requested or ordered in this encounter         Last Office Visit:   9/21/2023      Next Visit Date:  Future Appointments   Date Time Provider Department Center   9/19/2024 12:45 PM [facility], DO [facility]   11/6/2024 10:00 AM Flavia Goff PA [facility]   11/27/2024 10:30 AM [facility]   3/5/2025  9:15 AM [facility]   6/11/2025  9:15 AM [facility]

## 2024-09-16 RX ORDER — DILTIAZEM HYDROCHLORIDE 30 MG/1
30 TABLET, FILM COATED ORAL 2 TIMES DAILY
Qty: 180 TABLET | Refills: 3 | Status: SHIPPED | OUTPATIENT
Start: 2024-09-16

## 2024-09-19 ENCOUNTER — OFFICE VISIT (OUTPATIENT)
Dept: CARDIOLOGY CLINIC | Age: 89
End: 2024-09-19

## 2024-09-19 VITALS
HEART RATE: 89 BPM | DIASTOLIC BLOOD PRESSURE: 86 MMHG | WEIGHT: 167.8 LBS | BODY MASS INDEX: 31.71 KG/M2 | SYSTOLIC BLOOD PRESSURE: 128 MMHG

## 2024-09-19 DIAGNOSIS — I42.8 CARDIOMYOPATHY, NONISCHEMIC (HCC): ICD-10-CM

## 2024-09-19 DIAGNOSIS — Z95.0 HISTORY OF PACEMAKER: ICD-10-CM

## 2024-09-19 DIAGNOSIS — E78.2 MIXED HYPERLIPIDEMIA: ICD-10-CM

## 2024-09-19 DIAGNOSIS — I50.32 CHRONIC DIASTOLIC CHF (CONGESTIVE HEART FAILURE), NYHA CLASS 1 (HCC): ICD-10-CM

## 2024-09-19 DIAGNOSIS — I10 PRIMARY HYPERTENSION: ICD-10-CM

## 2024-09-19 DIAGNOSIS — I48.0 PAROXYSMAL ATRIAL FIBRILLATION (HCC): Primary | ICD-10-CM

## 2024-09-19 DIAGNOSIS — I49.5 SSS (SICK SINUS SYNDROME) (HCC): ICD-10-CM

## 2024-09-19 DIAGNOSIS — N18.30 STAGE 3 CHRONIC KIDNEY DISEASE, UNSPECIFIED WHETHER STAGE 3A OR 3B CKD (HCC): ICD-10-CM

## 2024-09-19 DIAGNOSIS — I50.22 CHRONIC SYSTOLIC CHF (CONGESTIVE HEART FAILURE), NYHA CLASS 1 (HCC): ICD-10-CM

## 2024-09-19 DIAGNOSIS — I27.20 PULMONARY HYPERTENSION (HCC): ICD-10-CM

## 2024-09-19 RX ORDER — MEMANTINE HYDROCHLORIDE 10 MG/1
10 TABLET ORAL 2 TIMES DAILY
COMMUNITY

## 2024-09-19 ASSESSMENT — ENCOUNTER SYMPTOMS
WHEEZING: 0
NAUSEA: 0
EYES NEGATIVE: 1
ABDOMINAL PAIN: 0
SHORTNESS OF BREATH: 1
VOMITING: 0
ALLERGIC/IMMUNOLOGIC NEGATIVE: 1
GASTROINTESTINAL NEGATIVE: 1

## 2024-09-20 RX ORDER — SACUBITRIL AND VALSARTAN 24; 26 MG/1; MG/1
1 TABLET, FILM COATED ORAL 2 TIMES DAILY
Qty: 112 TABLET | Refills: 0 | COMMUNITY
Start: 2024-09-20

## 2024-10-21 DIAGNOSIS — E78.2 MIXED HYPERLIPIDEMIA: ICD-10-CM

## 2024-10-21 RX ORDER — ATORVASTATIN CALCIUM 20 MG/1
20 TABLET, FILM COATED ORAL NIGHTLY
Qty: 90 TABLET | Refills: 3 | Status: SHIPPED | OUTPATIENT
Start: 2024-10-21

## 2024-10-21 NOTE — TELEPHONE ENCOUNTER
Requesting medication refill. Please approve or deny this request.    Rx requested:  Requested Prescriptions     Pending Prescriptions Disp Refills    atorvastatin (LIPITOR) 20 MG tablet 90 tablet 3     Sig: Take 1 tablet by mouth nightly         Last Office Visit:   9/19/2024      Next Visit Date:  Future Appointments   Date Time Provider Department Center   11/6/2024 10:00 AM Flavia Goff PA [facility]   11/27/2024 10:30 AM [facility]   3/5/2025  9:15 AM [facility]   6/11/2025  9:15 AM [facility]   9/19/2025 11:30 AM David Wall DO [facility]

## 2024-11-06 ENCOUNTER — TELEPHONE (OUTPATIENT)
Dept: CARDIOLOGY CLINIC | Age: 89
End: 2024-11-06

## 2024-11-06 ENCOUNTER — OFFICE VISIT (OUTPATIENT)
Dept: CARDIOLOGY CLINIC | Age: 89
End: 2024-11-06

## 2024-11-06 VITALS
BODY MASS INDEX: 32.84 KG/M2 | WEIGHT: 173.8 LBS | DIASTOLIC BLOOD PRESSURE: 82 MMHG | SYSTOLIC BLOOD PRESSURE: 128 MMHG | OXYGEN SATURATION: 96 % | HEART RATE: 94 BPM

## 2024-11-06 DIAGNOSIS — Z95.0 HISTORY OF PACEMAKER: ICD-10-CM

## 2024-11-06 DIAGNOSIS — I10 HYPERTENSION, UNSPECIFIED TYPE: ICD-10-CM

## 2024-11-06 DIAGNOSIS — I27.20 PULMONARY HYPERTENSION (HCC): ICD-10-CM

## 2024-11-06 DIAGNOSIS — N18.30 STAGE 3 CHRONIC KIDNEY DISEASE, UNSPECIFIED WHETHER STAGE 3A OR 3B CKD (HCC): ICD-10-CM

## 2024-11-06 DIAGNOSIS — I48.0 PAROXYSMAL ATRIAL FIBRILLATION (HCC): ICD-10-CM

## 2024-11-06 DIAGNOSIS — Z86.79 HISTORY OF CARDIOMYOPATHY: ICD-10-CM

## 2024-11-06 DIAGNOSIS — I50.32 CHRONIC HEART FAILURE WITH PRESERVED EJECTION FRACTION (HFPEF) (HCC): ICD-10-CM

## 2024-11-06 ASSESSMENT — ENCOUNTER SYMPTOMS
COUGH: 0
SHORTNESS OF BREATH: 0
VOMITING: 0
ABDOMINAL PAIN: 0
COLOR CHANGE: 0
BLOOD IN STOOL: 0
NAUSEA: 0
CHEST TIGHTNESS: 0

## 2024-11-06 NOTE — TELEPHONE ENCOUNTER
Pt given 3 bottles of Entresto 24-26 MG (28t)    LOT: [id number]  EXP: 11/30/2026    Pt given 8 boxes of Eliquis 5 MG (14)    LOT: [id number]  EXP: JAN 2026

## 2024-11-06 NOTE — PATIENT INSTRUCTIONS
Discontinue Xarelto 15mg PO daily--switching to Eliquis  START Eliquis 5mg PO twice daily--samples given and script sent to [email or web address] mail order    -Check daily weight every morning and notify CHF clinic if gaining more than 3 pounds in 2 days    -Check blood pressure twice daily in a.m. and p.m. prior to taking medications and keep log of blood pressure trends to review at next office visit  Notify office if BP running low with  mmHg or below prior to taking medications  Notify office if BP running high with  mmHg or above or if DBP 85 mmHg or above  -Check HR with each BP check  **Please call office next week with update on BP and HR. If HR running on high end consistently and BP is stable, we will likely increase meds for improved HR control    -Recommend 2000 mg daily sodium restriction    -Recommend 2 liter (64 ounces) daily fluid restriction    Pt added to recall list for an appointment in July

## 2024-11-06 NOTE — PROGRESS NOTES
05/25/2012 08:28 AM     Magnesium:    Lab Results   Component Value Date/Time    MG 2.1 06/24/2020 10:00 AM     TSH:  Lab Results   Component Value Date    TSH 2.490 05/19/2021     .result  No results for input(s): \"PROBNP\" in the last 72 hours.  No results for input(s): \"INR\" in the last 72 hours.            Patient Active Problem List   Diagnosis    HTN (hypertension)    Elevated glucose    Hyperlipidemia    Osteopenia    A-fib ([facility])    Lung nodule    Cardiomyopathy, nonischemic ([facility])    Pulmonary hypertension (HCC)    Bradycardia    Sick sinus syndrome (HCC)    Pacemaker    SSS (sick sinus syndrome) ([facility])       Assessment/Plan:     Diagnosis Orders   1. Chronic heart failure with preserved ejection fraction (HFpEF) ([facility])      Compensated. Continue current meds      2. History of cardiomyopathy      Hx recovered NICMP EF 50% per echo 10/21/21. Daughter wishing to defer repeat echo for now      3. Paroxysmal atrial fibrillation (HCC)      43.8% AT/AF burden and occasional tachy episodes on recent device check 8/20/24. ? Increase Lopressor or Cardizem in future      4. History of pacemaker      Dual chamber PPM implant on 5/10/23. Recent check 8/20/24: 43.8% AT/AF, multiple tachy episodes      5. Hypertension, unspecified type      BP stable. Continue current meds      6. Pulmonary hypertension ([facility])      RVSP 40mmHg on echo 10/21/21      7. Stage 3 chronic kidney disease, unspecified whether stage 3a or 3b CKD ([facility])      Renal function stable per labs 5/2024            -Maximize medical therapy--aspirin 81 mg p.o. daily, Lopressor 25 mg p.o. twice daily, Cardizem 30mg PO BID, Entresto 24/26 mg p.o. twice daily, Lasix 20 mg p.o.--pt taking every other day, Lipitor 20 mg tablet p.o. daily at bedtime, will discontinue Xarelto and changed to Eliquis 5 mg p.o. twice daily as patient relies heavily on samples of Xarelto and we no longer have samples in office  **Patient currently does not meet criteria for reduced dose

## 2024-11-25 ENCOUNTER — HOSPITAL ENCOUNTER (OUTPATIENT)
Dept: CARDIOLOGY | Age: 88
Discharge: HOME | End: 2024-11-25
Payer: MEDICARE

## 2024-11-25 PROCEDURE — 93296 REM INTERROG EVL PM/IDS: CPT

## 2025-02-23 RX ORDER — FUROSEMIDE 20 MG/1
20 TABLET ORAL EVERY OTHER DAY
Qty: 45 TABLET | Refills: 3 | Status: SHIPPED | OUTPATIENT
Start: 2025-02-23

## 2025-03-03 ENCOUNTER — HOSPITAL ENCOUNTER (OUTPATIENT)
Dept: CARDIOLOGY | Age: 89
Discharge: HOME | End: 2025-03-03
Payer: MEDICARE

## 2025-03-03 DIAGNOSIS — Z95.0 PACEMAKER: Primary | ICD-10-CM

## 2025-03-03 PROCEDURE — 93296 REM INTERROG EVL PM/IDS: CPT

## 2025-04-07 NOTE — TELEPHONE ENCOUNTER
Requesting medication refill VIA FAX.  Please approve or deny this request.    Rx requested:  Requested Prescriptions     Pending Prescriptions Disp Refills    sacubitril-valsartan (ENTRESTO) 24-26 MG per tablet 180 tablet 3     Sig: Take 1 tablet by mouth 2 times daily         Last Office Visit:   9/19/2024      Next Visit Date:  Future Appointments   Date Time Provider Department Center   9/19/2025 11:30 AM Holiday, DO [facility]

## 2025-06-09 ENCOUNTER — HOSPITAL ENCOUNTER (OUTPATIENT)
Dept: CARDIOLOGY | Age: 89
Discharge: HOME | End: 2025-06-09
Payer: MEDICARE

## 2025-06-09 PROCEDURE — 93296 REM INTERROG EVL PM/IDS: CPT

## 2025-06-09 NOTE — TELEPHONE ENCOUNTER
Requesting medication refill VIA FAX.  Please approve or deny this request.    Rx requested:  Requested Prescriptions     Pending Prescriptions Disp Refills    apixaban (ELIQUIS) 5 MG TABS tablet 180 tablet 3     Sig: Take 1 tablet by mouth 2 times daily         Last Office Visit:   9/19/2024      Next Visit Date:  Future Appointments   Date Time Provider Department Center   9/19/2025 11:30 AM Holiday, DO [facility]

## 2025-06-25 NOTE — TELEPHONE ENCOUNTER
[facility] Requesting medication refill VIA FAX.  Please approve or deny this request.    Rx requested:  Requested Prescriptions     Pending Prescriptions Disp Refills    apixaban (ELIQUIS) 5 MG TABS tablet 180 tablet 2     Sig: Take 1 tablet by mouth 2 times daily         Last Office Visit:   9/19/2024      Next Visit Date:  Future Appointments   Date Time Provider Department Center   9/19/2025 11:30 AM Holiday, DO [facility]

## 2025-09-02 RX ORDER — DILTIAZEM HYDROCHLORIDE 30 MG/1
30 TABLET, FILM COATED ORAL 2 TIMES DAILY
Qty: 180 TABLET | Refills: 3 | Status: SHIPPED | OUTPATIENT
Start: 2025-09-02